# Patient Record
Sex: MALE | Race: WHITE | NOT HISPANIC OR LATINO | Employment: UNEMPLOYED | URBAN - METROPOLITAN AREA
[De-identification: names, ages, dates, MRNs, and addresses within clinical notes are randomized per-mention and may not be internally consistent; named-entity substitution may affect disease eponyms.]

---

## 2017-01-12 ENCOUNTER — OFFICE VISIT (OUTPATIENT)
Dept: SURGERY | Facility: CLINIC | Age: 57
End: 2017-01-12
Payer: COMMERCIAL

## 2017-01-12 VITALS
HEIGHT: 68 IN | DIASTOLIC BLOOD PRESSURE: 87 MMHG | HEART RATE: 74 BPM | BODY MASS INDEX: 32.58 KG/M2 | SYSTOLIC BLOOD PRESSURE: 133 MMHG | WEIGHT: 215 LBS | TEMPERATURE: 99 F

## 2017-01-12 DIAGNOSIS — Z09 POSTOP CHECK: Primary | ICD-10-CM

## 2017-01-12 PROCEDURE — 99024 POSTOP FOLLOW-UP VISIT: CPT | Mod: S$GLB,,, | Performed by: SURGERY

## 2017-01-12 PROCEDURE — 99999 PR PBB SHADOW E&M-EST. PATIENT-LVL III: CPT | Mod: PBBFAC,,, | Performed by: SURGERY

## 2017-01-12 NOTE — LETTER
Jono ashley - General Surgery  [address]  [facility] [address]  Phone: [phone] January 12, 2017      Sina Fenton MD  [address]  [facility] [address]    Patient: Paz Dunne   MR Number: [id number]   YOB: 1960   Date of Visit: 1/12/2017     Dear Dr. Fenton:    Thank you for referring Paz Dunne to me for evaluation. Below are the relevant portions of my assessment and plan of care.    Patient is status post post lap HH. He denies dysphagia, but has to eat some foods slowly. He denies heartburn, but gets full quick. When he burps he gets foam in his mouth. He denies any pain.    PLAN: Patient is happy with his procedure.  Bloating likely will improve with no specific treatment (he could go on Reglan, but could get side effects from that).  Regular duty and follow up PRN.    If you have questions, please do not hesitate to call me. I look forward to following Paz along with you.    Sincerely,      Cooper Keene MD   Section Head - General, Laparoscopic, Bariatric  Acute Care and Oncologic Surgery   - Surgical Weight Loss Program  [facility]    WSR/hao

## 2017-01-12 NOTE — PROGRESS NOTES
"Paz Dunne is a 56 y.o. male patient.   1. Postop check      Past Medical History   Diagnosis Date    Dysphagia     Elevated PSA     Hiatal hernia     Hypertension      no meds    Inflammatory bowel disease      diverticulitis    Prostate cancer     Restless leg      No past surgical history pertinent negatives on file.  Scheduled Meds:   triptorelin pamoate  22.5 mg Intramuscular 1 time in Clinic/HOD     Continuous Infusions:   PRN Meds:    Review of patient's allergies indicates:   Allergen Reactions    Tetanus vaccines and toxoid      There are no hospital problems to display for this patient.    Blood pressure 133/87, pulse 74, temperature 98.5 °F (36.9 °C), height 5' 8" (1.727 m), weight 97.5 kg (215 lb).    Subjective S/p lap hh.  He denies dysphagia but has to eat some foods slowly.  He denies heartburn but gets full quick.  When he burps he gets foam in his mouth. He denies any pain.  Objective Abdomen benign, wounds clear.   Assessment & Plan He is happy with his procedure.  Bloating likely will improve with no specific treatment (he could go on reglan but could get side effects from that).  Regular duty and follow up prn.       Cooper Keene MD  1/12/2017  "

## 2017-02-03 ENCOUNTER — HISTORICAL (OUTPATIENT)
Dept: INFUSION THERAPY | Facility: HOSPITAL | Age: 57
End: 2017-02-03

## 2017-02-17 ENCOUNTER — HISTORICAL (OUTPATIENT)
Dept: HEMATOLOGY/ONCOLOGY | Facility: CLINIC | Age: 57
End: 2017-02-17

## 2017-02-24 ENCOUNTER — HISTORICAL (OUTPATIENT)
Dept: RADIOLOGY | Facility: HOSPITAL | Age: 57
End: 2017-02-24

## 2017-03-24 ENCOUNTER — HISTORICAL (OUTPATIENT)
Dept: RADIOLOGY | Facility: HOSPITAL | Age: 57
End: 2017-03-24

## 2017-05-04 ENCOUNTER — HISTORICAL (OUTPATIENT)
Dept: INFUSION THERAPY | Facility: HOSPITAL | Age: 57
End: 2017-05-04

## 2017-05-12 ENCOUNTER — HISTORICAL (OUTPATIENT)
Dept: HEMATOLOGY/ONCOLOGY | Facility: CLINIC | Age: 57
End: 2017-05-12

## 2017-05-12 LAB
ABS NEUT (OLG): 3 X10(3)/MCL (ref 2.1–9.2)
ALBUMIN SERPL-MCNC: 3.5 GM/DL (ref 3.4–5)
ALBUMIN/GLOB SERPL: 1.1 RATIO (ref 1.1–2)
ALP SERPL-CCNC: 136 UNIT/L (ref 50–136)
ALT SERPL-CCNC: 17 UNIT/L (ref 12–78)
AST SERPL-CCNC: 15 UNIT/L (ref 15–37)
BASOPHILS # BLD AUTO: 0.1 X10(3)/MCL (ref 0–0.2)
BASOPHILS NFR BLD AUTO: 1.3 %
BILIRUB SERPL-MCNC: 0.6 MG/DL (ref 0.2–1)
BILIRUBIN DIRECT+TOT PNL SERPL-MCNC: 0.1 MG/DL (ref 0–0.5)
BILIRUBIN DIRECT+TOT PNL SERPL-MCNC: 0.5 MG/DL (ref 0–0.8)
BUN SERPL-MCNC: 17 MG/DL (ref 7–18)
CALCIUM SERPL-MCNC: 8.9 MG/DL (ref 8.5–10.1)
CHLORIDE SERPL-SCNC: 106 MMOL/L (ref 98–107)
CO2 SERPL-SCNC: 31 MMOL/L (ref 21–32)
CREAT SERPL-MCNC: 0.88 MG/DL (ref 0.7–1.3)
EOSINOPHIL # BLD AUTO: 0.2 X10(3)/MCL (ref 0–0.9)
EOSINOPHIL NFR BLD AUTO: 4.3 %
ERYTHROCYTE [DISTWIDTH] IN BLOOD BY AUTOMATED COUNT: 12.6 % (ref 11.5–17)
GLOBULIN SER-MCNC: 3.1 GM/DL (ref 2.4–3.5)
GLUCOSE SERPL-MCNC: 90 MG/DL (ref 74–106)
HCT VFR BLD AUTO: 41.7 % (ref 42–52)
HGB BLD-MCNC: 13.5 GM/DL (ref 14–18)
LYMPHOCYTES # BLD AUTO: 0.9 X10(3)/MCL (ref 0.6–4.6)
LYMPHOCYTES NFR BLD AUTO: 19.3 %
MCH RBC QN AUTO: 28.4 PG (ref 27–31)
MCHC RBC AUTO-ENTMCNC: 32.4 GM/DL (ref 33–36)
MCV RBC AUTO: 87.6 FL (ref 80–94)
MONOCYTES # BLD AUTO: 0.3 X10(3)/MCL (ref 0.1–1.3)
MONOCYTES NFR BLD AUTO: 7.6 %
NEUTROPHILS # BLD AUTO: 3 X10(3)/MCL (ref 2.1–9.2)
NEUTROPHILS NFR BLD AUTO: 67.5 %
PLATELET # BLD AUTO: 231 X10(3)/MCL (ref 130–400)
PMV BLD AUTO: 9 FL (ref 9.4–12.4)
POTASSIUM SERPL-SCNC: 4.7 MMOL/L (ref 3.5–5.1)
PROT SERPL-MCNC: 6.6 GM/DL (ref 6.4–8.2)
PSA SERPL-MCNC: 1.37 NG/ML (ref 0–4)
RBC # BLD AUTO: 4.76 X10(6)/MCL (ref 4.7–6.1)
SODIUM SERPL-SCNC: 143 MMOL/L (ref 136–145)
WBC # SPEC AUTO: 4.4 X10(3)/MCL (ref 4.5–11.5)

## 2017-07-21 ENCOUNTER — HISTORICAL (OUTPATIENT)
Dept: HEMATOLOGY/ONCOLOGY | Facility: CLINIC | Age: 57
End: 2017-07-21

## 2017-07-21 LAB
ABS NEUT (OLG): 2.13 X10(3)/MCL (ref 2.1–9.2)
ALBUMIN SERPL-MCNC: 3.5 GM/DL (ref 3.4–5)
ALBUMIN/GLOB SERPL: 1.1 {RATIO}
ALP SERPL-CCNC: 126 UNIT/L (ref 50–136)
ALT SERPL-CCNC: 24 UNIT/L (ref 12–78)
AST SERPL-CCNC: 15 UNIT/L (ref 15–37)
BASOPHILS # BLD AUTO: 0.1 X10(3)/MCL (ref 0–0.2)
BASOPHILS NFR BLD AUTO: 1.7 %
BILIRUB SERPL-MCNC: 0.7 MG/DL (ref 0.2–1)
BILIRUBIN DIRECT+TOT PNL SERPL-MCNC: 0.1 MG/DL (ref 0–0.2)
BILIRUBIN DIRECT+TOT PNL SERPL-MCNC: 0.6 MG/DL (ref 0–0.8)
BUN SERPL-MCNC: 19 MG/DL (ref 7–18)
CALCIUM SERPL-MCNC: 8.6 MG/DL (ref 8.5–10.1)
CHLORIDE SERPL-SCNC: 104 MMOL/L (ref 98–107)
CO2 SERPL-SCNC: 29 MMOL/L (ref 21–32)
CREAT SERPL-MCNC: 0.92 MG/DL (ref 0.7–1.3)
EOSINOPHIL # BLD AUTO: 0.2 X10(3)/MCL (ref 0–0.9)
EOSINOPHIL NFR BLD AUTO: 4.4 %
ERYTHROCYTE [DISTWIDTH] IN BLOOD BY AUTOMATED COUNT: 12.6 % (ref 11.5–17)
GLOBULIN SER-MCNC: 3.2 GM/DL (ref 2.4–3.5)
GLUCOSE SERPL-MCNC: 86 MG/DL (ref 74–106)
HCT VFR BLD AUTO: 41.6 % (ref 42–52)
HGB BLD-MCNC: 13.4 GM/DL (ref 14–18)
LYMPHOCYTES # BLD AUTO: 0.9 X10(3)/MCL (ref 0.6–4.6)
LYMPHOCYTES NFR BLD AUTO: 25.8 %
MCH RBC QN AUTO: 28.7 PG (ref 27–31)
MCHC RBC AUTO-ENTMCNC: 32.2 GM/DL (ref 33–36)
MCV RBC AUTO: 89.1 FL (ref 80–94)
MONOCYTES # BLD AUTO: 0.3 X10(3)/MCL (ref 0.1–1.3)
MONOCYTES NFR BLD AUTO: 8.9 %
NEUTROPHILS # BLD AUTO: 2.1 X10(3)/MCL (ref 2.1–9.2)
NEUTROPHILS NFR BLD AUTO: 59.2 %
PLATELET # BLD AUTO: 193 X10(3)/MCL (ref 130–400)
PMV BLD AUTO: 9.3 FL (ref 9.4–12.4)
POTASSIUM SERPL-SCNC: 4.6 MMOL/L (ref 3.5–5.1)
PROT SERPL-MCNC: 6.7 GM/DL (ref 6.4–8.2)
RBC # BLD AUTO: 4.67 X10(6)/MCL (ref 4.7–6.1)
SODIUM SERPL-SCNC: 140 MMOL/L (ref 136–145)
WBC # SPEC AUTO: 3.6 X10(3)/MCL (ref 4.5–11.5)

## 2017-07-26 ENCOUNTER — HISTORICAL (OUTPATIENT)
Dept: ADMINISTRATIVE | Facility: HOSPITAL | Age: 57
End: 2017-07-26

## 2017-07-26 LAB — PSA SERPL-MCNC: 0.49 NG/ML (ref 0–4)

## 2017-08-04 ENCOUNTER — HISTORICAL (OUTPATIENT)
Dept: INFUSION THERAPY | Facility: HOSPITAL | Age: 57
End: 2017-08-04

## 2017-08-11 ENCOUNTER — HISTORICAL (OUTPATIENT)
Dept: RADIOLOGY | Facility: HOSPITAL | Age: 57
End: 2017-08-11

## 2017-08-11 LAB
BUN SERPL-MCNC: 23 MG/DL (ref 7–18)
CREAT SERPL-MCNC: 1.13 MG/DL (ref 0.7–1.3)
POC CREATININE: 1 MG/DL (ref 0.6–1.3)

## 2017-09-21 ENCOUNTER — HISTORICAL (OUTPATIENT)
Dept: HEMATOLOGY/ONCOLOGY | Facility: CLINIC | Age: 57
End: 2017-09-21

## 2017-09-21 LAB
ABS NEUT (OLG): 2.53 X10(3)/MCL (ref 2.1–9.2)
ALBUMIN SERPL-MCNC: 3.6 GM/DL (ref 3.4–5)
ALBUMIN/GLOB SERPL: 1 RATIO (ref 1.1–2)
ALP SERPL-CCNC: 120 UNIT/L (ref 50–136)
ALT SERPL-CCNC: 19 UNIT/L (ref 12–78)
AST SERPL-CCNC: 31 UNIT/L (ref 15–37)
BASOPHILS # BLD AUTO: 0 X10(3)/MCL (ref 0–0.2)
BASOPHILS NFR BLD AUTO: 1.3 %
BILIRUB SERPL-MCNC: 0.5 MG/DL (ref 0.2–1)
BILIRUBIN DIRECT+TOT PNL SERPL-MCNC: 0.1
BILIRUBIN DIRECT+TOT PNL SERPL-MCNC: 0.4 MG/DL (ref 0–0.8)
BUN SERPL-MCNC: 21 MG/DL (ref 7–18)
CALCIUM SERPL-MCNC: 9 MG/DL (ref 8.5–10.1)
CHLORIDE SERPL-SCNC: 104 MMOL/L (ref 98–107)
CO2 SERPL-SCNC: 27 MMOL/L (ref 21–32)
CREAT SERPL-MCNC: 0.83 MG/DL (ref 0.7–1.3)
EOSINOPHIL # BLD AUTO: 0.2 X10(3)/MCL (ref 0–0.9)
EOSINOPHIL NFR BLD AUTO: 4.4 %
EOSINOPHIL NFR BLD MANUAL: 2 % (ref 0–8)
ERYTHROCYTE [DISTWIDTH] IN BLOOD BY AUTOMATED COUNT: 12.6 % (ref 11.5–17)
GLOBULIN SER-MCNC: 3.5 GM/DL (ref 2.4–3.5)
GLUCOSE SERPL-MCNC: 87 MG/DL (ref 74–106)
HCT VFR BLD AUTO: 43.3 % (ref 42–52)
HGB BLD-MCNC: 14 GM/DL (ref 14–18)
LYMPHOCYTES # BLD AUTO: 0.8 X10(3)/MCL (ref 0.6–4.6)
LYMPHOCYTES NFR BLD AUTO: 20.5 %
LYMPHOCYTES NFR BLD MANUAL: 22 % (ref 13–40)
MCH RBC QN AUTO: 28.9 PG (ref 27–31)
MCHC RBC AUTO-ENTMCNC: 32.3 GM/DL (ref 33–36)
MCV RBC AUTO: 89.5 FL (ref 80–94)
METAMYELOCYTES NFR BLD MANUAL: 3 %
MONOCYTES # BLD AUTO: 0.4 X10(3)/MCL (ref 0.1–1.3)
MONOCYTES NFR BLD AUTO: 9 %
MONOCYTES NFR BLD MANUAL: 4 % (ref 2–11)
MYELOCYTES NFR BLD MANUAL: 1 %
NEUTROPHILS # BLD AUTO: 2.5 X10(3)/MCL (ref 2.1–9.2)
NEUTROPHILS NFR BLD AUTO: 64.8 %
NEUTROPHILS NFR BLD MANUAL: 68 % (ref 47–80)
NRBC BLD MANUAL-RTO: 1 %
PLATELET # BLD AUTO: 126 X10(3)/MCL (ref 130–400)
PLATELET # BLD EST: ADEQUATE 10*3/UL
PMV BLD AUTO: 10.9 FL (ref 9.4–12.4)
POC CREATININE: 1 MG/DL (ref 0.6–1.3)
POTASSIUM SERPL-SCNC: 5.3 MMOL/L (ref 3.5–5.1)
PROT SERPL-MCNC: 7.1 GM/DL (ref 6.4–8.2)
PSA SERPL-MCNC: 0.6 NG/ML (ref 0–4)
RBC # BLD AUTO: 4.84 X10(6)/MCL (ref 4.7–6.1)
RBC MORPH BLD: NORMAL
SODIUM SERPL-SCNC: 141 MMOL/L (ref 136–145)
WBC # SPEC AUTO: 3.9 X10(3)/MCL (ref 4.5–11.5)

## 2017-11-03 ENCOUNTER — HISTORICAL (OUTPATIENT)
Dept: INFUSION THERAPY | Facility: HOSPITAL | Age: 57
End: 2017-11-03

## 2017-12-22 ENCOUNTER — HISTORICAL (OUTPATIENT)
Dept: HEMATOLOGY/ONCOLOGY | Facility: CLINIC | Age: 57
End: 2017-12-22

## 2017-12-22 LAB
ABS NEUT (OLG): 2.61 X10(3)/MCL (ref 2.1–9.2)
ALBUMIN SERPL-MCNC: 3.4 GM/DL (ref 3.4–5)
ALBUMIN/GLOB SERPL: 1 {RATIO}
ALP SERPL-CCNC: 132 UNIT/L (ref 50–136)
ALT SERPL-CCNC: 16 UNIT/L (ref 12–78)
AST SERPL-CCNC: 11 UNIT/L (ref 15–37)
BASOPHILS # BLD AUTO: 0 X10(3)/MCL (ref 0–0.2)
BASOPHILS NFR BLD AUTO: 1 %
BILIRUB SERPL-MCNC: 0.3 MG/DL (ref 0.2–1)
BILIRUBIN DIRECT+TOT PNL SERPL-MCNC: 0.1 MG/DL (ref 0–0.2)
BILIRUBIN DIRECT+TOT PNL SERPL-MCNC: 0.2 MG/DL (ref 0–0.8)
BUN SERPL-MCNC: 22 MG/DL (ref 7–18)
CALCIUM SERPL-MCNC: 8.5 MG/DL (ref 8.5–10.1)
CHLORIDE SERPL-SCNC: 105 MMOL/L (ref 98–107)
CO2 SERPL-SCNC: 29 MMOL/L (ref 21–32)
CREAT SERPL-MCNC: 0.99 MG/DL (ref 0.7–1.3)
EOSINOPHIL # BLD AUTO: 0.2 X10(3)/MCL (ref 0–0.9)
EOSINOPHIL NFR BLD AUTO: 4 %
ERYTHROCYTE [DISTWIDTH] IN BLOOD BY AUTOMATED COUNT: 12.2 % (ref 11.5–17)
GLOBULIN SER-MCNC: 3.4 GM/DL (ref 2.4–3.5)
GLUCOSE SERPL-MCNC: 97 MG/DL (ref 74–106)
HCT VFR BLD AUTO: 42 % (ref 42–52)
HGB BLD-MCNC: 13.5 GM/DL (ref 14–18)
LYMPHOCYTES # BLD AUTO: 0.8 X10(3)/MCL (ref 0.6–4.6)
LYMPHOCYTES NFR BLD AUTO: 19.7 %
MCH RBC QN AUTO: 29.2 PG (ref 27–31)
MCHC RBC AUTO-ENTMCNC: 32.1 GM/DL (ref 33–36)
MCV RBC AUTO: 90.9 FL (ref 80–94)
MONOCYTES # BLD AUTO: 0.4 X10(3)/MCL (ref 0.1–1.3)
MONOCYTES NFR BLD AUTO: 9.3 %
NEUTROPHILS # BLD AUTO: 2.6 X10(3)/MCL (ref 2.1–9.2)
NEUTROPHILS NFR BLD AUTO: 66 %
PLATELET # BLD AUTO: 197 X10(3)/MCL (ref 130–400)
PMV BLD AUTO: 8.7 FL (ref 9.4–12.4)
POTASSIUM SERPL-SCNC: 5.1 MMOL/L (ref 3.5–5.1)
PROT SERPL-MCNC: 6.8 GM/DL (ref 6.4–8.2)
PSA SERPL-MCNC: 0.33 NG/ML (ref 0–4)
RBC # BLD AUTO: 4.62 X10(6)/MCL (ref 4.7–6.1)
SODIUM SERPL-SCNC: 141 MMOL/L (ref 136–145)
WBC # SPEC AUTO: 4 X10(3)/MCL (ref 4.5–11.5)

## 2018-02-02 ENCOUNTER — HISTORICAL (OUTPATIENT)
Dept: INFUSION THERAPY | Facility: HOSPITAL | Age: 58
End: 2018-02-02

## 2018-03-23 ENCOUNTER — HISTORICAL (OUTPATIENT)
Dept: HEMATOLOGY/ONCOLOGY | Facility: CLINIC | Age: 58
End: 2018-03-23

## 2018-03-23 LAB
ABS NEUT (OLG): 3.85 X10(3)/MCL (ref 2.1–9.2)
ALBUMIN SERPL-MCNC: 3.6 GM/DL (ref 3.4–5)
ALBUMIN/GLOB SERPL: 1.2 RATIO (ref 1.1–2)
ALP SERPL-CCNC: 139 UNIT/L (ref 50–136)
ALT SERPL-CCNC: 18 UNIT/L (ref 12–78)
AST SERPL-CCNC: 12 UNIT/L (ref 15–37)
BASOPHILS # BLD AUTO: 0 X10(3)/MCL (ref 0–0.2)
BASOPHILS NFR BLD AUTO: 0.6 %
BILIRUB SERPL-MCNC: 0.4 MG/DL (ref 0.2–1)
BILIRUBIN DIRECT+TOT PNL SERPL-MCNC: 0.1 MG/DL (ref 0–0.5)
BILIRUBIN DIRECT+TOT PNL SERPL-MCNC: 0.3 MG/DL (ref 0–0.8)
BUN SERPL-MCNC: 18 MG/DL (ref 7–18)
CALCIUM SERPL-MCNC: 9.1 MG/DL (ref 8.5–10.1)
CHLORIDE SERPL-SCNC: 102 MMOL/L (ref 98–107)
CO2 SERPL-SCNC: 30 MMOL/L (ref 21–32)
CREAT SERPL-MCNC: 1.03 MG/DL (ref 0.7–1.3)
EOSINOPHIL # BLD AUTO: 0.2 X10(3)/MCL (ref 0–0.9)
EOSINOPHIL NFR BLD AUTO: 2.9 %
ERYTHROCYTE [DISTWIDTH] IN BLOOD BY AUTOMATED COUNT: 12.4 % (ref 11.5–17)
GLOBULIN SER-MCNC: 3.1 GM/DL (ref 2.4–3.5)
GLUCOSE SERPL-MCNC: 98 MG/DL (ref 74–106)
HCT VFR BLD AUTO: 44 % (ref 42–52)
HGB BLD-MCNC: 14.2 GM/DL (ref 14–18)
LYMPHOCYTES # BLD AUTO: 0.6 X10(3)/MCL (ref 0.6–4.6)
LYMPHOCYTES NFR BLD AUTO: 12.5 %
MCH RBC QN AUTO: 29.2 PG (ref 27–31)
MCHC RBC AUTO-ENTMCNC: 32.3 GM/DL (ref 33–36)
MCV RBC AUTO: 90.3 FL (ref 80–94)
MONOCYTES # BLD AUTO: 0.4 X10(3)/MCL (ref 0.1–1.3)
MONOCYTES NFR BLD AUTO: 8.8 %
NEUTROPHILS # BLD AUTO: 3.8 X10(3)/MCL (ref 2.1–9.2)
NEUTROPHILS NFR BLD AUTO: 75.2 %
PLATELET # BLD AUTO: 181 X10(3)/MCL (ref 130–400)
PMV BLD AUTO: 8.8 FL (ref 9.4–12.4)
POTASSIUM SERPL-SCNC: 4.3 MMOL/L (ref 3.5–5.1)
PROT SERPL-MCNC: 6.7 GM/DL (ref 6.4–8.2)
PSA SERPL-MCNC: 0.29 NG/ML (ref 0–4)
RBC # BLD AUTO: 4.87 X10(6)/MCL (ref 4.7–6.1)
SODIUM SERPL-SCNC: 139 MMOL/L (ref 136–145)
WBC # SPEC AUTO: 5.1 X10(3)/MCL (ref 4.5–11.5)

## 2018-05-03 ENCOUNTER — HISTORICAL (OUTPATIENT)
Dept: INFUSION THERAPY | Facility: HOSPITAL | Age: 58
End: 2018-05-03

## 2018-06-22 ENCOUNTER — HISTORICAL (OUTPATIENT)
Dept: HEMATOLOGY/ONCOLOGY | Facility: CLINIC | Age: 58
End: 2018-06-22

## 2018-06-22 LAB
ABS NEUT (OLG): 2.66 X10(3)/MCL (ref 2.1–9.2)
ALBUMIN SERPL-MCNC: 3.4 GM/DL (ref 3.4–5)
ALBUMIN/GLOB SERPL: 1 RATIO (ref 1.1–2)
ALP SERPL-CCNC: 134 UNIT/L (ref 50–136)
ALT SERPL-CCNC: 18 UNIT/L (ref 12–78)
AST SERPL-CCNC: 15 UNIT/L (ref 15–37)
BASOPHILS # BLD AUTO: 0 X10(3)/MCL (ref 0–0.2)
BASOPHILS NFR BLD AUTO: 1 %
BILIRUB SERPL-MCNC: 0.5 MG/DL (ref 0.2–1)
BILIRUBIN DIRECT+TOT PNL SERPL-MCNC: 0.1 MG/DL (ref 0–0.5)
BILIRUBIN DIRECT+TOT PNL SERPL-MCNC: 0.4 MG/DL (ref 0–0.8)
BUN SERPL-MCNC: 23 MG/DL (ref 7–18)
CALCIUM SERPL-MCNC: 8.8 MG/DL (ref 8.5–10.1)
CHLORIDE SERPL-SCNC: 105 MMOL/L (ref 98–107)
CO2 SERPL-SCNC: 30 MMOL/L (ref 21–32)
CREAT SERPL-MCNC: 1.06 MG/DL (ref 0.7–1.3)
EOSINOPHIL # BLD AUTO: 0.2 X10(3)/MCL (ref 0–0.9)
EOSINOPHIL NFR BLD AUTO: 4 %
ERYTHROCYTE [DISTWIDTH] IN BLOOD BY AUTOMATED COUNT: 12.3 % (ref 11.5–17)
GLOBULIN SER-MCNC: 3.3 GM/DL (ref 2.4–3.5)
GLUCOSE SERPL-MCNC: 86 MG/DL (ref 74–106)
HCT VFR BLD AUTO: 43 % (ref 42–52)
HGB BLD-MCNC: 13.9 GM/DL (ref 14–18)
LYMPHOCYTES # BLD AUTO: 1 X10(3)/MCL (ref 0.6–4.6)
LYMPHOCYTES NFR BLD AUTO: 22.9 %
MCH RBC QN AUTO: 29.3 PG (ref 27–31)
MCHC RBC AUTO-ENTMCNC: 32.3 GM/DL (ref 33–36)
MCV RBC AUTO: 90.7 FL (ref 80–94)
MONOCYTES # BLD AUTO: 0.4 X10(3)/MCL (ref 0.1–1.3)
MONOCYTES NFR BLD AUTO: 8.8 %
NEUTROPHILS # BLD AUTO: 2.7 X10(3)/MCL (ref 2.1–9.2)
NEUTROPHILS NFR BLD AUTO: 63.3 %
PLATELET # BLD AUTO: 202 X10(3)/MCL (ref 130–400)
PMV BLD AUTO: 9.1 FL (ref 9.4–12.4)
POTASSIUM SERPL-SCNC: 4.5 MMOL/L (ref 3.5–5.1)
PROT SERPL-MCNC: 6.7 GM/DL (ref 6.4–8.2)
PSA SERPL-MCNC: 0.22 NG/ML (ref 0–4)
RBC # BLD AUTO: 4.74 X10(6)/MCL (ref 4.7–6.1)
SODIUM SERPL-SCNC: 141 MMOL/L (ref 136–145)
WBC # SPEC AUTO: 4.2 X10(3)/MCL (ref 4.5–11.5)

## 2018-08-03 ENCOUNTER — HISTORICAL (OUTPATIENT)
Dept: INFUSION THERAPY | Facility: HOSPITAL | Age: 58
End: 2018-08-03

## 2018-09-21 ENCOUNTER — HISTORICAL (OUTPATIENT)
Dept: HEMATOLOGY/ONCOLOGY | Facility: CLINIC | Age: 58
End: 2018-09-21

## 2018-09-21 LAB
ABS NEUT (OLG): 2.78 X10(3)/MCL (ref 2.1–9.2)
ALBUMIN SERPL-MCNC: 3.4 GM/DL (ref 3.4–5)
ALBUMIN/GLOB SERPL: 1.1 {RATIO}
ALP SERPL-CCNC: 118 UNIT/L (ref 50–136)
ALT SERPL-CCNC: 15 UNIT/L (ref 12–78)
AST SERPL-CCNC: 13 UNIT/L (ref 15–37)
BASOPHILS # BLD AUTO: 0 X10(3)/MCL (ref 0–0.2)
BASOPHILS NFR BLD AUTO: 1 %
BILIRUB SERPL-MCNC: 0.2 MG/DL (ref 0.2–1)
BILIRUBIN DIRECT+TOT PNL SERPL-MCNC: 0.1 MG/DL (ref 0–0.2)
BILIRUBIN DIRECT+TOT PNL SERPL-MCNC: 0.1 MG/DL (ref 0–0.8)
BUN SERPL-MCNC: 24 MG/DL (ref 7–18)
CALCIUM SERPL-MCNC: 8.6 MG/DL (ref 8.5–10.1)
CHLORIDE SERPL-SCNC: 107 MMOL/L (ref 98–107)
CO2 SERPL-SCNC: 31 MMOL/L (ref 21–32)
CREAT SERPL-MCNC: 1 MG/DL (ref 0.7–1.3)
EOSINOPHIL # BLD AUTO: 0.2 X10(3)/MCL (ref 0–0.9)
EOSINOPHIL NFR BLD AUTO: 3.6 %
ERYTHROCYTE [DISTWIDTH] IN BLOOD BY AUTOMATED COUNT: 12.2 % (ref 11.5–17)
GLOBULIN SER-MCNC: 3.1 GM/DL (ref 2.4–3.5)
GLUCOSE SERPL-MCNC: 96 MG/DL (ref 74–106)
HCT VFR BLD AUTO: 41.6 % (ref 42–52)
HGB BLD-MCNC: 13.3 GM/DL (ref 14–18)
LYMPHOCYTES # BLD AUTO: 0.8 X10(3)/MCL (ref 0.6–4.6)
LYMPHOCYTES NFR BLD AUTO: 20.1 %
MCH RBC QN AUTO: 29.2 PG (ref 27–31)
MCHC RBC AUTO-ENTMCNC: 32 GM/DL (ref 33–36)
MCV RBC AUTO: 91.4 FL (ref 80–94)
MONOCYTES # BLD AUTO: 0.4 X10(3)/MCL (ref 0.1–1.3)
MONOCYTES NFR BLD AUTO: 8.6 %
NEUTROPHILS # BLD AUTO: 2.8 X10(3)/MCL (ref 2.1–9.2)
NEUTROPHILS NFR BLD AUTO: 66.7 %
PLATELET # BLD AUTO: 189 X10(3)/MCL (ref 130–400)
PMV BLD AUTO: 9.3 FL (ref 9.4–12.4)
POTASSIUM SERPL-SCNC: 5.1 MMOL/L (ref 3.5–5.1)
PROT SERPL-MCNC: 6.5 GM/DL (ref 6.4–8.2)
PSA SERPL-MCNC: 0.14 NG/ML (ref 0–4)
RBC # BLD AUTO: 4.55 X10(6)/MCL (ref 4.7–6.1)
SODIUM SERPL-SCNC: 142 MMOL/L (ref 136–145)
WBC # SPEC AUTO: 4.2 X10(3)/MCL (ref 4.5–11.5)

## 2018-11-02 ENCOUNTER — HISTORICAL (OUTPATIENT)
Dept: INFUSION THERAPY | Facility: HOSPITAL | Age: 58
End: 2018-11-02

## 2018-12-21 ENCOUNTER — HISTORICAL (OUTPATIENT)
Dept: HEMATOLOGY/ONCOLOGY | Facility: CLINIC | Age: 58
End: 2018-12-21

## 2018-12-21 LAB
ABS NEUT (OLG): 4.46 X10(3)/MCL (ref 2.1–9.2)
ALBUMIN SERPL-MCNC: 3.4 GM/DL (ref 3.4–5)
ALBUMIN/GLOB SERPL: 1 RATIO (ref 1.1–2)
ALP SERPL-CCNC: 129 UNIT/L (ref 50–136)
ALT SERPL-CCNC: 18 UNIT/L (ref 12–78)
AST SERPL-CCNC: 9 UNIT/L (ref 15–37)
BASOPHILS # BLD AUTO: 0 X10(3)/MCL (ref 0–0.2)
BASOPHILS NFR BLD AUTO: 0.7 %
BILIRUB SERPL-MCNC: 0.3 MG/DL (ref 0.2–1)
BILIRUBIN DIRECT+TOT PNL SERPL-MCNC: 0.1 MG/DL (ref 0–0.5)
BILIRUBIN DIRECT+TOT PNL SERPL-MCNC: 0.2 MG/DL (ref 0–0.8)
BUN SERPL-MCNC: 20 MG/DL (ref 7–18)
CALCIUM SERPL-MCNC: 8.7 MG/DL (ref 8.5–10.1)
CHLORIDE SERPL-SCNC: 102 MMOL/L (ref 98–107)
CO2 SERPL-SCNC: 29 MMOL/L (ref 21–32)
CREAT SERPL-MCNC: 1.1 MG/DL (ref 0.7–1.3)
EOSINOPHIL # BLD AUTO: 0.2 X10(3)/MCL (ref 0–0.9)
EOSINOPHIL NFR BLD AUTO: 2.9 %
ERYTHROCYTE [DISTWIDTH] IN BLOOD BY AUTOMATED COUNT: 11.7 % (ref 11.5–17)
GLOBULIN SER-MCNC: 3.4 GM/DL (ref 2.4–3.5)
GLUCOSE SERPL-MCNC: 84 MG/DL (ref 74–106)
HCT VFR BLD AUTO: 45.7 % (ref 42–52)
HGB BLD-MCNC: 14.6 GM/DL (ref 14–18)
LYMPHOCYTES # BLD AUTO: 1.5 X10(3)/MCL (ref 0.6–4.6)
LYMPHOCYTES NFR BLD AUTO: 21.4 %
MCH RBC QN AUTO: 28.3 PG (ref 27–31)
MCHC RBC AUTO-ENTMCNC: 31.9 GM/DL (ref 33–36)
MCV RBC AUTO: 88.7 FL (ref 80–94)
MONOCYTES # BLD AUTO: 0.7 X10(3)/MCL (ref 0.1–1.3)
MONOCYTES NFR BLD AUTO: 9.7 %
NEUTROPHILS # BLD AUTO: 4.5 X10(3)/MCL (ref 2.1–9.2)
NEUTROPHILS NFR BLD AUTO: 64.9 %
PLATELET # BLD AUTO: 260 X10(3)/MCL (ref 130–400)
PMV BLD AUTO: 8.5 FL (ref 9.4–12.4)
POTASSIUM SERPL-SCNC: 4.4 MMOL/L (ref 3.5–5.1)
PROT SERPL-MCNC: 6.8 GM/DL (ref 6.4–8.2)
PSA SERPL-MCNC: 0.07 NG/ML (ref 0–4)
RBC # BLD AUTO: 5.15 X10(6)/MCL (ref 4.7–6.1)
SODIUM SERPL-SCNC: 139 MMOL/L (ref 136–145)
WBC # SPEC AUTO: 6.9 X10(3)/MCL (ref 4.5–11.5)

## 2019-02-08 ENCOUNTER — HISTORICAL (OUTPATIENT)
Dept: INFUSION THERAPY | Facility: HOSPITAL | Age: 59
End: 2019-02-08

## 2019-03-22 ENCOUNTER — HISTORICAL (OUTPATIENT)
Dept: HEMATOLOGY/ONCOLOGY | Facility: CLINIC | Age: 59
End: 2019-03-22

## 2019-03-22 LAB
ABS NEUT (OLG): 2.37 X10(3)/MCL (ref 2.1–9.2)
ALBUMIN SERPL-MCNC: 3.3 GM/DL (ref 3.4–5)
ALBUMIN/GLOB SERPL: 1.1 RATIO (ref 1.1–2)
ALP SERPL-CCNC: 112 UNIT/L (ref 50–136)
ALT SERPL-CCNC: 19 UNIT/L (ref 12–78)
AST SERPL-CCNC: 17 UNIT/L (ref 15–37)
BASOPHILS # BLD AUTO: 0 X10(3)/MCL (ref 0–0.2)
BASOPHILS NFR BLD AUTO: 1 %
BILIRUB SERPL-MCNC: 0.4 MG/DL (ref 0.2–1)
BILIRUBIN DIRECT+TOT PNL SERPL-MCNC: 0.1 MG/DL (ref 0–0.5)
BILIRUBIN DIRECT+TOT PNL SERPL-MCNC: 0.3 MG/DL (ref 0–0.8)
BUN SERPL-MCNC: 19 MG/DL (ref 7–18)
CALCIUM SERPL-MCNC: 8.6 MG/DL (ref 8.5–10.1)
CHLORIDE SERPL-SCNC: 105 MMOL/L (ref 98–107)
CO2 SERPL-SCNC: 31 MMOL/L (ref 21–32)
CREAT SERPL-MCNC: 1.05 MG/DL (ref 0.7–1.3)
EOSINOPHIL # BLD AUTO: 0.2 X10(3)/MCL (ref 0–0.9)
EOSINOPHIL NFR BLD AUTO: 3.9 %
ERYTHROCYTE [DISTWIDTH] IN BLOOD BY AUTOMATED COUNT: 11.6 % (ref 11.5–17)
GLOBULIN SER-MCNC: 3 GM/DL (ref 2.4–3.5)
GLUCOSE SERPL-MCNC: 92 MG/DL (ref 74–106)
HCT VFR BLD AUTO: 41.5 % (ref 42–52)
HGB BLD-MCNC: 13.3 GM/DL (ref 14–18)
LYMPHOCYTES # BLD AUTO: 0.9 X10(3)/MCL (ref 0.6–4.6)
LYMPHOCYTES NFR BLD AUTO: 23.9 %
MCH RBC QN AUTO: 28.4 PG (ref 27–31)
MCHC RBC AUTO-ENTMCNC: 32 GM/DL (ref 33–36)
MCV RBC AUTO: 88.7 FL (ref 80–94)
MONOCYTES # BLD AUTO: 0.4 X10(3)/MCL (ref 0.1–1.3)
MONOCYTES NFR BLD AUTO: 10.3 %
NEUTROPHILS # BLD AUTO: 2.4 X10(3)/MCL (ref 2.1–9.2)
NEUTROPHILS NFR BLD AUTO: 60.9 %
PLATELET # BLD AUTO: 196 X10(3)/MCL (ref 130–400)
PMV BLD AUTO: 8.9 FL (ref 9.4–12.4)
POTASSIUM SERPL-SCNC: 4.4 MMOL/L (ref 3.5–5.1)
PROT SERPL-MCNC: 6.3 GM/DL (ref 6.4–8.2)
PSA SERPL-MCNC: 0.03 NG/ML (ref 0–4)
RBC # BLD AUTO: 4.68 X10(6)/MCL (ref 4.7–6.1)
SODIUM SERPL-SCNC: 140 MMOL/L (ref 136–145)
WBC # SPEC AUTO: 3.9 X10(3)/MCL (ref 4.5–11.5)

## 2019-04-02 ENCOUNTER — HISTORICAL (OUTPATIENT)
Dept: INFUSION THERAPY | Facility: HOSPITAL | Age: 59
End: 2019-04-02

## 2019-05-03 ENCOUNTER — HISTORICAL (OUTPATIENT)
Dept: INFUSION THERAPY | Facility: HOSPITAL | Age: 59
End: 2019-05-03

## 2019-06-21 ENCOUNTER — HISTORICAL (OUTPATIENT)
Dept: HEMATOLOGY/ONCOLOGY | Facility: CLINIC | Age: 59
End: 2019-06-21

## 2019-06-21 LAB
ABS NEUT (OLG): 2.87 X10(3)/MCL (ref 2.1–9.2)
ALBUMIN SERPL-MCNC: 3.7 GM/DL (ref 3.4–5)
ALBUMIN/GLOB SERPL: 1.2 {RATIO}
ALP SERPL-CCNC: 98 UNIT/L (ref 50–136)
ALT SERPL-CCNC: 18 UNIT/L (ref 12–78)
AST SERPL-CCNC: 13 UNIT/L (ref 15–37)
BASOPHILS # BLD AUTO: 0 X10(3)/MCL (ref 0–0.2)
BASOPHILS NFR BLD AUTO: 0.7 %
BILIRUB SERPL-MCNC: 0.5 MG/DL (ref 0.2–1)
BILIRUBIN DIRECT+TOT PNL SERPL-MCNC: 0.1 MG/DL (ref 0–0.2)
BILIRUBIN DIRECT+TOT PNL SERPL-MCNC: 0.4 MG/DL (ref 0–0.8)
BUN SERPL-MCNC: 21 MG/DL (ref 7–18)
CALCIUM SERPL-MCNC: 8.4 MG/DL (ref 8.5–10.1)
CHLORIDE SERPL-SCNC: 108 MMOL/L (ref 98–107)
CO2 SERPL-SCNC: 30 MMOL/L (ref 21–32)
CREAT SERPL-MCNC: 1.04 MG/DL (ref 0.7–1.3)
DEPRECATED CALCIDIOL+CALCIFEROL SERPL-MC: 21.14 NG/ML (ref 30–80)
EOSINOPHIL # BLD AUTO: 0.1 X10(3)/MCL (ref 0–0.9)
EOSINOPHIL NFR BLD AUTO: 3 %
ERYTHROCYTE [DISTWIDTH] IN BLOOD BY AUTOMATED COUNT: 12 % (ref 11.5–17)
GLOBULIN SER-MCNC: 3 GM/DL (ref 2.4–3.5)
GLUCOSE SERPL-MCNC: 99 MG/DL (ref 74–106)
HCT VFR BLD AUTO: 43.9 % (ref 42–52)
HGB BLD-MCNC: 13.6 GM/DL (ref 14–18)
LYMPHOCYTES # BLD AUTO: 0.9 X10(3)/MCL (ref 0.6–4.6)
LYMPHOCYTES NFR BLD AUTO: 21.5 %
MCH RBC QN AUTO: 28.5 PG (ref 27–31)
MCHC RBC AUTO-ENTMCNC: 31 GM/DL (ref 33–36)
MCV RBC AUTO: 92 FL (ref 80–94)
MONOCYTES # BLD AUTO: 0.3 X10(3)/MCL (ref 0.1–1.3)
MONOCYTES NFR BLD AUTO: 7.3 %
NEUTROPHILS # BLD AUTO: 2.9 X10(3)/MCL (ref 2.1–9.2)
NEUTROPHILS NFR BLD AUTO: 67.3 %
PLATELET # BLD AUTO: 179 X10(3)/MCL (ref 130–400)
PMV BLD AUTO: 9.1 FL (ref 9.4–12.4)
POTASSIUM SERPL-SCNC: 5.2 MMOL/L (ref 3.5–5.1)
PROT SERPL-MCNC: 6.7 GM/DL (ref 6.4–8.2)
PSA SERPL-MCNC: 0.02 NG/ML (ref 0–4)
RBC # BLD AUTO: 4.77 X10(6)/MCL (ref 4.7–6.1)
SODIUM SERPL-SCNC: 143 MMOL/L (ref 136–145)
WBC # SPEC AUTO: 4.3 X10(3)/MCL (ref 4.5–11.5)

## 2019-08-02 ENCOUNTER — HISTORICAL (OUTPATIENT)
Dept: INFUSION THERAPY | Facility: HOSPITAL | Age: 59
End: 2019-08-02

## 2019-09-23 ENCOUNTER — HISTORICAL (OUTPATIENT)
Dept: HEMATOLOGY/ONCOLOGY | Facility: CLINIC | Age: 59
End: 2019-09-23

## 2019-09-23 LAB
ABS NEUT (OLG): 3.1 X10(3)/MCL (ref 2.1–9.2)
ALBUMIN SERPL-MCNC: 3.4 GM/DL (ref 3.4–5)
ALBUMIN/GLOB SERPL: 1.3 {RATIO}
ALP SERPL-CCNC: 82 UNIT/L (ref 50–136)
ALT SERPL-CCNC: 14 UNIT/L (ref 12–78)
AST SERPL-CCNC: 11 UNIT/L (ref 15–37)
BASOPHILS # BLD AUTO: 0 X10(3)/MCL (ref 0–0.2)
BASOPHILS NFR BLD AUTO: 0.8 %
BILIRUB SERPL-MCNC: 0.3 MG/DL (ref 0.2–1)
BILIRUBIN DIRECT+TOT PNL SERPL-MCNC: 0.1 MG/DL (ref 0–0.2)
BILIRUBIN DIRECT+TOT PNL SERPL-MCNC: 0.2 MG/DL (ref 0–0.8)
BUN SERPL-MCNC: 20 MG/DL (ref 7–18)
CALCIUM SERPL-MCNC: 8.9 MG/DL (ref 8.5–10.1)
CHLORIDE SERPL-SCNC: 105 MMOL/L (ref 98–107)
CO2 SERPL-SCNC: 31 MMOL/L (ref 21–32)
CREAT SERPL-MCNC: 1.08 MG/DL (ref 0.7–1.3)
DEPRECATED CALCIDIOL+CALCIFEROL SERPL-MC: 22.45 NG/ML (ref 30–80)
EOSINOPHIL # BLD AUTO: 0.2 X10(3)/MCL (ref 0–0.9)
EOSINOPHIL NFR BLD AUTO: 3.3 %
ERYTHROCYTE [DISTWIDTH] IN BLOOD BY AUTOMATED COUNT: 11.8 % (ref 11.5–17)
GLOBULIN SER-MCNC: 2.7 GM/DL (ref 2.4–3.5)
GLUCOSE SERPL-MCNC: 98 MG/DL (ref 74–106)
HCT VFR BLD AUTO: 43 % (ref 42–52)
HGB BLD-MCNC: 13.5 GM/DL (ref 14–18)
LYMPHOCYTES # BLD AUTO: 1.1 X10(3)/MCL (ref 0.6–4.6)
LYMPHOCYTES NFR BLD AUTO: 23.5 %
MCH RBC QN AUTO: 28.7 PG (ref 27–31)
MCHC RBC AUTO-ENTMCNC: 31.4 GM/DL (ref 33–36)
MCV RBC AUTO: 91.3 FL (ref 80–94)
MONOCYTES # BLD AUTO: 0.4 X10(3)/MCL (ref 0.1–1.3)
MONOCYTES NFR BLD AUTO: 8.2 %
NEUTROPHILS # BLD AUTO: 3.1 X10(3)/MCL (ref 2.1–9.2)
NEUTROPHILS NFR BLD AUTO: 64 %
PLATELET # BLD AUTO: 177 X10(3)/MCL (ref 130–400)
PMV BLD AUTO: 8.8 FL (ref 9.4–12.4)
POTASSIUM SERPL-SCNC: 4.4 MMOL/L (ref 3.5–5.1)
PROT SERPL-MCNC: 6.1 GM/DL (ref 6.4–8.2)
PSA SERPL-MCNC: 0.02 NG/ML (ref 0–4)
RBC # BLD AUTO: 4.71 X10(6)/MCL (ref 4.7–6.1)
SODIUM SERPL-SCNC: 140 MMOL/L (ref 136–145)
WBC # SPEC AUTO: 4.8 X10(3)/MCL (ref 4.5–11.5)

## 2019-11-01 ENCOUNTER — HISTORICAL (OUTPATIENT)
Dept: INFUSION THERAPY | Facility: HOSPITAL | Age: 59
End: 2019-11-01

## 2019-12-23 ENCOUNTER — HISTORICAL (OUTPATIENT)
Dept: HEMATOLOGY/ONCOLOGY | Facility: CLINIC | Age: 59
End: 2019-12-23

## 2019-12-23 LAB
ABS NEUT (OLG): 3.16 X10(3)/MCL (ref 2.1–9.2)
ALBUMIN SERPL-MCNC: 3.6 GM/DL (ref 3.4–5)
ALBUMIN/GLOB SERPL: 1.2 {RATIO}
ALP SERPL-CCNC: 84 UNIT/L (ref 50–136)
ALT SERPL-CCNC: 19 UNIT/L (ref 12–78)
AST SERPL-CCNC: 10 UNIT/L (ref 15–37)
BASOPHILS # BLD AUTO: 0 X10(3)/MCL (ref 0–0.2)
BASOPHILS NFR BLD AUTO: 0.8 %
BILIRUB SERPL-MCNC: 0.6 MG/DL (ref 0.2–1)
BILIRUBIN DIRECT+TOT PNL SERPL-MCNC: 0.1 MG/DL (ref 0–0.2)
BILIRUBIN DIRECT+TOT PNL SERPL-MCNC: 0.5 MG/DL (ref 0–0.8)
BUN SERPL-MCNC: 19 MG/DL (ref 7–18)
CALCIUM SERPL-MCNC: 8.8 MG/DL (ref 8.5–10.1)
CHLORIDE SERPL-SCNC: 104 MMOL/L (ref 98–107)
CO2 SERPL-SCNC: 32 MMOL/L (ref 21–32)
CREAT SERPL-MCNC: 0.98 MG/DL (ref 0.7–1.3)
EOSINOPHIL # BLD AUTO: 0.2 X10(3)/MCL (ref 0–0.9)
EOSINOPHIL NFR BLD AUTO: 4 %
ERYTHROCYTE [DISTWIDTH] IN BLOOD BY AUTOMATED COUNT: 11.7 % (ref 11.5–17)
GLOBULIN SER-MCNC: 3.1 GM/DL (ref 2.4–3.5)
GLUCOSE SERPL-MCNC: 95 MG/DL (ref 74–106)
HCT VFR BLD AUTO: 44.8 % (ref 42–52)
HGB BLD-MCNC: 14.2 GM/DL (ref 14–18)
LYMPHOCYTES # BLD AUTO: 1.1 X10(3)/MCL (ref 0.6–4.6)
LYMPHOCYTES NFR BLD AUTO: 22.4 %
MCH RBC QN AUTO: 28.7 PG (ref 27–31)
MCHC RBC AUTO-ENTMCNC: 31.7 GM/DL (ref 33–36)
MCV RBC AUTO: 90.7 FL (ref 80–94)
MONOCYTES # BLD AUTO: 0.4 X10(3)/MCL (ref 0.1–1.3)
MONOCYTES NFR BLD AUTO: 8.7 %
NEUTROPHILS # BLD AUTO: 3.2 X10(3)/MCL (ref 2.1–9.2)
NEUTROPHILS NFR BLD AUTO: 63.7 %
PLATELET # BLD AUTO: 229 X10(3)/MCL (ref 130–400)
PMV BLD AUTO: 9.1 FL (ref 9.4–12.4)
POTASSIUM SERPL-SCNC: 4.7 MMOL/L (ref 3.5–5.1)
PROT SERPL-MCNC: 6.7 GM/DL (ref 6.4–8.2)
PSA SERPL-MCNC: 0.01 NG/ML (ref 0–4)
RBC # BLD AUTO: 4.94 X10(6)/MCL (ref 4.7–6.1)
SODIUM SERPL-SCNC: 139 MMOL/L (ref 136–145)
WBC # SPEC AUTO: 5 X10(3)/MCL (ref 4.5–11.5)

## 2020-02-05 ENCOUNTER — HISTORICAL (OUTPATIENT)
Dept: INFUSION THERAPY | Facility: HOSPITAL | Age: 60
End: 2020-02-05

## 2020-03-20 ENCOUNTER — HISTORICAL (OUTPATIENT)
Dept: HEMATOLOGY/ONCOLOGY | Facility: CLINIC | Age: 60
End: 2020-03-20

## 2020-03-20 LAB
ABS NEUT (OLG): 3.31 X10(3)/MCL (ref 2.1–9.2)
ALBUMIN SERPL-MCNC: 3.5 GM/DL (ref 3.4–5)
ALBUMIN/GLOB SERPL: 1.1 RATIO (ref 1.1–2)
ALP SERPL-CCNC: 84 UNIT/L (ref 50–136)
ALT SERPL-CCNC: 20 UNIT/L (ref 12–78)
AST SERPL-CCNC: 16 UNIT/L (ref 15–37)
BASOPHILS # BLD AUTO: 0 X10(3)/MCL (ref 0–0.2)
BASOPHILS NFR BLD AUTO: 0.8 %
BILIRUB SERPL-MCNC: 0.3 MG/DL (ref 0.2–1)
BILIRUBIN DIRECT+TOT PNL SERPL-MCNC: 0.1 MG/DL (ref 0–0.5)
BILIRUBIN DIRECT+TOT PNL SERPL-MCNC: 0.2 MG/DL (ref 0–0.8)
BUN SERPL-MCNC: 17 MG/DL (ref 7–18)
CALCIUM SERPL-MCNC: 8.4 MG/DL (ref 8.5–10.1)
CHLORIDE SERPL-SCNC: 108 MMOL/L (ref 98–107)
CO2 SERPL-SCNC: 29 MMOL/L (ref 21–32)
CREAT SERPL-MCNC: 0.95 MG/DL (ref 0.7–1.3)
DEPRECATED CALCIDIOL+CALCIFEROL SERPL-MC: 20.07 NG/ML (ref 30–80)
EOSINOPHIL # BLD AUTO: 0.2 X10(3)/MCL (ref 0–0.9)
EOSINOPHIL NFR BLD AUTO: 3.2 %
ERYTHROCYTE [DISTWIDTH] IN BLOOD BY AUTOMATED COUNT: 11.9 % (ref 11.5–17)
GLOBULIN SER-MCNC: 3.2 GM/DL (ref 2.4–3.5)
GLUCOSE SERPL-MCNC: 98 MG/DL (ref 74–106)
HCT VFR BLD AUTO: 44.8 % (ref 42–52)
HGB BLD-MCNC: 14.2 GM/DL (ref 14–18)
LYMPHOCYTES # BLD AUTO: 1.1 X10(3)/MCL (ref 0.6–4.6)
LYMPHOCYTES NFR BLD AUTO: 22.5 %
MCH RBC QN AUTO: 28.8 PG (ref 27–31)
MCHC RBC AUTO-ENTMCNC: 31.7 GM/DL (ref 33–36)
MCV RBC AUTO: 90.9 FL (ref 80–94)
MONOCYTES # BLD AUTO: 0.4 X10(3)/MCL (ref 0.1–1.3)
MONOCYTES NFR BLD AUTO: 7.6 %
NEUTROPHILS # BLD AUTO: 3.3 X10(3)/MCL (ref 2.1–9.2)
NEUTROPHILS NFR BLD AUTO: 65.7 %
PLATELET # BLD AUTO: 218 X10(3)/MCL (ref 130–400)
PMV BLD AUTO: 8.8 FL (ref 9.4–12.4)
POTASSIUM SERPL-SCNC: 4.6 MMOL/L (ref 3.5–5.1)
PROT SERPL-MCNC: 6.7 GM/DL (ref 6.4–8.2)
PSA SERPL-MCNC: 0.01 NG/ML (ref 0–4)
RBC # BLD AUTO: 4.93 X10(6)/MCL (ref 4.7–6.1)
SODIUM SERPL-SCNC: 141 MMOL/L (ref 136–145)
WBC # SPEC AUTO: 5 X10(3)/MCL (ref 4.5–11.5)

## 2020-05-07 ENCOUNTER — HISTORICAL (OUTPATIENT)
Dept: INFUSION THERAPY | Facility: HOSPITAL | Age: 60
End: 2020-05-07

## 2020-06-19 ENCOUNTER — HISTORICAL (OUTPATIENT)
Dept: HEMATOLOGY/ONCOLOGY | Facility: CLINIC | Age: 60
End: 2020-06-19

## 2020-06-19 LAB
ABS NEUT (OLG): 3.73 X10(3)/MCL (ref 2.1–9.2)
ALBUMIN SERPL-MCNC: 3.6 GM/DL (ref 3.5–5)
ALBUMIN/GLOB SERPL: 1.2 RATIO (ref 1.1–2)
ALP SERPL-CCNC: 85 UNIT/L (ref 40–150)
ALT SERPL-CCNC: 16 UNIT/L (ref 0–55)
AST SERPL-CCNC: 17 UNIT/L (ref 5–34)
BASOPHILS # BLD AUTO: 0 X10(3)/MCL (ref 0–0.2)
BASOPHILS NFR BLD AUTO: 0.5 %
BILIRUB SERPL-MCNC: 0.3 MG/DL
BILIRUBIN DIRECT+TOT PNL SERPL-MCNC: 0.1 MG/DL (ref 0–0.5)
BILIRUBIN DIRECT+TOT PNL SERPL-MCNC: 0.2 MG/DL (ref 0–0.8)
BUN SERPL-MCNC: 24.3 MG/DL (ref 8.4–25.7)
CALCIUM SERPL-MCNC: 8.4 MG/DL (ref 8.4–10.2)
CHLORIDE SERPL-SCNC: 103 MMOL/L (ref 98–107)
CO2 SERPL-SCNC: 27 MMOL/L (ref 22–29)
CREAT SERPL-MCNC: 1 MG/DL (ref 0.73–1.18)
EOSINOPHIL # BLD AUTO: 0.2 X10(3)/MCL (ref 0–0.9)
EOSINOPHIL NFR BLD AUTO: 3.1 %
ERYTHROCYTE [DISTWIDTH] IN BLOOD BY AUTOMATED COUNT: 11.8 % (ref 11.5–17)
GLOBULIN SER-MCNC: 2.9 GM/DL (ref 2.4–3.5)
GLUCOSE SERPL-MCNC: 98 MG/DL (ref 74–100)
HCT VFR BLD AUTO: 44.2 % (ref 42–52)
HGB BLD-MCNC: 14.1 GM/DL (ref 14–18)
LYMPHOCYTES # BLD AUTO: 1.4 X10(3)/MCL (ref 0.6–4.6)
LYMPHOCYTES NFR BLD AUTO: 24.2 %
MCH RBC QN AUTO: 28.6 PG (ref 27–31)
MCHC RBC AUTO-ENTMCNC: 31.9 GM/DL (ref 33–36)
MCV RBC AUTO: 89.7 FL (ref 80–94)
MONOCYTES # BLD AUTO: 0.4 X10(3)/MCL (ref 0.1–1.3)
MONOCYTES NFR BLD AUTO: 7.4 %
NEUTROPHILS # BLD AUTO: 3.7 X10(3)/MCL (ref 2.1–9.2)
NEUTROPHILS NFR BLD AUTO: 64.5 %
PLATELET # BLD AUTO: 213 X10(3)/MCL (ref 130–400)
PMV BLD AUTO: 8.8 FL (ref 9.4–12.4)
POTASSIUM SERPL-SCNC: 4.6 MMOL/L (ref 3.5–5.1)
PROT SERPL-MCNC: 6.5 GM/DL (ref 6.4–8.3)
PSA SERPL-MCNC: <0.02 NG/ML
RBC # BLD AUTO: 4.93 X10(6)/MCL (ref 4.7–6.1)
SODIUM SERPL-SCNC: 138 MMOL/L (ref 136–145)
WBC # SPEC AUTO: 5.8 X10(3)/MCL (ref 4.5–11.5)

## 2020-08-07 ENCOUNTER — HISTORICAL (OUTPATIENT)
Dept: INFUSION THERAPY | Facility: HOSPITAL | Age: 60
End: 2020-08-07

## 2020-09-18 ENCOUNTER — HISTORICAL (OUTPATIENT)
Dept: HEMATOLOGY/ONCOLOGY | Facility: CLINIC | Age: 60
End: 2020-09-18

## 2020-09-18 LAB
ABS NEUT (OLG): 2.98 X10(3)/MCL (ref 2.1–9.2)
ALBUMIN SERPL-MCNC: 3.6 GM/DL (ref 3.5–5)
ALBUMIN/GLOB SERPL: 1.4 RATIO (ref 1.1–2)
ALP SERPL-CCNC: 74 UNIT/L (ref 40–150)
ALT SERPL-CCNC: 15 UNIT/L (ref 0–55)
AST SERPL-CCNC: 19 UNIT/L (ref 5–34)
BASOPHILS # BLD AUTO: 0 X10(3)/MCL (ref 0–0.2)
BASOPHILS NFR BLD AUTO: 0.8 %
BILIRUB SERPL-MCNC: 0.4 MG/DL
BILIRUBIN DIRECT+TOT PNL SERPL-MCNC: 0.2 MG/DL (ref 0–0.5)
BILIRUBIN DIRECT+TOT PNL SERPL-MCNC: 0.2 MG/DL (ref 0–0.8)
BUN SERPL-MCNC: 19.2 MG/DL (ref 8.4–25.7)
CALCIUM SERPL-MCNC: 8.2 MG/DL (ref 8.4–10.2)
CHLORIDE SERPL-SCNC: 104 MMOL/L (ref 98–107)
CO2 SERPL-SCNC: 29 MMOL/L (ref 22–29)
CREAT SERPL-MCNC: 0.88 MG/DL (ref 0.73–1.18)
EOSINOPHIL # BLD AUTO: 0.2 X10(3)/MCL (ref 0–0.9)
EOSINOPHIL NFR BLD AUTO: 3.1 %
ERYTHROCYTE [DISTWIDTH] IN BLOOD BY AUTOMATED COUNT: 12 % (ref 11.5–17)
GLOBULIN SER-MCNC: 2.5 GM/DL (ref 2.4–3.5)
GLUCOSE SERPL-MCNC: 101 MG/DL (ref 74–100)
HCT VFR BLD AUTO: 42 % (ref 42–52)
HGB BLD-MCNC: 13.6 GM/DL (ref 14–18)
LYMPHOCYTES # BLD AUTO: 1.2 X10(3)/MCL (ref 0.6–4.6)
LYMPHOCYTES NFR BLD AUTO: 25 %
MCH RBC QN AUTO: 28.8 PG (ref 27–31)
MCHC RBC AUTO-ENTMCNC: 32.4 GM/DL (ref 33–36)
MCV RBC AUTO: 89 FL (ref 80–94)
MONOCYTES # BLD AUTO: 0.4 X10(3)/MCL (ref 0.1–1.3)
MONOCYTES NFR BLD AUTO: 9.3 %
NEUTROPHILS # BLD AUTO: 3 X10(3)/MCL (ref 2.1–9.2)
NEUTROPHILS NFR BLD AUTO: 61.6 %
PLATELET # BLD AUTO: 209 X10(3)/MCL (ref 130–400)
PMV BLD AUTO: 9.1 FL (ref 9.4–12.4)
POTASSIUM SERPL-SCNC: 4.7 MMOL/L (ref 3.5–5.1)
PROT SERPL-MCNC: 6.1 GM/DL (ref 6.4–8.3)
PSA SERPL-MCNC: 0.02 NG/ML
RBC # BLD AUTO: 4.72 X10(6)/MCL (ref 4.7–6.1)
SODIUM SERPL-SCNC: 138 MMOL/L (ref 136–145)
WBC # SPEC AUTO: 4.8 X10(3)/MCL (ref 4.5–11.5)

## 2020-11-09 ENCOUNTER — HISTORICAL (OUTPATIENT)
Dept: INFUSION THERAPY | Facility: HOSPITAL | Age: 60
End: 2020-11-09

## 2020-12-10 ENCOUNTER — HISTORICAL (OUTPATIENT)
Dept: HEMATOLOGY/ONCOLOGY | Facility: CLINIC | Age: 60
End: 2020-12-10

## 2020-12-10 LAB
ABS NEUT (OLG): 2.79 X10(3)/MCL (ref 2.1–9.2)
ALBUMIN SERPL-MCNC: 3.7 GM/DL (ref 3.5–5)
ALBUMIN/GLOB SERPL: 1.4 RATIO (ref 1.1–2)
ALP SERPL-CCNC: 79 UNIT/L (ref 40–150)
ALT SERPL-CCNC: 14 UNIT/L (ref 0–55)
AST SERPL-CCNC: 16 UNIT/L (ref 5–34)
BASOPHILS # BLD AUTO: 0 X10(3)/MCL (ref 0–0.2)
BASOPHILS NFR BLD AUTO: 1.1 %
BILIRUB SERPL-MCNC: 0.5 MG/DL
BILIRUBIN DIRECT+TOT PNL SERPL-MCNC: 0.2 MG/DL (ref 0–0.5)
BILIRUBIN DIRECT+TOT PNL SERPL-MCNC: 0.3 MG/DL (ref 0–0.8)
BUN SERPL-MCNC: 19.1 MG/DL (ref 8.4–25.7)
CALCIUM SERPL-MCNC: 8.6 MG/DL (ref 8.4–10.2)
CHLORIDE SERPL-SCNC: 102 MMOL/L (ref 98–107)
CO2 SERPL-SCNC: 30 MMOL/L (ref 22–29)
CREAT SERPL-MCNC: 1.02 MG/DL (ref 0.73–1.18)
EOSINOPHIL # BLD AUTO: 0.2 X10(3)/MCL (ref 0–0.9)
EOSINOPHIL NFR BLD AUTO: 3.4 %
ERYTHROCYTE [DISTWIDTH] IN BLOOD BY AUTOMATED COUNT: 11.9 % (ref 11.5–17)
GLOBULIN SER-MCNC: 2.7 GM/DL (ref 2.4–3.5)
GLUCOSE SERPL-MCNC: 91 MG/DL (ref 74–100)
HCT VFR BLD AUTO: 42.8 % (ref 42–52)
HGB BLD-MCNC: 13.6 GM/DL (ref 14–18)
LYMPHOCYTES # BLD AUTO: 1.1 X10(3)/MCL (ref 0.6–4.6)
LYMPHOCYTES NFR BLD AUTO: 23.9 %
MCH RBC QN AUTO: 28.9 PG (ref 27–31)
MCHC RBC AUTO-ENTMCNC: 31.8 GM/DL (ref 33–36)
MCV RBC AUTO: 90.9 FL (ref 80–94)
MONOCYTES # BLD AUTO: 0.4 X10(3)/MCL (ref 0.1–1.3)
MONOCYTES NFR BLD AUTO: 8.6 %
NEUTROPHILS # BLD AUTO: 2.8 X10(3)/MCL (ref 2.1–9.2)
NEUTROPHILS NFR BLD AUTO: 62.8 %
PLATELET # BLD AUTO: 205 X10(3)/MCL (ref 130–400)
PMV BLD AUTO: 9 FL (ref 9.4–12.4)
POTASSIUM SERPL-SCNC: 4.6 MMOL/L (ref 3.5–5.1)
PROT SERPL-MCNC: 6.4 GM/DL (ref 6.4–8.3)
PSA SERPL-MCNC: 0.03 NG/ML
RBC # BLD AUTO: 4.71 X10(6)/MCL (ref 4.7–6.1)
SODIUM SERPL-SCNC: 138 MMOL/L (ref 136–145)
WBC # SPEC AUTO: 4.4 X10(3)/MCL (ref 4.5–11.5)

## 2020-12-17 ENCOUNTER — HISTORICAL (OUTPATIENT)
Dept: HEPATOLOGY | Facility: HOSPITAL | Age: 60
End: 2020-12-17

## 2021-02-05 ENCOUNTER — HISTORICAL (OUTPATIENT)
Dept: INFUSION THERAPY | Facility: HOSPITAL | Age: 61
End: 2021-02-05

## 2021-03-05 ENCOUNTER — HISTORICAL (OUTPATIENT)
Dept: HEMATOLOGY/ONCOLOGY | Facility: CLINIC | Age: 61
End: 2021-03-05

## 2021-03-05 LAB
ABS NEUT (OLG): 3.06 X10(3)/MCL (ref 2.1–9.2)
ALBUMIN SERPL-MCNC: 3.8 GM/DL (ref 3.5–5)
ALBUMIN/GLOB SERPL: 1.5 RATIO (ref 1.1–2)
ALP SERPL-CCNC: 68 UNIT/L (ref 40–150)
ALT SERPL-CCNC: 15 UNIT/L (ref 0–55)
AST SERPL-CCNC: 17 UNIT/L (ref 5–34)
BASOPHILS # BLD AUTO: 0 X10(3)/MCL (ref 0–0.2)
BASOPHILS NFR BLD AUTO: 0.9 %
BILIRUB SERPL-MCNC: 0.3 MG/DL
BILIRUBIN DIRECT+TOT PNL SERPL-MCNC: 0.1 MG/DL (ref 0–0.5)
BILIRUBIN DIRECT+TOT PNL SERPL-MCNC: 0.2 MG/DL (ref 0–0.8)
BUN SERPL-MCNC: 20.2 MG/DL (ref 8.4–25.7)
CALCIUM SERPL-MCNC: 8.6 MG/DL (ref 8.4–10.2)
CHLORIDE SERPL-SCNC: 107 MMOL/L (ref 98–107)
CO2 SERPL-SCNC: 31 MMOL/L (ref 22–29)
CREAT SERPL-MCNC: 1.1 MG/DL (ref 0.73–1.18)
DEPRECATED CALCIDIOL+CALCIFEROL SERPL-MC: 22.7 NG/ML (ref 30–80)
EOSINOPHIL # BLD AUTO: 0.1 X10(3)/MCL (ref 0–0.9)
EOSINOPHIL NFR BLD AUTO: 3 %
ERYTHROCYTE [DISTWIDTH] IN BLOOD BY AUTOMATED COUNT: 11.8 % (ref 11.5–17)
GLOBULIN SER-MCNC: 2.6 GM/DL (ref 2.4–3.5)
GLUCOSE SERPL-MCNC: 71 MG/DL (ref 74–100)
HCT VFR BLD AUTO: 41.7 % (ref 42–52)
HGB BLD-MCNC: 13.4 GM/DL (ref 14–18)
LYMPHOCYTES # BLD AUTO: 1 X10(3)/MCL (ref 0.6–4.6)
LYMPHOCYTES NFR BLD AUTO: 22.4 %
MCH RBC QN AUTO: 29.1 PG (ref 27–31)
MCHC RBC AUTO-ENTMCNC: 32.1 GM/DL (ref 33–36)
MCV RBC AUTO: 90.7 FL (ref 80–94)
MONOCYTES # BLD AUTO: 0.4 X10(3)/MCL (ref 0.1–1.3)
MONOCYTES NFR BLD AUTO: 8.3 %
NEUTROPHILS # BLD AUTO: 3.1 X10(3)/MCL (ref 2.1–9.2)
NEUTROPHILS NFR BLD AUTO: 65.4 %
PLATELET # BLD AUTO: 210 X10(3)/MCL (ref 130–400)
PMV BLD AUTO: 9 FL (ref 9.4–12.4)
POTASSIUM SERPL-SCNC: 4.5 MMOL/L (ref 3.5–5.1)
PROT SERPL-MCNC: 6.4 GM/DL (ref 6.4–8.3)
PSA SERPL-MCNC: 0.03 NG/ML
RBC # BLD AUTO: 4.6 X10(6)/MCL (ref 4.7–6.1)
SODIUM SERPL-SCNC: 143 MMOL/L (ref 136–145)
WBC # SPEC AUTO: 4.7 X10(3)/MCL (ref 4.5–11.5)

## 2021-03-23 ENCOUNTER — HISTORICAL (OUTPATIENT)
Dept: RADIOLOGY | Facility: HOSPITAL | Age: 61
End: 2021-03-23

## 2021-04-05 ENCOUNTER — HISTORICAL (OUTPATIENT)
Dept: HEMATOLOGY/ONCOLOGY | Facility: CLINIC | Age: 61
End: 2021-04-05

## 2021-04-05 ENCOUNTER — HISTORICAL (OUTPATIENT)
Dept: RADIOLOGY | Facility: HOSPITAL | Age: 61
End: 2021-04-05

## 2021-04-05 LAB
ABS NEUT (OLG): 2.85 X10(3)/MCL (ref 2.1–9.2)
ALBUMIN SERPL-MCNC: 3.6 GM/DL (ref 3.5–5)
ALBUMIN/GLOB SERPL: 1.4 RATIO (ref 1.1–2)
ALP SERPL-CCNC: 79 UNIT/L (ref 40–150)
ALT SERPL-CCNC: 13 UNIT/L (ref 0–55)
AST SERPL-CCNC: 16 UNIT/L (ref 5–34)
BASOPHILS # BLD AUTO: 0 X10(3)/MCL (ref 0–0.2)
BASOPHILS NFR BLD AUTO: 0.9 %
BILIRUB SERPL-MCNC: 0.3 MG/DL
BILIRUBIN DIRECT+TOT PNL SERPL-MCNC: 0.1 MG/DL (ref 0–0.5)
BILIRUBIN DIRECT+TOT PNL SERPL-MCNC: 0.2 MG/DL (ref 0–0.8)
BUN SERPL-MCNC: 14.8 MG/DL (ref 8.4–25.7)
CALCIUM SERPL-MCNC: 8.6 MG/DL (ref 8.4–10.2)
CHLORIDE SERPL-SCNC: 106 MMOL/L (ref 98–107)
CO2 SERPL-SCNC: 27 MMOL/L (ref 22–29)
CREAT SERPL-MCNC: 1 MG/DL (ref 0.73–1.18)
EOSINOPHIL # BLD AUTO: 0.1 X10(3)/MCL (ref 0–0.9)
EOSINOPHIL NFR BLD AUTO: 2.9 %
ERYTHROCYTE [DISTWIDTH] IN BLOOD BY AUTOMATED COUNT: 11.8 % (ref 11.5–17)
GLOBULIN SER-MCNC: 2.5 GM/DL (ref 2.4–3.5)
GLUCOSE SERPL-MCNC: 121 MG/DL (ref 74–100)
HCT VFR BLD AUTO: 40.7 % (ref 42–52)
HGB BLD-MCNC: 13.3 GM/DL (ref 14–18)
LYMPHOCYTES # BLD AUTO: 1.1 X10(3)/MCL (ref 0.6–4.6)
LYMPHOCYTES NFR BLD AUTO: 23.9 %
MCH RBC QN AUTO: 29.2 PG (ref 27–31)
MCHC RBC AUTO-ENTMCNC: 32.7 GM/DL (ref 33–36)
MCV RBC AUTO: 89.3 FL (ref 80–94)
MONOCYTES # BLD AUTO: 0.4 X10(3)/MCL (ref 0.1–1.3)
MONOCYTES NFR BLD AUTO: 8.3 %
NEUTROPHILS # BLD AUTO: 2.8 X10(3)/MCL (ref 2.1–9.2)
NEUTROPHILS NFR BLD AUTO: 63.8 %
PLATELET # BLD AUTO: 211 X10(3)/MCL (ref 130–400)
PMV BLD AUTO: 9.3 FL (ref 9.4–12.4)
POTASSIUM SERPL-SCNC: 4.3 MMOL/L (ref 3.5–5.1)
PROT SERPL-MCNC: 6.1 GM/DL (ref 6.4–8.3)
PSA SERPL-MCNC: 0.04 NG/ML
RBC # BLD AUTO: 4.56 X10(6)/MCL (ref 4.7–6.1)
SODIUM SERPL-SCNC: 140 MMOL/L (ref 136–145)
WBC # SPEC AUTO: 4.5 X10(3)/MCL (ref 4.5–11.5)

## 2021-04-06 ENCOUNTER — HISTORICAL (OUTPATIENT)
Dept: RADIATION THERAPY | Facility: HOSPITAL | Age: 61
End: 2021-04-06

## 2021-05-13 ENCOUNTER — HISTORICAL (OUTPATIENT)
Dept: INFUSION THERAPY | Facility: HOSPITAL | Age: 61
End: 2021-05-13

## 2021-05-17 ENCOUNTER — HISTORICAL (OUTPATIENT)
Dept: INFUSION THERAPY | Facility: HOSPITAL | Age: 61
End: 2021-05-17

## 2021-07-06 ENCOUNTER — HISTORICAL (OUTPATIENT)
Dept: HEMATOLOGY/ONCOLOGY | Facility: CLINIC | Age: 61
End: 2021-07-06

## 2021-07-06 LAB
ABS NEUT (OLG): 3.13 X10(3)/MCL (ref 2.1–9.2)
ALBUMIN SERPL-MCNC: 3.2 GM/DL (ref 3.5–5)
ALBUMIN/GLOB SERPL: 1 RATIO (ref 1.1–2)
ALP SERPL-CCNC: 88 UNIT/L (ref 40–150)
ALT SERPL-CCNC: 20 UNIT/L (ref 0–55)
AST SERPL-CCNC: 18 UNIT/L (ref 5–34)
BASOPHILS # BLD AUTO: 0 X10(3)/MCL (ref 0–0.2)
BASOPHILS NFR BLD AUTO: 0.8 %
BILIRUB SERPL-MCNC: 0.3 MG/DL
BILIRUBIN DIRECT+TOT PNL SERPL-MCNC: 0.1 MG/DL (ref 0–0.5)
BILIRUBIN DIRECT+TOT PNL SERPL-MCNC: 0.2 MG/DL (ref 0–0.8)
BUN SERPL-MCNC: 17.9 MG/DL (ref 8.4–25.7)
CALCIUM SERPL-MCNC: 8.4 MG/DL (ref 8.4–10.2)
CHLORIDE SERPL-SCNC: 105 MMOL/L (ref 98–107)
CO2 SERPL-SCNC: 26 MMOL/L (ref 22–29)
CREAT SERPL-MCNC: 0.86 MG/DL (ref 0.73–1.18)
EOSINOPHIL # BLD AUTO: 0.2 X10(3)/MCL (ref 0–0.9)
EOSINOPHIL NFR BLD AUTO: 4.1 %
ERYTHROCYTE [DISTWIDTH] IN BLOOD BY AUTOMATED COUNT: 12 % (ref 11.5–17)
GLOBULIN SER-MCNC: 3.1 GM/DL (ref 2.4–3.5)
GLUCOSE SERPL-MCNC: 96 MG/DL (ref 74–100)
HCT VFR BLD AUTO: 40.9 % (ref 42–52)
HGB BLD-MCNC: 13 GM/DL (ref 14–18)
LYMPHOCYTES # BLD AUTO: 1.1 X10(3)/MCL (ref 0.6–4.6)
LYMPHOCYTES NFR BLD AUTO: 22 %
MCH RBC QN AUTO: 28.3 PG (ref 27–31)
MCHC RBC AUTO-ENTMCNC: 31.8 GM/DL (ref 33–36)
MCV RBC AUTO: 89.1 FL (ref 80–94)
MONOCYTES # BLD AUTO: 0.4 X10(3)/MCL (ref 0.1–1.3)
MONOCYTES NFR BLD AUTO: 8.6 %
NEUTROPHILS # BLD AUTO: 3.1 X10(3)/MCL (ref 2.1–9.2)
NEUTROPHILS NFR BLD AUTO: 64.3 %
PLATELET # BLD AUTO: 169 X10(3)/MCL (ref 130–400)
PMV BLD AUTO: 8.7 FL (ref 9.4–12.4)
POTASSIUM SERPL-SCNC: 4.3 MMOL/L (ref 3.5–5.1)
PROT SERPL-MCNC: 6.3 GM/DL (ref 6.4–8.3)
PSA SERPL-MCNC: <0.1 NG/ML
RBC # BLD AUTO: 4.59 X10(6)/MCL (ref 4.7–6.1)
SODIUM SERPL-SCNC: 141 MMOL/L (ref 136–145)
WBC # SPEC AUTO: 4.9 X10(3)/MCL (ref 4.5–11.5)

## 2021-08-12 ENCOUNTER — HISTORICAL (OUTPATIENT)
Dept: INFUSION THERAPY | Facility: HOSPITAL | Age: 61
End: 2021-08-12

## 2021-10-04 ENCOUNTER — HISTORICAL (OUTPATIENT)
Dept: HEMATOLOGY/ONCOLOGY | Facility: CLINIC | Age: 61
End: 2021-10-04

## 2021-10-04 LAB
ABS NEUT (OLG): 2.64 X10(3)/MCL (ref 2.1–9.2)
ALBUMIN SERPL-MCNC: 3.5 GM/DL (ref 3.5–5)
ALBUMIN/GLOB SERPL: 1.2 RATIO (ref 1.1–2)
ALP SERPL-CCNC: 83 UNIT/L (ref 40–150)
ALT SERPL-CCNC: 16 UNIT/L (ref 0–55)
AST SERPL-CCNC: 15 UNIT/L (ref 5–34)
BASOPHILS # BLD AUTO: 0 X10(3)/MCL (ref 0–0.2)
BASOPHILS NFR BLD AUTO: 0.9 %
BILIRUB SERPL-MCNC: <0.5 MG/DL
BILIRUBIN DIRECT+TOT PNL SERPL-MCNC: 0.1 MG/DL (ref 0–0.5)
BILIRUBIN DIRECT+TOT PNL SERPL-MCNC: <0.4 MG/DL (ref 0–0.8)
BUN SERPL-MCNC: 15.4 MG/DL (ref 8.4–25.7)
CALCIUM SERPL-MCNC: 9.3 MG/DL (ref 8.4–10.2)
CHLORIDE SERPL-SCNC: 104 MMOL/L (ref 98–107)
CO2 SERPL-SCNC: 29 MMOL/L (ref 22–29)
CREAT SERPL-MCNC: 0.91 MG/DL (ref 0.73–1.18)
EOSINOPHIL # BLD AUTO: 0.2 X10(3)/MCL (ref 0–0.9)
EOSINOPHIL NFR BLD AUTO: 4.3 %
ERYTHROCYTE [DISTWIDTH] IN BLOOD BY AUTOMATED COUNT: 12.1 % (ref 11.5–17)
GLOBULIN SER-MCNC: 2.9 GM/DL (ref 2.4–3.5)
GLUCOSE SERPL-MCNC: 92 MG/DL (ref 74–100)
HCT VFR BLD AUTO: 44.2 % (ref 42–52)
HGB BLD-MCNC: 13.8 GM/DL (ref 14–18)
LYMPHOCYTES # BLD AUTO: 1.1 X10(3)/MCL (ref 0.6–4.6)
LYMPHOCYTES NFR BLD AUTO: 25.7 %
MCH RBC QN AUTO: 29 PG (ref 27–31)
MCHC RBC AUTO-ENTMCNC: 31.2 GM/DL (ref 33–36)
MCV RBC AUTO: 92.9 FL (ref 80–94)
MONOCYTES # BLD AUTO: 0.4 X10(3)/MCL (ref 0.1–1.3)
MONOCYTES NFR BLD AUTO: 8.9 %
NEUTROPHILS # BLD AUTO: 2.6 X10(3)/MCL (ref 2.1–9.2)
NEUTROPHILS NFR BLD AUTO: 60 %
PLATELET # BLD AUTO: 185 X10(3)/MCL (ref 130–400)
PMV BLD AUTO: 8.5 FL (ref 9.4–12.4)
POTASSIUM SERPL-SCNC: 5.2 MMOL/L (ref 3.5–5.1)
PROT SERPL-MCNC: 6.4 GM/DL (ref 6.4–8.3)
PSA SERPL-MCNC: <0.1 NG/ML
RBC # BLD AUTO: 4.76 X10(6)/MCL (ref 4.7–6.1)
SODIUM SERPL-SCNC: 143 MMOL/L (ref 136–145)
WBC # SPEC AUTO: 4.4 X10(3)/MCL (ref 4.5–11.5)

## 2021-11-12 ENCOUNTER — HISTORICAL (OUTPATIENT)
Dept: INFUSION THERAPY | Facility: HOSPITAL | Age: 61
End: 2021-11-12

## 2022-01-05 ENCOUNTER — HISTORICAL (OUTPATIENT)
Dept: HEMATOLOGY/ONCOLOGY | Facility: CLINIC | Age: 62
End: 2022-01-05

## 2022-01-05 LAB
ABS NEUT (OLG): 3.13 X10(3)/MCL (ref 2.1–9.2)
ALBUMIN SERPL-MCNC: 3.6 GM/DL (ref 3.5–5)
ALBUMIN/GLOB SERPL: 1.2 RATIO (ref 1.1–2)
ALP SERPL-CCNC: 87 UNIT/L (ref 40–150)
ALT SERPL-CCNC: 13 UNIT/L (ref 0–55)
AST SERPL-CCNC: 14 UNIT/L (ref 5–34)
BASOPHILS # BLD AUTO: 0 X10(3)/MCL (ref 0–0.2)
BASOPHILS NFR BLD AUTO: 0.8 %
BILIRUB SERPL-MCNC: 0.4 MG/DL
BILIRUBIN DIRECT+TOT PNL SERPL-MCNC: 0.1 MG/DL (ref 0–0.5)
BILIRUBIN DIRECT+TOT PNL SERPL-MCNC: 0.3 MG/DL (ref 0–0.8)
BUN SERPL-MCNC: 19.4 MG/DL (ref 8.4–25.7)
CALCIUM SERPL-MCNC: 9.4 MG/DL (ref 8.7–10.5)
CHLORIDE SERPL-SCNC: 105 MMOL/L (ref 98–107)
CO2 SERPL-SCNC: 25 MMOL/L (ref 22–29)
CREAT SERPL-MCNC: 0.85 MG/DL (ref 0.73–1.18)
DEPRECATED CALCIDIOL+CALCIFEROL SERPL-MC: 19.7 NG/ML (ref 30–80)
EOSINOPHIL # BLD AUTO: 0.1 X10(3)/MCL (ref 0–0.9)
EOSINOPHIL NFR BLD AUTO: 2.9 %
ERYTHROCYTE [DISTWIDTH] IN BLOOD BY AUTOMATED COUNT: 11.8 % (ref 11.5–17)
GLOBULIN SER-MCNC: 2.9 GM/DL (ref 2.4–3.5)
GLUCOSE SERPL-MCNC: 94 MG/DL (ref 74–100)
HCT VFR BLD AUTO: 43 % (ref 42–52)
HGB BLD-MCNC: 14.1 GM/DL (ref 14–18)
LYMPHOCYTES # BLD AUTO: 1.2 X10(3)/MCL (ref 0.6–4.6)
LYMPHOCYTES NFR BLD AUTO: 24.1 %
MCH RBC QN AUTO: 29.1 PG (ref 27–31)
MCHC RBC AUTO-ENTMCNC: 32.8 GM/DL (ref 33–36)
MCV RBC AUTO: 88.7 FL (ref 80–94)
MONOCYTES # BLD AUTO: 0.4 X10(3)/MCL (ref 0.1–1.3)
MONOCYTES NFR BLD AUTO: 8.2 %
NEUTROPHILS # BLD AUTO: 3.1 X10(3)/MCL (ref 2.1–9.2)
NEUTROPHILS NFR BLD AUTO: 63.8 %
PLATELET # BLD AUTO: 204 X10(3)/MCL (ref 130–400)
PMV BLD AUTO: 9.1 FL (ref 9.4–12.4)
POTASSIUM SERPL-SCNC: 4.6 MMOL/L (ref 3.5–5.1)
PROT SERPL-MCNC: 6.5 GM/DL (ref 6.4–8.3)
PSA SERPL-MCNC: 0.19 NG/ML
RBC # BLD AUTO: 4.85 X10(6)/MCL (ref 4.7–6.1)
SODIUM SERPL-SCNC: 139 MMOL/L (ref 136–145)
WBC # SPEC AUTO: 4.9 X10(3)/MCL (ref 4.5–11.5)

## 2022-01-14 ENCOUNTER — HISTORICAL (OUTPATIENT)
Dept: HEMATOLOGY/ONCOLOGY | Facility: CLINIC | Age: 62
End: 2022-01-14

## 2022-02-09 ENCOUNTER — HISTORICAL (OUTPATIENT)
Dept: INFUSION THERAPY | Facility: HOSPITAL | Age: 62
End: 2022-02-09

## 2022-02-09 LAB
ABS NEUT (OLG): 3.8 (ref 2.1–9.2)
ALBUMIN SERPL-MCNC: 3.5 G/DL (ref 3.5–5)
ALBUMIN/GLOB SERPL: 1.1 {RATIO} (ref 1.1–2)
ALP SERPL-CCNC: 73 U/L (ref 40–150)
ALT SERPL-CCNC: 23 U/L (ref 0–55)
AST SERPL-CCNC: 21 U/L (ref 5–34)
BASOPHILS # BLD AUTO: 0 10*3/UL (ref 0–0.2)
BASOPHILS NFR BLD AUTO: 0.9 %
BILIRUB SERPL-MCNC: 0.3 MG/DL
BILIRUBIN DIRECT+TOT PNL SERPL-MCNC: 0.1 (ref 0–0.5)
BILIRUBIN DIRECT+TOT PNL SERPL-MCNC: 0.2 (ref 0–0.8)
BUN SERPL-MCNC: 18.2 MG/DL (ref 8.4–25.7)
CALCIUM SERPL-MCNC: 9.2 MG/DL (ref 8.7–10.5)
CHLORIDE SERPL-SCNC: 102 MMOL/L (ref 98–107)
CO2 SERPL-SCNC: 27 MMOL/L (ref 22–29)
CREAT SERPL-MCNC: 0.89 MG/DL (ref 0.73–1.18)
EOSINOPHIL # BLD AUTO: 0.3 10*3/UL (ref 0–0.9)
EOSINOPHIL NFR BLD AUTO: 5 %
ERYTHROCYTE [DISTWIDTH] IN BLOOD BY AUTOMATED COUNT: 11.5 % (ref 11.5–17)
GLOBULIN SER-MCNC: 3.2 G/DL (ref 2.4–3.5)
GLUCOSE SERPL-MCNC: 97 MG/DL (ref 74–100)
HCT VFR BLD AUTO: 45.5 % (ref 42–52)
HEMOLYSIS INTERF INDEX SERPL-ACNC: 7
HGB BLD-MCNC: 14.7 G/DL (ref 14–18)
ICTERIC INTERF INDEX SERPL-ACNC: 0
LIPEMIC INTERF INDEX SERPL-ACNC: 8
LYMPHOCYTES # BLD AUTO: 1.1 10*3/UL (ref 0.6–4.6)
LYMPHOCYTES NFR BLD AUTO: 19.1 %
MANUAL DIFF? (OHS): NO
MCH RBC QN AUTO: 28.7 PG (ref 27–31)
MCHC RBC AUTO-ENTMCNC: 32.3 G/DL (ref 33–36)
MCV RBC AUTO: 88.9 FL (ref 80–94)
MONOCYTES # BLD AUTO: 0.4 10*3/UL (ref 0.1–1.3)
MONOCYTES NFR BLD AUTO: 6.8 %
NEUTROPHILS # BLD AUTO: 3.8 10*3/UL (ref 2.1–9.2)
NEUTROPHILS NFR BLD AUTO: 67.7 %
PLATELET # BLD AUTO: 244 10*3/UL (ref 130–400)
PMV BLD AUTO: 8.7 FL (ref 9.4–12.4)
POTASSIUM SERPL-SCNC: 4.7 MMOL/L (ref 3.5–5.1)
PROT SERPL-MCNC: 6.7 G/DL (ref 6.4–8.3)
PSA SERPL-MCNC: 0.27 NG/ML
RBC # BLD AUTO: 5.12 10*6/UL (ref 4.7–6.1)
SODIUM SERPL-SCNC: 138 MMOL/L (ref 136–145)
WBC # SPEC AUTO: 5.6 10*3/UL (ref 4.5–11.5)

## 2022-03-11 ENCOUNTER — HISTORICAL (OUTPATIENT)
Dept: ADMINISTRATIVE | Facility: HOSPITAL | Age: 62
End: 2022-03-11

## 2022-03-11 ENCOUNTER — HISTORICAL (OUTPATIENT)
Dept: RADIOLOGY | Facility: HOSPITAL | Age: 62
End: 2022-03-11

## 2022-03-11 LAB
ALBUMIN SERPL-MCNC: 3.6 G/DL (ref 3.5–5)
ALBUMIN/GLOB SERPL: 1.1 {RATIO} (ref 1.1–2)
ALP SERPL-CCNC: 66 U/L (ref 40–150)
ALT SERPL-CCNC: 12 U/L (ref 0–55)
AST SERPL-CCNC: 15 U/L (ref 5–34)
BILIRUB SERPL-MCNC: 0.5 MG/DL
BILIRUBIN DIRECT+TOT PNL SERPL-MCNC: 0.2 (ref 0–0.5)
BILIRUBIN DIRECT+TOT PNL SERPL-MCNC: 0.3 (ref 0–0.8)
BUN SERPL-MCNC: 14.7 MG/DL (ref 8.4–25.7)
CALCIUM SERPL-MCNC: 8.7 MG/DL (ref 8.7–10.5)
CHLORIDE SERPL-SCNC: 104 MMOL/L (ref 98–107)
CO2 SERPL-SCNC: 27 MMOL/L (ref 22–29)
CREAT SERPL-MCNC: 0.92 MG/DL (ref 0.73–1.18)
GLOBULIN SER-MCNC: 3.3 G/DL (ref 2.4–3.5)
GLUCOSE SERPL-MCNC: 91 MG/DL (ref 74–100)
HEMOLYSIS INTERF INDEX SERPL-ACNC: 6
ICTERIC INTERF INDEX SERPL-ACNC: 1
LIPEMIC INTERF INDEX SERPL-ACNC: 10
POTASSIUM SERPL-SCNC: 4.4 MMOL/L (ref 3.5–5.1)
PROT SERPL-MCNC: 6.9 G/DL (ref 6.4–8.3)
PSA SERPL-MCNC: 0.3 NG/ML
SODIUM SERPL-SCNC: 138 MMOL/L (ref 136–145)

## 2022-04-05 DIAGNOSIS — C61 PROSTATE CANCER: ICD-10-CM

## 2022-05-09 DIAGNOSIS — C61 PROSTATE CANCER: Primary | ICD-10-CM

## 2022-05-13 ENCOUNTER — LAB VISIT (OUTPATIENT)
Dept: LAB | Facility: HOSPITAL | Age: 62
End: 2022-05-13
Payer: COMMERCIAL

## 2022-05-13 ENCOUNTER — INFUSION (OUTPATIENT)
Dept: INFUSION THERAPY | Facility: HOSPITAL | Age: 62
End: 2022-05-13
Attending: INTERNAL MEDICINE
Payer: COMMERCIAL

## 2022-05-13 VITALS
HEIGHT: 68 IN | RESPIRATION RATE: 20 BRPM | HEART RATE: 65 BPM | TEMPERATURE: 98 F | SYSTOLIC BLOOD PRESSURE: 153 MMHG | DIASTOLIC BLOOD PRESSURE: 94 MMHG | WEIGHT: 245 LBS | BODY MASS INDEX: 37.13 KG/M2

## 2022-05-13 DIAGNOSIS — C61 PROSTATE CANCER: ICD-10-CM

## 2022-05-13 DIAGNOSIS — C61 PROSTATE CANCER: Primary | ICD-10-CM

## 2022-05-13 LAB
ALBUMIN SERPL-MCNC: 3.4 GM/DL (ref 3.5–5)
ALBUMIN/GLOB SERPL: 1.3 RATIO (ref 1.1–2)
ALP SERPL-CCNC: 73 UNIT/L (ref 40–150)
ALT SERPL-CCNC: 11 UNIT/L (ref 0–55)
AST SERPL-CCNC: 16 UNIT/L (ref 5–34)
BASOPHILS # BLD AUTO: 0.03 X10(3)/MCL (ref 0–0.2)
BASOPHILS NFR BLD AUTO: 0.6 %
BILIRUBIN DIRECT+TOT PNL SERPL-MCNC: 0.3 MG/DL
BUN SERPL-MCNC: 20.6 MG/DL (ref 8.4–25.7)
CALCIUM SERPL-MCNC: 9 MG/DL (ref 8.4–10.2)
CHLORIDE SERPL-SCNC: 105 MMOL/L (ref 98–107)
CO2 SERPL-SCNC: 31 MMOL/L (ref 22–29)
CREAT SERPL-MCNC: 0.87 MG/DL (ref 0.73–1.18)
EOSINOPHIL # BLD AUTO: 0.16 X10(3)/MCL (ref 0–0.9)
EOSINOPHIL NFR BLD AUTO: 3.4 %
ERYTHROCYTE [DISTWIDTH] IN BLOOD BY AUTOMATED COUNT: 12.1 % (ref 11.5–17)
FREE/TOTAL PSA (OHS): <16.7 %
GLOBULIN SER-MCNC: 2.6 GM/DL (ref 2.4–3.5)
GLUCOSE SERPL-MCNC: 85 MG/DL (ref 74–100)
HCT VFR BLD AUTO: 41.9 % (ref 42–52)
HGB BLD-MCNC: 13.3 GM/DL (ref 14–18)
IMM GRANULOCYTES # BLD AUTO: 0.01 X10(3)/MCL (ref 0–0.02)
IMM GRANULOCYTES NFR BLD AUTO: 0.2 % (ref 0–0.43)
LYMPHOCYTES # BLD AUTO: 1.17 X10(3)/MCL (ref 0.6–4.6)
LYMPHOCYTES NFR BLD AUTO: 24.7 %
MCH RBC QN AUTO: 29.2 PG (ref 27–31)
MCHC RBC AUTO-ENTMCNC: 31.7 MG/DL (ref 33–36)
MCV RBC AUTO: 92.1 FL (ref 80–94)
MONOCYTES # BLD AUTO: 0.47 X10(3)/MCL (ref 0.1–1.3)
MONOCYTES NFR BLD AUTO: 9.9 %
NEUTROPHILS # BLD AUTO: 2.9 X10(3)/MCL (ref 2.1–9.2)
NEUTROPHILS NFR BLD AUTO: 61.2 %
PLATELET # BLD AUTO: 214 X10(3)/MCL (ref 130–400)
PMV BLD AUTO: 9 FL (ref 9.4–12.4)
POTASSIUM SERPL-SCNC: 4.5 MMOL/L (ref 3.5–5.1)
PROT SERPL-MCNC: 6 GM/DL (ref 6.4–8.3)
PSA FREE MFR SERPL: <17 %
PSA FREE SERPL-MCNC: <0.1 NG/ML
PSA SERPL-MCNC: 0.6 NG/ML
RBC # BLD AUTO: 4.55 X10(6)/MCL (ref 4.7–6.1)
SODIUM SERPL-SCNC: 142 MMOL/L (ref 136–145)
WBC # SPEC AUTO: 4.7 X10(3)/MCL (ref 4.5–11.5)

## 2022-05-13 PROCEDURE — 84153 ASSAY OF PSA TOTAL: CPT

## 2022-05-13 PROCEDURE — 85025 COMPLETE CBC W/AUTO DIFF WBC: CPT

## 2022-05-13 PROCEDURE — 96372 THER/PROPH/DIAG INJ SC/IM: CPT

## 2022-05-13 PROCEDURE — 11980 IMPLANT HORMONE PELLET(S): CPT

## 2022-05-13 PROCEDURE — 80053 COMPREHEN METABOLIC PANEL: CPT

## 2022-05-13 PROCEDURE — 63600175 PHARM REV CODE 636 W HCPCS: Mod: JG | Performed by: INTERNAL MEDICINE

## 2022-05-13 PROCEDURE — 96402 CHEMO HORMON ANTINEOPL SQ/IM: CPT

## 2022-05-13 PROCEDURE — 84154 ASSAY OF PSA FREE: CPT

## 2022-05-13 PROCEDURE — 36415 COLL VENOUS BLD VENIPUNCTURE: CPT

## 2022-05-13 RX ADMIN — DENOSUMAB 60 MG: 60 INJECTION SUBCUTANEOUS at 01:05

## 2022-05-13 RX ADMIN — LEUPROLIDE ACETATE 22.5 MG: KIT at 01:05

## 2022-05-18 PROBLEM — C79.51 BONE METASTASES: Status: ACTIVE | Noted: 2022-05-18

## 2022-05-19 DIAGNOSIS — C61 PROSTATE CANCER: Primary | ICD-10-CM

## 2022-06-10 DIAGNOSIS — C61 PROSTATE CANCER: Primary | ICD-10-CM

## 2022-07-08 NOTE — PROGRESS NOTES
Subjective:       Patient ID: Paz Dunne is a 61 y.o. male.    Chief Complaint: Follow-up (PET results. Patient stated no new problems )    Referring Physician:  Dr Marino  PCP:  Dr. Sina Fenton     Diagnosis:  1. Hormone refractory prostate cancer-- c/w biochemical recurrence in Sept 2014, with subsequent suspicious bone mets in right shoulder in March 2017.    MRI findings were equivocal but patient had increased pain and was seen by ortho, with high degree of suspicion for mets.  Underwent palliative radiation.   2.  h/o cT3b [place] Adenocarcinoma of the prostate dx in 8/13 status post biopsy; Baseline PSA of 56.3-- put on [facility] Clinical trial with androgen deprivation preoperatively followed by radical prostatectomy done March 10, 2014, which showed extracapsular extension, seminal vesicle invasion, no lymph node invasion, no lymphovascular invasion, but with positive surgical margins.     He then completed external beam radiation therapy in August 2014.  3.  Gynecomastia with tender bilateral breast tissue in the subareolar/periareolar region noted by the patient in early June 2015--> intermittent.   4.  'Benign' osteosclerotic lesions read by radiology of CT from 11/11/16 at the left acetabulum, right proximal femur, and right ilium near the SI joint.    Stable on f/u CT/bone scans 2/24/17  5. DEXA on 3/22/19 noted osteopenia of both left/right femoral neck      Current Treatment:   1.  Lupron 22.5 mg IM every 3 months--restarted July 2015.    2.  Xtandi 160 mg p.o. daily started early April 2017  3.  Prolia Q6M started 3/2019     Treatment History:  1.  Preoperative androgen deprivation therapy with LHRH agonist given at [facility] at the time of diagnosis in August 2013.  2.  Radical prostatectomy done March 10, 2014 showing extracapsular extension, seminal vesicle invasion, no lymph node or lymphovascular invasion, and positive surgical margins.  3.  Biochemical recurrence of disease noted  September 2014 with negative radiographic findings--> was restarted on Lupron along with Casodex at that time.  4.  Lupron last given September 2014.  Was discontinued in March 2015 with Casodex continued on.    5.  Casodex started September 2014 discontinued July 2015 secondary to breast tenderness  6. Casodex 50 mg po daily 11/22/16--discontinued after 3 doses secondary to severe shortness of breath  7.  Palliative radiation to the right shoulder ×10 treatments completed from August 21 - September 1, 2017.    HPI   Patient with a history of locally advanced prostate cancer who underwent definitive surgical resection followed by adjuvant radiation.  He then had a short course of Lupron for biochemical recurrence in September of 2014. In March of 2015, he was having increasing side effects from Lupron, switched to Casodex alone.  Scans in November of 2016 showed no definitive evidence of disease.  Due to rising PSA, he underwent scans in February 2017, the showed stable subcentimeter liver lesions and stable punctate sclerotic lesions in the bony pelvis.  Due to shoulder pain, an MRI of the right shoulder was done on 03/20 4th 2017 that showed altered bone marrow signal involving the glenoid and inferior scapular spine with mild bone marrow edema, review by Dr. Carpenter was thought that this was a metastatic bone lesion.  He was started on Xtandi in April of 2017.  He then received 10 fractions of radiation from 08/21/2017 through 09/01/2017 to the right shoulder.  He has been undergoing routine surveillance including PSAs and scans.  He had an increase in his PSA and a CT scan along with bone scan on 03/11/2022 that showed no evidence of disease.  PSMA scan was done on 7 6th 22, this revealed sclerotic changes in the right scapula with moderate uptake representing known metastatic disease and no other suspicious uptake.      Interval History:   Patient in clinic today for scheduled follow up, wife present.  Doing well  overall.  He states he feels well.  He continues to tolerate Xtandi without issue.      Past Medical History:   Diagnosis Date    Bone metastases     Dysphagia     Elevated PSA     Hiatal hernia     Hypertension     no meds    Inflammatory bowel disease     diverticulitis    Prostate cancer     Restless leg       Past Surgical History:   Procedure Laterality Date    APPENDECTOMY      CARPAL TUNNEL RELEASE  11/2021    COLONOSCOPY      CYSTOSCOPY      laparoscopic hiatal repair with mesh and Toupet fundoplication      open prostatectomy      PENILE PROSTHESIS IMPLANT      PROSTATE SURGERY      positive bx    SHOULDER SURGERY Right 04/27/2021    SKIN CANCER EXCISION      WRIST SURGERY      plate     Social History     Socioeconomic History    Marital status:    Tobacco Use    Smoking status: Former Smoker     Years: 4.00     Types: Cigarettes    Smokeless tobacco: Never Used   Substance and Sexual Activity    Alcohol use: No     Comment: social    Drug use: No    Sexual activity: Yes     Partners: Female      Family History   Problem Relation Age of Onset    Cancer Paternal Grandfather     Cancer Cousin     Cancer Other       Review of patient's allergies indicates:   Allergen Reactions    Ropinirole Other (See Comments)     Loss of consciousness  Other reaction(s): Faint, Hypotension    Tetanus and diphther. tox (pf) Other (See Comments)     hypotension    Tetanus toxoid     Tetanus vaccines and toxoid       Review of Systems   Constitutional: Negative for appetite change and unexpected weight change.   HENT: Negative for mouth sores.    Eyes: Negative for visual disturbance.   Respiratory: Negative for cough and shortness of breath.    Cardiovascular: Negative for chest pain.   Gastrointestinal: Negative for abdominal pain and diarrhea.   Genitourinary: Negative for frequency.   Musculoskeletal: Negative for back pain.   Integumentary:  Negative for rash.   Neurological:  Negative for headaches.   Hematological: Negative for adenopathy.   Psychiatric/Behavioral: The patient is not nervous/anxious.          Objective:      Physical Exam  Vitals reviewed.   Constitutional:       General: He is awake.      Appearance: Normal appearance.   HENT:      Head: Normocephalic and atraumatic.      Right Ear: Hearing normal.      Left Ear: Hearing normal.      Nose: Nose normal.   Eyes:      General: Lids are normal. Vision grossly intact.      Extraocular Movements: Extraocular movements intact.      Conjunctiva/sclera: Conjunctivae normal.   Cardiovascular:      Rate and Rhythm: Normal rate and regular rhythm.      Pulses: Normal pulses.      Heart sounds: Normal heart sounds.   Pulmonary:      Effort: Pulmonary effort is normal.      Breath sounds: Normal breath sounds. No wheezing, rhonchi or rales.   Chest:   Breasts:      Right: No axillary adenopathy or supraclavicular adenopathy.      Left: No axillary adenopathy or supraclavicular adenopathy.       Abdominal:      General: Bowel sounds are normal.      Palpations: Abdomen is soft.      Tenderness: There is no abdominal tenderness.   Musculoskeletal:      Cervical back: Full passive range of motion without pain.      Right lower leg: No edema.      Left lower leg: No edema.   Lymphadenopathy:      Cervical: No cervical adenopathy.      Upper Body:      Right upper body: No supraclavicular or axillary adenopathy.      Left upper body: No supraclavicular or axillary adenopathy.   Skin:     General: Skin is warm.   Neurological:      General: No focal deficit present.      Mental Status: He is alert and oriented to person, place, and time.   Psychiatric:         Attention and Perception: Attention normal.         Mood and Affect: Mood and affect normal.         Behavior: Behavior is cooperative.         LABS AND IMAGING REVIEWED IN EPIC          Assessment:     1. Hormone refractory prostate cancer-- c/w biochemical recurrence in Sept 2014,  with subsequent suspicious bone mets in right shoulder in March 2017.    MRI findings were equivocal but patient had increased pain and was seen by ortho, with high degree of suspicion for mets.  Underwent palliative radiation.   2.  h/o cT3b [place] Adenocarcinoma of the prostate dx in 8/13 status post biopsy; Baseline PSA of 56.3-- put on [facility] Clinical trial with androgen deprivation preoperatively followed by radical prostatectomy done March 10, 2014, which showed extracapsular extension, seminal vesicle invasion, no lymph node invasion, no lymphovascular invasion, but with positive surgical margins.  He then completed external beam radiation therapy in August 2014.  3.  Gynecomastia with tender bilateral breast tissue in the subareolar/periareolar region noted by the patient in early June 2015--> intermittent.   4.  'Benign' osteosclerotic lesions read by radiology of CT from 11/11/16 at the left acetabulum, right proximal femur, and right ilium near the SI joint.    Stable on f/u CT/bone scans 2/24/17  5. DEXA on 3/22/19 noted osteopenia of both left/right femoral neck       Plan:         Doing well overall from oncological standpoint    PSMA scan showed no evidence of new or worsening disease.    Plan to continue Xtandi 160 mg p.o. daily for now    Continue Lupron every 3 months    Continue Prolia every 6 months; DEXA scan due in 3/2023     CBC, CMP, PSA today    RTC in 8 weeks with labs prior: CBC, CMP, PSA      All questions were answered to the best of my ability and the patient understands the plan moving forward.        Nhan Payne II, MD I, Latanya Hernadez LPN, acted solely as a scribe for and in the presence of, Dr. Nhan Payne who performed the service.

## 2022-07-11 ENCOUNTER — OFFICE VISIT (OUTPATIENT)
Dept: HEMATOLOGY/ONCOLOGY | Facility: CLINIC | Age: 62
End: 2022-07-11
Payer: COMMERCIAL

## 2022-07-11 VITALS
WEIGHT: 245.81 LBS | RESPIRATION RATE: 18 BRPM | TEMPERATURE: 99 F | OXYGEN SATURATION: 98 % | BODY MASS INDEX: 35.19 KG/M2 | HEART RATE: 66 BPM | HEIGHT: 70 IN | DIASTOLIC BLOOD PRESSURE: 88 MMHG | SYSTOLIC BLOOD PRESSURE: 157 MMHG

## 2022-07-11 DIAGNOSIS — C61 PROSTATE CANCER: Primary | ICD-10-CM

## 2022-07-11 LAB
ALBUMIN SERPL-MCNC: 3.5 GM/DL (ref 3.5–5)
ALBUMIN/GLOB SERPL: 1.3 RATIO (ref 1.1–2)
ALP SERPL-CCNC: 86 UNIT/L (ref 40–150)
ALT SERPL-CCNC: 13 UNIT/L (ref 0–55)
AST SERPL-CCNC: 15 UNIT/L (ref 5–34)
BASOPHILS # BLD AUTO: 0.03 X10(3)/MCL (ref 0–0.2)
BASOPHILS NFR BLD AUTO: 0.5 %
BILIRUBIN DIRECT+TOT PNL SERPL-MCNC: 0.4 MG/DL
BUN SERPL-MCNC: 18.1 MG/DL (ref 8.4–25.7)
CALCIUM SERPL-MCNC: 9.1 MG/DL (ref 8.4–10.2)
CHLORIDE SERPL-SCNC: 102 MMOL/L (ref 98–107)
CO2 SERPL-SCNC: 29 MMOL/L (ref 22–29)
CREAT SERPL-MCNC: 0.94 MG/DL (ref 0.73–1.18)
EOSINOPHIL # BLD AUTO: 0.18 X10(3)/MCL (ref 0–0.9)
EOSINOPHIL NFR BLD AUTO: 3 %
ERYTHROCYTE [DISTWIDTH] IN BLOOD BY AUTOMATED COUNT: 11.8 % (ref 11.5–17)
FREE/TOTAL PSA (OHS): <27 %
GLOBULIN SER-MCNC: 2.8 GM/DL (ref 2.4–3.5)
GLUCOSE SERPL-MCNC: 99 MG/DL (ref 74–100)
HCT VFR BLD AUTO: 43.5 % (ref 42–52)
HGB BLD-MCNC: 13.9 GM/DL (ref 14–18)
IMM GRANULOCYTES # BLD AUTO: 0.01 X10(3)/MCL (ref 0–0.04)
IMM GRANULOCYTES NFR BLD AUTO: 0.2 %
LYMPHOCYTES # BLD AUTO: 1.09 X10(3)/MCL (ref 0.6–4.6)
LYMPHOCYTES NFR BLD AUTO: 17.9 %
MCH RBC QN AUTO: 29.3 PG (ref 27–31)
MCHC RBC AUTO-ENTMCNC: 32 MG/DL (ref 33–36)
MCV RBC AUTO: 91.6 FL (ref 80–94)
MONOCYTES # BLD AUTO: 0.47 X10(3)/MCL (ref 0.1–1.3)
MONOCYTES NFR BLD AUTO: 7.7 %
NEUTROPHILS # BLD AUTO: 4.3 X10(3)/MCL (ref 2.1–9.2)
NEUTROPHILS NFR BLD AUTO: 70.7 %
PLATELET # BLD AUTO: 201 X10(3)/MCL (ref 130–400)
PMV BLD AUTO: 9 FL (ref 7.4–10.4)
POTASSIUM SERPL-SCNC: 4.8 MMOL/L (ref 3.5–5.1)
PROT SERPL-MCNC: 6.3 GM/DL (ref 6.4–8.3)
PSA FREE MFR SERPL: <27 %
PSA FREE SERPL-MCNC: <0.1 NG/ML
PSA SERPL-MCNC: 0.37 NG/ML
RBC # BLD AUTO: 4.75 X10(6)/MCL (ref 4.7–6.1)
SODIUM SERPL-SCNC: 139 MMOL/L (ref 136–145)
WBC # SPEC AUTO: 6.1 X10(3)/MCL (ref 4.5–11.5)

## 2022-07-11 PROCEDURE — 99999 PR PBB SHADOW E&M-EST. PATIENT-LVL IV: CPT | Mod: PBBFAC,,, | Performed by: INTERNAL MEDICINE

## 2022-07-11 PROCEDURE — 1159F MED LIST DOCD IN RCRD: CPT | Mod: CPTII,S$GLB,, | Performed by: INTERNAL MEDICINE

## 2022-07-11 PROCEDURE — 3008F PR BODY MASS INDEX (BMI) DOCUMENTED: ICD-10-PCS | Mod: CPTII,S$GLB,, | Performed by: INTERNAL MEDICINE

## 2022-07-11 PROCEDURE — 99214 OFFICE O/P EST MOD 30 MIN: CPT | Mod: S$GLB,,, | Performed by: INTERNAL MEDICINE

## 2022-07-11 PROCEDURE — 1160F RVW MEDS BY RX/DR IN RCRD: CPT | Mod: CPTII,S$GLB,, | Performed by: INTERNAL MEDICINE

## 2022-07-11 PROCEDURE — 84154 ASSAY OF PSA FREE: CPT | Performed by: INTERNAL MEDICINE

## 2022-07-11 PROCEDURE — 3077F SYST BP >= 140 MM HG: CPT | Mod: CPTII,S$GLB,, | Performed by: INTERNAL MEDICINE

## 2022-07-11 PROCEDURE — 1160F PR REVIEW ALL MEDS BY PRESCRIBER/CLIN PHARMACIST DOCUMENTED: ICD-10-PCS | Mod: CPTII,S$GLB,, | Performed by: INTERNAL MEDICINE

## 2022-07-11 PROCEDURE — 3079F PR MOST RECENT DIASTOLIC BLOOD PRESSURE 80-89 MM HG: ICD-10-PCS | Mod: CPTII,S$GLB,, | Performed by: INTERNAL MEDICINE

## 2022-07-11 PROCEDURE — 1159F PR MEDICATION LIST DOCUMENTED IN MEDICAL RECORD: ICD-10-PCS | Mod: CPTII,S$GLB,, | Performed by: INTERNAL MEDICINE

## 2022-07-11 PROCEDURE — 85025 COMPLETE CBC W/AUTO DIFF WBC: CPT | Performed by: INTERNAL MEDICINE

## 2022-07-11 PROCEDURE — 3077F PR MOST RECENT SYSTOLIC BLOOD PRESSURE >= 140 MM HG: ICD-10-PCS | Mod: CPTII,S$GLB,, | Performed by: INTERNAL MEDICINE

## 2022-07-11 PROCEDURE — 3079F DIAST BP 80-89 MM HG: CPT | Mod: CPTII,S$GLB,, | Performed by: INTERNAL MEDICINE

## 2022-07-11 PROCEDURE — 80053 COMPREHEN METABOLIC PANEL: CPT | Performed by: INTERNAL MEDICINE

## 2022-07-11 PROCEDURE — 99214 PR OFFICE/OUTPT VISIT, EST, LEVL IV, 30-39 MIN: ICD-10-PCS | Mod: S$GLB,,, | Performed by: INTERNAL MEDICINE

## 2022-07-11 PROCEDURE — 99999 PR PBB SHADOW E&M-EST. PATIENT-LVL IV: ICD-10-PCS | Mod: PBBFAC,,, | Performed by: INTERNAL MEDICINE

## 2022-07-11 PROCEDURE — 84153 ASSAY OF PSA TOTAL: CPT | Performed by: INTERNAL MEDICINE

## 2022-07-11 PROCEDURE — 3008F BODY MASS INDEX DOCD: CPT | Mod: CPTII,S$GLB,, | Performed by: INTERNAL MEDICINE

## 2022-07-11 PROCEDURE — 36415 COLL VENOUS BLD VENIPUNCTURE: CPT | Performed by: INTERNAL MEDICINE

## 2022-07-28 ENCOUNTER — INFUSION (OUTPATIENT)
Dept: INFUSION THERAPY | Facility: HOSPITAL | Age: 62
End: 2022-07-28
Attending: INTERNAL MEDICINE
Payer: COMMERCIAL

## 2022-07-28 VITALS
HEIGHT: 70 IN | SYSTOLIC BLOOD PRESSURE: 132 MMHG | TEMPERATURE: 98 F | RESPIRATION RATE: 20 BRPM | DIASTOLIC BLOOD PRESSURE: 79 MMHG | BODY MASS INDEX: 35.19 KG/M2 | HEART RATE: 74 BPM | WEIGHT: 245.81 LBS

## 2022-07-28 DIAGNOSIS — C79.51 BONE METASTASES: ICD-10-CM

## 2022-07-28 DIAGNOSIS — C61 PROSTATE CANCER: Primary | ICD-10-CM

## 2022-07-28 PROCEDURE — 63600175 PHARM REV CODE 636 W HCPCS: Mod: JG | Performed by: NURSE PRACTITIONER

## 2022-07-28 PROCEDURE — 96372 THER/PROPH/DIAG INJ SC/IM: CPT

## 2022-07-28 RX ADMIN — LEUPROLIDE ACETATE 22.5 MG: KIT at 09:07

## 2022-08-04 ENCOUNTER — PATIENT MESSAGE (OUTPATIENT)
Dept: INFUSION THERAPY | Facility: HOSPITAL | Age: 62
End: 2022-08-04
Payer: COMMERCIAL

## 2022-08-09 ENCOUNTER — TELEPHONE (OUTPATIENT)
Dept: HEMATOLOGY/ONCOLOGY | Facility: CLINIC | Age: 62
End: 2022-08-09
Payer: COMMERCIAL

## 2022-08-19 DIAGNOSIS — C79.51 BONE METASTASES: ICD-10-CM

## 2022-08-19 DIAGNOSIS — C61 PROSTATE CANCER: Primary | ICD-10-CM

## 2022-09-09 ENCOUNTER — LAB VISIT (OUTPATIENT)
Dept: LAB | Facility: HOSPITAL | Age: 62
End: 2022-09-09
Attending: INTERNAL MEDICINE
Payer: COMMERCIAL

## 2022-09-09 DIAGNOSIS — C61 PROSTATE CANCER: ICD-10-CM

## 2022-09-09 LAB
ALBUMIN SERPL-MCNC: 3.5 GM/DL (ref 3.5–5)
ALBUMIN/GLOB SERPL: 1.2 RATIO (ref 1.1–2)
ALP SERPL-CCNC: 87 UNIT/L (ref 40–150)
ALT SERPL-CCNC: 13 UNIT/L (ref 0–55)
AST SERPL-CCNC: 14 UNIT/L (ref 5–34)
BASOPHILS # BLD AUTO: 0.05 X10(3)/MCL (ref 0–0.2)
BASOPHILS NFR BLD AUTO: 1 %
BILIRUBIN DIRECT+TOT PNL SERPL-MCNC: 0.4 MG/DL
BUN SERPL-MCNC: 14.5 MG/DL (ref 8.4–25.7)
CALCIUM SERPL-MCNC: 8.8 MG/DL (ref 8.4–10.2)
CHLORIDE SERPL-SCNC: 103 MMOL/L (ref 98–107)
CO2 SERPL-SCNC: 27 MMOL/L (ref 22–29)
CREAT SERPL-MCNC: 0.78 MG/DL (ref 0.73–1.18)
EOSINOPHIL # BLD AUTO: 0.14 X10(3)/MCL (ref 0–0.9)
EOSINOPHIL NFR BLD AUTO: 2.7 %
ERYTHROCYTE [DISTWIDTH] IN BLOOD BY AUTOMATED COUNT: 13.6 % (ref 11.5–17)
FREE/TOTAL PSA (OHS): <43.5 %
GFR SERPLBLD CREATININE-BSD FMLA CKD-EPI: >60 MLS/MIN/1.73/M2
GLOBULIN SER-MCNC: 3 GM/DL (ref 2.4–3.5)
GLUCOSE SERPL-MCNC: 95 MG/DL (ref 74–100)
HCT VFR BLD AUTO: 37.2 % (ref 42–52)
HGB BLD-MCNC: 11 GM/DL (ref 14–18)
IMM GRANULOCYTES # BLD AUTO: 0.01 X10(3)/MCL (ref 0–0.04)
IMM GRANULOCYTES NFR BLD AUTO: 0.2 %
LYMPHOCYTES # BLD AUTO: 1.01 X10(3)/MCL (ref 0.6–4.6)
LYMPHOCYTES NFR BLD AUTO: 19.4 %
MCH RBC QN AUTO: 25.6 PG (ref 27–31)
MCHC RBC AUTO-ENTMCNC: 29.6 MG/DL (ref 33–36)
MCV RBC AUTO: 86.5 FL (ref 80–94)
MONOCYTES # BLD AUTO: 0.38 X10(3)/MCL (ref 0.1–1.3)
MONOCYTES NFR BLD AUTO: 7.3 %
NEUTROPHILS # BLD AUTO: 3.6 X10(3)/MCL (ref 2.1–9.2)
NEUTROPHILS NFR BLD AUTO: 69.4 %
PLATELET # BLD AUTO: 289 X10(3)/MCL (ref 130–400)
PMV BLD AUTO: 9 FL (ref 7.4–10.4)
POTASSIUM SERPL-SCNC: 4.4 MMOL/L (ref 3.5–5.1)
PROT SERPL-MCNC: 6.5 GM/DL (ref 6.4–8.3)
PSA FREE MFR SERPL: <43 %
PSA FREE SERPL-MCNC: <0.1 NG/ML
PSA SERPL-MCNC: 0.23 NG/ML
RBC # BLD AUTO: 4.3 X10(6)/MCL (ref 4.7–6.1)
SODIUM SERPL-SCNC: 137 MMOL/L (ref 136–145)
WBC # SPEC AUTO: 5.2 X10(3)/MCL (ref 4.5–11.5)

## 2022-09-09 PROCEDURE — 80053 COMPREHEN METABOLIC PANEL: CPT

## 2022-09-09 PROCEDURE — 36415 COLL VENOUS BLD VENIPUNCTURE: CPT

## 2022-09-09 PROCEDURE — 85025 COMPLETE CBC W/AUTO DIFF WBC: CPT

## 2022-09-09 PROCEDURE — 84153 ASSAY OF PSA TOTAL: CPT

## 2022-09-16 ENCOUNTER — OFFICE VISIT (OUTPATIENT)
Dept: HEMATOLOGY/ONCOLOGY | Facility: CLINIC | Age: 62
End: 2022-09-16
Payer: COMMERCIAL

## 2022-09-16 ENCOUNTER — PATIENT MESSAGE (OUTPATIENT)
Dept: PHARMACY | Facility: CLINIC | Age: 62
End: 2022-09-16
Payer: COMMERCIAL

## 2022-09-16 VITALS
DIASTOLIC BLOOD PRESSURE: 74 MMHG | RESPIRATION RATE: 18 BRPM | BODY MASS INDEX: 36.7 KG/M2 | WEIGHT: 247.81 LBS | OXYGEN SATURATION: 98 % | TEMPERATURE: 98 F | SYSTOLIC BLOOD PRESSURE: 145 MMHG | HEIGHT: 69 IN

## 2022-09-16 DIAGNOSIS — C61 PROSTATE CANCER METASTATIC TO BONE: ICD-10-CM

## 2022-09-16 DIAGNOSIS — C79.51 PROSTATE CANCER METASTATIC TO BONE: ICD-10-CM

## 2022-09-16 DIAGNOSIS — C61 PROSTATE CANCER: Primary | ICD-10-CM

## 2022-09-16 DIAGNOSIS — Z79.899 ON ANTINEOPLASTIC CHEMOTHERAPY: ICD-10-CM

## 2022-09-16 PROBLEM — S68.119A AMPUTATION OF FINGER: Status: ACTIVE | Noted: 2022-08-07

## 2022-09-16 PROBLEM — M89.9 DISORDER OF BONE: Status: ACTIVE | Noted: 2022-09-16

## 2022-09-16 PROCEDURE — 3077F PR MOST RECENT SYSTOLIC BLOOD PRESSURE >= 140 MM HG: ICD-10-PCS | Mod: CPTII,S$GLB,, | Performed by: NURSE PRACTITIONER

## 2022-09-16 PROCEDURE — 1159F MED LIST DOCD IN RCRD: CPT | Mod: CPTII,S$GLB,, | Performed by: NURSE PRACTITIONER

## 2022-09-16 PROCEDURE — 3078F PR MOST RECENT DIASTOLIC BLOOD PRESSURE < 80 MM HG: ICD-10-PCS | Mod: CPTII,S$GLB,, | Performed by: NURSE PRACTITIONER

## 2022-09-16 PROCEDURE — 1160F PR REVIEW ALL MEDS BY PRESCRIBER/CLIN PHARMACIST DOCUMENTED: ICD-10-PCS | Mod: CPTII,S$GLB,, | Performed by: NURSE PRACTITIONER

## 2022-09-16 PROCEDURE — 3078F DIAST BP <80 MM HG: CPT | Mod: CPTII,S$GLB,, | Performed by: NURSE PRACTITIONER

## 2022-09-16 PROCEDURE — 3008F PR BODY MASS INDEX (BMI) DOCUMENTED: ICD-10-PCS | Mod: CPTII,S$GLB,, | Performed by: NURSE PRACTITIONER

## 2022-09-16 PROCEDURE — 99214 PR OFFICE/OUTPT VISIT, EST, LEVL IV, 30-39 MIN: ICD-10-PCS | Mod: S$GLB,,, | Performed by: NURSE PRACTITIONER

## 2022-09-16 PROCEDURE — 99999 PR PBB SHADOW E&M-EST. PATIENT-LVL IV: ICD-10-PCS | Mod: PBBFAC,,, | Performed by: NURSE PRACTITIONER

## 2022-09-16 PROCEDURE — 3008F BODY MASS INDEX DOCD: CPT | Mod: CPTII,S$GLB,, | Performed by: NURSE PRACTITIONER

## 2022-09-16 PROCEDURE — 1160F RVW MEDS BY RX/DR IN RCRD: CPT | Mod: CPTII,S$GLB,, | Performed by: NURSE PRACTITIONER

## 2022-09-16 PROCEDURE — 3077F SYST BP >= 140 MM HG: CPT | Mod: CPTII,S$GLB,, | Performed by: NURSE PRACTITIONER

## 2022-09-16 PROCEDURE — 1159F PR MEDICATION LIST DOCUMENTED IN MEDICAL RECORD: ICD-10-PCS | Mod: CPTII,S$GLB,, | Performed by: NURSE PRACTITIONER

## 2022-09-16 PROCEDURE — 99214 OFFICE O/P EST MOD 30 MIN: CPT | Mod: S$GLB,,, | Performed by: NURSE PRACTITIONER

## 2022-09-16 PROCEDURE — 99999 PR PBB SHADOW E&M-EST. PATIENT-LVL IV: CPT | Mod: PBBFAC,,, | Performed by: NURSE PRACTITIONER

## 2022-09-16 RX ORDER — POLYETHYLENE GLYCOL 3350 17 G/17G
POWDER, FOR SOLUTION ORAL
COMMUNITY
Start: 2022-09-01

## 2022-09-16 RX ORDER — DENOSUMAB 60 MG/ML
60 INJECTION SUBCUTANEOUS
COMMUNITY

## 2022-09-16 RX ORDER — DESVENLAFAXINE SUCCINATE 50 MG/1
50 TABLET, EXTENDED RELEASE ORAL
COMMUNITY
Start: 2022-08-23
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
## 2022-09-30 ENCOUNTER — TELEPHONE (OUTPATIENT)
Dept: HEMATOLOGY/ONCOLOGY | Facility: CLINIC | Age: 62
End: 2022-09-30
Payer: COMMERCIAL

## 2022-10-06 ENCOUNTER — TELEPHONE (OUTPATIENT)
Dept: HEMATOLOGY/ONCOLOGY | Facility: CLINIC | Age: 62
End: 2022-10-06
Payer: COMMERCIAL

## 2022-10-06 NOTE — TELEPHONE ENCOUNTER
Patient asking to s/w  you regarding copay assistance. States that someone contacted him and told him that they would be going through [facility] instead of [facility]. He can be reached @ [phone].

## 2022-10-10 ENCOUNTER — TELEPHONE (OUTPATIENT)
Dept: HEMATOLOGY/ONCOLOGY | Facility: CLINIC | Age: 62
End: 2022-10-10
Payer: COMMERCIAL

## 2022-10-11 ENCOUNTER — SPECIALTY PHARMACY (OUTPATIENT)
Dept: PHARMACY | Facility: CLINIC | Age: 62
End: 2022-10-11
Payer: COMMERCIAL

## 2022-10-11 DIAGNOSIS — C61 PROSTATE CANCER: Primary | ICD-10-CM

## 2022-10-12 DIAGNOSIS — C61 PROSTATE CANCER: ICD-10-CM

## 2022-10-12 NOTE — TELEPHONE ENCOUNTER
Erleada was sent in to replace Xtandi since [facility] was exhausted. Insurance was able to get Xtandi covered for $0 at [facility] for the remainder of this year. Discontinuing Erleada order per SUBHA Waite

## 2022-11-04 ENCOUNTER — LAB VISIT (OUTPATIENT)
Dept: LAB | Facility: HOSPITAL | Age: 62
End: 2022-11-04
Attending: INTERNAL MEDICINE
Payer: COMMERCIAL

## 2022-11-04 DIAGNOSIS — C61 PROSTATE CANCER: ICD-10-CM

## 2022-11-04 LAB
ALBUMIN SERPL-MCNC: 3.6 GM/DL (ref 3.4–4.8)
ALBUMIN/GLOB SERPL: 1.2 RATIO (ref 1.1–2)
ALP SERPL-CCNC: 106 UNIT/L (ref 40–150)
ALT SERPL-CCNC: 14 UNIT/L (ref 0–55)
AST SERPL-CCNC: 16 UNIT/L (ref 5–34)
BASOPHILS # BLD AUTO: 0.03 X10(3)/MCL (ref 0–0.2)
BASOPHILS NFR BLD AUTO: 0.7 %
BILIRUBIN DIRECT+TOT PNL SERPL-MCNC: 0.3 MG/DL
BUN SERPL-MCNC: 18.8 MG/DL (ref 8.4–25.7)
CALCIUM SERPL-MCNC: 9.7 MG/DL (ref 8.8–10)
CHLORIDE SERPL-SCNC: 102 MMOL/L (ref 98–107)
CO2 SERPL-SCNC: 30 MMOL/L (ref 23–31)
CREAT SERPL-MCNC: 1.07 MG/DL (ref 0.73–1.18)
EOSINOPHIL # BLD AUTO: 0.14 X10(3)/MCL (ref 0–0.9)
EOSINOPHIL NFR BLD AUTO: 3.2 %
ERYTHROCYTE [DISTWIDTH] IN BLOOD BY AUTOMATED COUNT: 14.7 % (ref 11.5–17)
FREE/TOTAL PSA (OHS): <20 %
GFR SERPLBLD CREATININE-BSD FMLA CKD-EPI: >60 MLS/MIN/1.73/M2
GLOBULIN SER-MCNC: 3.1 GM/DL (ref 2.4–3.5)
GLUCOSE SERPL-MCNC: 107 MG/DL (ref 82–115)
HCT VFR BLD AUTO: 40.4 % (ref 42–52)
HGB BLD-MCNC: 12.2 GM/DL (ref 14–18)
IMM GRANULOCYTES # BLD AUTO: 0.02 X10(3)/MCL (ref 0–0.04)
IMM GRANULOCYTES NFR BLD AUTO: 0.5 %
LYMPHOCYTES # BLD AUTO: 0.9 X10(3)/MCL (ref 0.6–4.6)
LYMPHOCYTES NFR BLD AUTO: 20.8 %
MCH RBC QN AUTO: 24.6 PG (ref 27–31)
MCHC RBC AUTO-ENTMCNC: 30.2 MG/DL (ref 33–36)
MCV RBC AUTO: 81.5 FL (ref 80–94)
MONOCYTES # BLD AUTO: 0.4 X10(3)/MCL (ref 0.1–1.3)
MONOCYTES NFR BLD AUTO: 9.3 %
NEUTROPHILS # BLD AUTO: 2.8 X10(3)/MCL (ref 2.1–9.2)
NEUTROPHILS NFR BLD AUTO: 65.5 %
PLATELET # BLD AUTO: 236 X10(3)/MCL (ref 130–400)
PMV BLD AUTO: 8.7 FL (ref 7.4–10.4)
POTASSIUM SERPL-SCNC: 4.5 MMOL/L (ref 3.5–5.1)
PROT SERPL-MCNC: 6.7 GM/DL (ref 5.8–7.6)
PSA FREE MFR SERPL: <20 %
PSA FREE SERPL-MCNC: <0.1 NG/ML
PSA SERPL-MCNC: 0.5 NG/ML
RBC # BLD AUTO: 4.96 X10(6)/MCL (ref 4.7–6.1)
SODIUM SERPL-SCNC: 139 MMOL/L (ref 136–145)
WBC # SPEC AUTO: 4.3 X10(3)/MCL (ref 4.5–11.5)

## 2022-11-04 PROCEDURE — 84154 ASSAY OF PSA FREE: CPT

## 2022-11-04 PROCEDURE — 85025 COMPLETE CBC W/AUTO DIFF WBC: CPT

## 2022-11-04 PROCEDURE — 36415 COLL VENOUS BLD VENIPUNCTURE: CPT

## 2022-11-04 PROCEDURE — 80053 COMPREHEN METABOLIC PANEL: CPT

## 2022-11-04 PROCEDURE — 84153 ASSAY OF PSA TOTAL: CPT

## 2022-11-10 ENCOUNTER — INFUSION (OUTPATIENT)
Dept: INFUSION THERAPY | Facility: HOSPITAL | Age: 62
End: 2022-11-10
Attending: FAMILY MEDICINE
Payer: COMMERCIAL

## 2022-11-10 ENCOUNTER — OFFICE VISIT (OUTPATIENT)
Dept: HEMATOLOGY/ONCOLOGY | Facility: CLINIC | Age: 62
End: 2022-11-10
Payer: COMMERCIAL

## 2022-11-10 VITALS
OXYGEN SATURATION: 97 % | BODY MASS INDEX: 36.51 KG/M2 | SYSTOLIC BLOOD PRESSURE: 145 MMHG | WEIGHT: 246.5 LBS | HEART RATE: 81 BPM | TEMPERATURE: 98 F | RESPIRATION RATE: 18 BRPM | DIASTOLIC BLOOD PRESSURE: 94 MMHG | HEIGHT: 69 IN

## 2022-11-10 DIAGNOSIS — C61 PROSTATE CANCER: Primary | ICD-10-CM

## 2022-11-10 DIAGNOSIS — C79.51 BONE METASTASES: ICD-10-CM

## 2022-11-10 PROCEDURE — 99214 OFFICE O/P EST MOD 30 MIN: CPT | Mod: S$GLB,,, | Performed by: INTERNAL MEDICINE

## 2022-11-10 PROCEDURE — 99999 PR PBB SHADOW E&M-EST. PATIENT-LVL IV: ICD-10-PCS | Mod: PBBFAC,,, | Performed by: INTERNAL MEDICINE

## 2022-11-10 PROCEDURE — 1159F MED LIST DOCD IN RCRD: CPT | Mod: CPTII,S$GLB,, | Performed by: INTERNAL MEDICINE

## 2022-11-10 PROCEDURE — 96372 THER/PROPH/DIAG INJ SC/IM: CPT | Mod: 59

## 2022-11-10 PROCEDURE — 3077F PR MOST RECENT SYSTOLIC BLOOD PRESSURE >= 140 MM HG: ICD-10-PCS | Mod: CPTII,S$GLB,, | Performed by: INTERNAL MEDICINE

## 2022-11-10 PROCEDURE — 3080F DIAST BP >= 90 MM HG: CPT | Mod: CPTII,S$GLB,, | Performed by: INTERNAL MEDICINE

## 2022-11-10 PROCEDURE — 1159F PR MEDICATION LIST DOCUMENTED IN MEDICAL RECORD: ICD-10-PCS | Mod: CPTII,S$GLB,, | Performed by: INTERNAL MEDICINE

## 2022-11-10 PROCEDURE — 63600175 PHARM REV CODE 636 W HCPCS: Mod: JG | Performed by: NURSE PRACTITIONER

## 2022-11-10 PROCEDURE — 63600175 PHARM REV CODE 636 W HCPCS: Mod: JG | Performed by: INTERNAL MEDICINE

## 2022-11-10 PROCEDURE — 99999 PR PBB SHADOW E&M-EST. PATIENT-LVL IV: CPT | Mod: PBBFAC,,, | Performed by: INTERNAL MEDICINE

## 2022-11-10 PROCEDURE — 1160F PR REVIEW ALL MEDS BY PRESCRIBER/CLIN PHARMACIST DOCUMENTED: ICD-10-PCS | Mod: CPTII,S$GLB,, | Performed by: INTERNAL MEDICINE

## 2022-11-10 PROCEDURE — 1160F RVW MEDS BY RX/DR IN RCRD: CPT | Mod: CPTII,S$GLB,, | Performed by: INTERNAL MEDICINE

## 2022-11-10 PROCEDURE — 3080F PR MOST RECENT DIASTOLIC BLOOD PRESSURE >= 90 MM HG: ICD-10-PCS | Mod: CPTII,S$GLB,, | Performed by: INTERNAL MEDICINE

## 2022-11-10 PROCEDURE — 3008F PR BODY MASS INDEX (BMI) DOCUMENTED: ICD-10-PCS | Mod: CPTII,S$GLB,, | Performed by: INTERNAL MEDICINE

## 2022-11-10 PROCEDURE — 3077F SYST BP >= 140 MM HG: CPT | Mod: CPTII,S$GLB,, | Performed by: INTERNAL MEDICINE

## 2022-11-10 PROCEDURE — 96402 CHEMO HORMON ANTINEOPL SQ/IM: CPT

## 2022-11-10 PROCEDURE — 3008F BODY MASS INDEX DOCD: CPT | Mod: CPTII,S$GLB,, | Performed by: INTERNAL MEDICINE

## 2022-11-10 PROCEDURE — 99214 PR OFFICE/OUTPT VISIT, EST, LEVL IV, 30-39 MIN: ICD-10-PCS | Mod: S$GLB,,, | Performed by: INTERNAL MEDICINE

## 2022-11-10 RX ADMIN — DENOSUMAB 60 MG: 60 INJECTION SUBCUTANEOUS at 10:11

## 2022-11-10 RX ADMIN — LEUPROLIDE ACETATE 22.5 MG: KIT at 10:11

## 2022-11-10 NOTE — PROGRESS NOTES
Subjective:       Patient ID: Paz Dunne is a 62 y.o. male.    Chief Complaint: Follow-up (Patient stated no new problems )    Referring Physician:  Dr Marino  PCP:  Dr. Sina Fenton     Diagnosis:  1. Hormone refractory prostate cancer-- c/w biochemical recurrence in Sept 2014, with subsequent suspicious bone mets in right shoulder in March 2017.    MRI findings were equivocal but patient had increased pain and was seen by ortho, with high degree of suspicion for mets.  Underwent palliative radiation.   2.  h/o cT3b [facility] 8 Adenocarcinoma of the prostate dx in 8/13 status post biopsy; Baseline PSA of 56.3-- put on [facility] Clinical trial with androgen deprivation preoperatively followed by radical prostatectomy done March 10, 2014, which showed extracapsular extension, seminal vesicle invasion, no lymph node invasion, no lymphovascular invasion, but with positive surgical margins.     He then completed external beam radiation therapy in August 2014.  3.  Gynecomastia with tender bilateral breast tissue in the subareolar/periareolar region noted by the patient in early June 2015--> intermittent.   4.  'Benign' osteosclerotic lesions read by radiology of CT from 11/11/16 at the left acetabulum, right proximal femur, and right ilium near the SI joint.    Stable on f/u CT/bone scans 2/24/17  5. DEXA on 3/22/19 noted osteopenia of both left/right femoral neck      Current Treatment:   1.  Lupron 22.5 mg IM every 3 months--restarted July 2015.    2.  Xtandi 160 mg p.o. daily started early April 2017  3.  Prolia Q6M started 3/2019     Treatment History:  1.  Preoperative androgen deprivation therapy with LHRH agonist given at [facility] at the time of diagnosis in August 2013.  2.  Radical prostatectomy done March 10, 2014 showing extracapsular extension, seminal vesicle invasion, no lymph node or lymphovascular invasion, and positive surgical margins.  3.  Biochemical recurrence of disease noted September 2014 with  negative radiographic findings--> was restarted on Lupron along with Casodex at that time.  4.  Lupron last given September 2014.  Was discontinued in March 2015 with Casodex continued on.    5.  Casodex started September 2014 discontinued July 2015 secondary to breast tenderness  6. Casodex 50 mg po daily 11/22/16--discontinued after 3 doses secondary to severe shortness of breath  7.  Palliative radiation to the right shoulder ×10 treatments completed from August 21 - September 1, 2017.    HPI:   Patient with a history of locally advanced prostate cancer who underwent definitive surgical resection followed by adjuvant radiation.  He then had a short course of Lupron for biochemical recurrence in September of 2014. In March of 2015, he was having increasing side effects from Lupron, switched to Casodex alone.  Scans in November of 2016 showed no definitive evidence of disease.  Due to rising PSA, he underwent scans in February 2017, the showed stable subcentimeter liver lesions and stable punctate sclerotic lesions in the bony pelvis.  Due to shoulder pain, an MRI of the right shoulder was done on 03/20 4th 2017 that showed altered bone marrow signal involving the glenoid and inferior scapular spine with mild bone marrow edema, review by Dr. Carpenter was thought that this was a metastatic bone lesion.  He was started on Xtandi in April of 2017.  He then received 10 fractions of radiation from 08/21/2017 through 09/01/2017 to the right shoulder.  He has been undergoing routine surveillance including PSAs and scans.  He had an increase in his PSA and a CT scan along with bone scan on 03/11/2022 that showed no evidence of disease.  PSMA scan was done on 7 6th 22, this revealed sclerotic changes in the right scapula with moderate uptake representing known metastatic disease and no other suspicious uptake.      Interval History:   Patient in clinic today for scheduled follow up.  Doing well overall. He continues to  tolerate Xtandi without issue.  He is recovering from his traumatic injury to his left hand.      Past Medical History:   Diagnosis Date    Bone metastases     Dysphagia     Elevated PSA     Hiatal hernia     Hypertension     no meds    Inflammatory bowel disease     diverticulitis    Prostate cancer     Restless leg       Past Surgical History:   Procedure Laterality Date    APPENDECTOMY      CARPAL TUNNEL RELEASE  11/2021    COLONOSCOPY      CYSTOSCOPY      HAND SURGERY Left     laparoscopic hiatal repair with mesh and Toupet fundoplication      open prostatectomy      PENILE PROSTHESIS IMPLANT      PROSTATE SURGERY      positive bx    SHOULDER SURGERY Right 04/27/2021    SKIN CANCER EXCISION      WRIST SURGERY      plate     Social History     Socioeconomic History    Marital status:    Tobacco Use    Smoking status: Former     Years: 4.00     Types: Cigarettes    Smokeless tobacco: Never   Substance and Sexual Activity    Alcohol use: No     Comment: social    Drug use: No    Sexual activity: Yes     Partners: Female      Family History   Problem Relation Age of Onset    Cancer Paternal Grandfather     Cancer Cousin     Cancer Other       Review of patient's allergies indicates:   Allergen Reactions    Clindamycin Anaphylaxis     Other reaction(s): Other (See Comments)  Pt states not an allergy, just cannot take this when taking his chemo medication      Ropinirole Other (See Comments)     Loss of consciousness  Other reaction(s): Faint, Hypotension  Other reaction(s): Other (See Comments)  Hypotension    Tetanus and diphther. tox (pf) Other (See Comments) and Anaphylaxis     hypotension    Tetanus vaccines and toxoid      Other reaction(s): Other (See Comments)  Hypotension    Tetanus toxoid       Review of Systems   Constitutional:  Negative for appetite change and unexpected weight change.   HENT:  Negative for mouth sores.    Eyes:  Negative for visual disturbance.   Respiratory:  Negative for cough  and shortness of breath.    Cardiovascular:  Negative for chest pain.   Gastrointestinal:  Negative for abdominal pain and diarrhea.   Genitourinary:  Negative for frequency.   Musculoskeletal:  Negative for back pain.   Integumentary:  Negative for rash.   Neurological:  Negative for headaches.   Hematological:  Negative for adenopathy.   Psychiatric/Behavioral:  The patient is not nervous/anxious.        Objective:      Physical Exam  Vitals reviewed.   Constitutional:       General: He is awake.      Appearance: Normal appearance.   HENT:      Head: Normocephalic and atraumatic.      Right Ear: Hearing normal.      Left Ear: Hearing normal.      Nose: Nose normal.   Eyes:      General: Lids are normal. Vision grossly intact.      Extraocular Movements: Extraocular movements intact.      Conjunctiva/sclera: Conjunctivae normal.   Cardiovascular:      Rate and Rhythm: Normal rate and regular rhythm.      Pulses: Normal pulses.      Heart sounds: Normal heart sounds.   Pulmonary:      Effort: Pulmonary effort is normal.      Breath sounds: Normal breath sounds. No wheezing, rhonchi or rales.   Abdominal:      General: Bowel sounds are normal.      Palpations: Abdomen is soft.      Tenderness: There is no abdominal tenderness.   Musculoskeletal:      Cervical back: Full passive range of motion without pain.      Right lower leg: No edema.      Left lower leg: No edema.      Comments: Left hand with amputations of his 1st 4 fingers up to the knuckles due to trauma, only some remaining.   Lymphadenopathy:      Cervical: No cervical adenopathy.      Upper Body:      Left upper body: No supraclavicular adenopathy.   Skin:     General: Skin is warm.   Neurological:      General: No focal deficit present.      Mental Status: He is alert and oriented to person, place, and time.   Psychiatric:         Attention and Perception: Attention normal.         Mood and Affect: Mood and affect normal.         Behavior: Behavior is  cooperative.       LABS REVIEWED IN [facility]          Assessment:     1. Hormone refractory prostate cancer-- c/w biochemical recurrence in Sept 2014, with subsequent suspicious bone mets in right shoulder in March 2017.    MRI findings were equivocal but patient had increased pain and was seen by ortho, with high degree of suspicion for mets.  Underwent palliative radiation.   2.  h/o cT3b [facility] 8 Adenocarcinoma of the prostate dx in 8/13 status post biopsy; Baseline PSA of 56.3-- put on [facility] Clinical trial with androgen deprivation preoperatively followed by radical prostatectomy done March 10, 2014, which showed extracapsular extension, seminal vesicle invasion, no lymph node invasion, no lymphovascular invasion, but with positive surgical margins.  He then completed external beam radiation therapy in August 2014.  3.  Gynecomastia with tender bilateral breast tissue in the subareolar/periareolar region noted by the patient in early June 2015--> intermittent.   4.  'Benign' osteosclerotic lesions read by radiology of CT from 11/11/16 at the left acetabulum, right proximal femur, and right ilium near the SI joint.    Stable on f/u CT/bone scans 2/24/17  5. DEXA on 3/22/19 noted osteopenia of both left/right femoral neck       Plan:         Doing well overall from oncological standpoint    PSMA scan showed no evidence of new or worsening disease.    Plan to continue Xtandi 160 mg p.o. daily for now    Continue Lupron every 3 months    Continue Prolia every 6 months; DEXA scan due in 3/2023     RTC in 3 months with labs prior: CBC, CMP, PSA      All questions were answered to the best of my ability and the patient understands the plan moving forward.        Nhan Payne II, MD GUZMAN, Latanya Hernadez LPN, acted solely as a scribe for and in the presence of, Dr. Nhan Payne who performed the service.

## 2022-11-11 DIAGNOSIS — C61 PROSTATE CANCER: ICD-10-CM

## 2022-11-11 DIAGNOSIS — C79.51 BONE METASTASES: ICD-10-CM

## 2022-12-22 DIAGNOSIS — C61 PROSTATE CANCER: ICD-10-CM

## 2022-12-22 DIAGNOSIS — C79.51 BONE METASTASES: ICD-10-CM

## 2022-12-22 RX ORDER — ENZALUTAMIDE 80 MG/1
TABLET ORAL
Qty: 60 TABLET | Refills: 1 | OUTPATIENT
Start: 2022-12-22

## 2023-02-03 ENCOUNTER — LAB VISIT (OUTPATIENT)
Dept: LAB | Facility: HOSPITAL | Age: 63
End: 2023-02-03
Attending: INTERNAL MEDICINE
Payer: COMMERCIAL

## 2023-02-03 DIAGNOSIS — C61 PROSTATE CANCER: ICD-10-CM

## 2023-02-03 DIAGNOSIS — C79.51 BONE METASTASES: ICD-10-CM

## 2023-02-03 LAB
ALBUMIN SERPL-MCNC: 3.4 G/DL (ref 3.4–4.8)
ALBUMIN/GLOB SERPL: 1.1 RATIO (ref 1.1–2)
ALP SERPL-CCNC: 91 UNIT/L (ref 40–150)
ALT SERPL-CCNC: 13 UNIT/L (ref 0–55)
AST SERPL-CCNC: 14 UNIT/L (ref 5–34)
BASOPHILS # BLD AUTO: 0.03 X10(3)/MCL (ref 0–0.2)
BASOPHILS NFR BLD AUTO: 0.7 %
BILIRUBIN DIRECT+TOT PNL SERPL-MCNC: 0.3 MG/DL
BUN SERPL-MCNC: 18.8 MG/DL (ref 8.4–25.7)
CALCIUM SERPL-MCNC: 8.7 MG/DL (ref 8.8–10)
CHLORIDE SERPL-SCNC: 103 MMOL/L (ref 98–107)
CO2 SERPL-SCNC: 28 MMOL/L (ref 23–31)
CREAT SERPL-MCNC: 0.84 MG/DL (ref 0.73–1.18)
EOSINOPHIL # BLD AUTO: 0.26 X10(3)/MCL (ref 0–0.9)
EOSINOPHIL NFR BLD AUTO: 5.7 %
ERYTHROCYTE [DISTWIDTH] IN BLOOD BY AUTOMATED COUNT: 14.2 % (ref 11.5–17)
FREE/TOTAL PSA (OHS): <20.4 %
GFR SERPLBLD CREATININE-BSD FMLA CKD-EPI: >60 MLS/MIN/1.73/M2
GLOBULIN SER-MCNC: 3.1 GM/DL (ref 2.4–3.5)
GLUCOSE SERPL-MCNC: 100 MG/DL (ref 82–115)
HCT VFR BLD AUTO: 42.1 % (ref 42–52)
HGB BLD-MCNC: 12.9 GM/DL (ref 14–18)
IMM GRANULOCYTES # BLD AUTO: 0.02 X10(3)/MCL (ref 0–0.04)
IMM GRANULOCYTES NFR BLD AUTO: 0.4 %
LYMPHOCYTES # BLD AUTO: 0.99 X10(3)/MCL (ref 0.6–4.6)
LYMPHOCYTES NFR BLD AUTO: 21.7 %
MCH RBC QN AUTO: 26.3 PG
MCHC RBC AUTO-ENTMCNC: 30.6 MG/DL (ref 33–36)
MCV RBC AUTO: 85.9 FL (ref 80–94)
MONOCYTES # BLD AUTO: 0.36 X10(3)/MCL (ref 0.1–1.3)
MONOCYTES NFR BLD AUTO: 7.9 %
NEUTROPHILS # BLD AUTO: 2.91 X10(3)/MCL (ref 2.1–9.2)
NEUTROPHILS NFR BLD AUTO: 63.6 %
PLATELET # BLD AUTO: 231 X10(3)/MCL (ref 130–400)
PMV BLD AUTO: 8.4 FL (ref 7.4–10.4)
POTASSIUM SERPL-SCNC: 4.6 MMOL/L (ref 3.5–5.1)
PROT SERPL-MCNC: 6.5 GM/DL (ref 5.8–7.6)
PSA FREE MFR SERPL: <20 %
PSA FREE SERPL-MCNC: <0.1 NG/ML
PSA SERPL-MCNC: 0.49 NG/ML
RBC # BLD AUTO: 4.9 X10(6)/MCL (ref 4.7–6.1)
SODIUM SERPL-SCNC: 139 MMOL/L (ref 136–145)
WBC # SPEC AUTO: 4.6 X10(3)/MCL (ref 4.5–11.5)

## 2023-02-03 PROCEDURE — 84153 ASSAY OF PSA TOTAL: CPT

## 2023-02-03 PROCEDURE — 36415 COLL VENOUS BLD VENIPUNCTURE: CPT

## 2023-02-03 PROCEDURE — 85025 COMPLETE CBC W/AUTO DIFF WBC: CPT

## 2023-02-03 PROCEDURE — 80053 COMPREHEN METABOLIC PANEL: CPT

## 2023-02-09 ENCOUNTER — OFFICE VISIT (OUTPATIENT)
Dept: HEMATOLOGY/ONCOLOGY | Facility: CLINIC | Age: 63
End: 2023-02-09
Payer: COMMERCIAL

## 2023-02-09 ENCOUNTER — INFUSION (OUTPATIENT)
Dept: INFUSION THERAPY | Facility: HOSPITAL | Age: 63
End: 2023-02-09
Attending: FAMILY MEDICINE
Payer: COMMERCIAL

## 2023-02-09 VITALS
SYSTOLIC BLOOD PRESSURE: 149 MMHG | BODY MASS INDEX: 36.83 KG/M2 | TEMPERATURE: 99 F | SYSTOLIC BLOOD PRESSURE: 123 MMHG | DIASTOLIC BLOOD PRESSURE: 89 MMHG | RESPIRATION RATE: 18 BRPM | HEART RATE: 77 BPM | HEIGHT: 69 IN | DIASTOLIC BLOOD PRESSURE: 114 MMHG | WEIGHT: 248.69 LBS | OXYGEN SATURATION: 97 %

## 2023-02-09 DIAGNOSIS — C61 PROSTATE CANCER: Primary | ICD-10-CM

## 2023-02-09 DIAGNOSIS — C79.51 MALIGNANT NEOPLASM METASTATIC TO BONE: ICD-10-CM

## 2023-02-09 DIAGNOSIS — Z79.818 ENCOUNTER FOR MONITORING ANDROGEN DEPRIVATION THERAPY: ICD-10-CM

## 2023-02-09 PROCEDURE — 1159F MED LIST DOCD IN RCRD: CPT | Mod: CPTII,S$GLB,, | Performed by: NURSE PRACTITIONER

## 2023-02-09 PROCEDURE — 1160F PR REVIEW ALL MEDS BY PRESCRIBER/CLIN PHARMACIST DOCUMENTED: ICD-10-PCS | Mod: CPTII,S$GLB,, | Performed by: NURSE PRACTITIONER

## 2023-02-09 PROCEDURE — 99214 OFFICE O/P EST MOD 30 MIN: CPT | Mod: S$GLB,,, | Performed by: NURSE PRACTITIONER

## 2023-02-09 PROCEDURE — 3008F BODY MASS INDEX DOCD: CPT | Mod: CPTII,S$GLB,, | Performed by: NURSE PRACTITIONER

## 2023-02-09 PROCEDURE — 3008F PR BODY MASS INDEX (BMI) DOCUMENTED: ICD-10-PCS | Mod: CPTII,S$GLB,, | Performed by: NURSE PRACTITIONER

## 2023-02-09 PROCEDURE — 99999 PR PBB SHADOW E&M-EST. PATIENT-LVL V: ICD-10-PCS | Mod: PBBFAC,,, | Performed by: NURSE PRACTITIONER

## 2023-02-09 PROCEDURE — 99214 PR OFFICE/OUTPT VISIT, EST, LEVL IV, 30-39 MIN: ICD-10-PCS | Mod: S$GLB,,, | Performed by: NURSE PRACTITIONER

## 2023-02-09 PROCEDURE — 96402 CHEMO HORMON ANTINEOPL SQ/IM: CPT

## 2023-02-09 PROCEDURE — 1159F PR MEDICATION LIST DOCUMENTED IN MEDICAL RECORD: ICD-10-PCS | Mod: CPTII,S$GLB,, | Performed by: NURSE PRACTITIONER

## 2023-02-09 PROCEDURE — 99999 PR PBB SHADOW E&M-EST. PATIENT-LVL V: CPT | Mod: PBBFAC,,, | Performed by: NURSE PRACTITIONER

## 2023-02-09 PROCEDURE — 3077F SYST BP >= 140 MM HG: CPT | Mod: CPTII,S$GLB,, | Performed by: NURSE PRACTITIONER

## 2023-02-09 PROCEDURE — 3080F PR MOST RECENT DIASTOLIC BLOOD PRESSURE >= 90 MM HG: ICD-10-PCS | Mod: CPTII,S$GLB,, | Performed by: NURSE PRACTITIONER

## 2023-02-09 PROCEDURE — 63600175 PHARM REV CODE 636 W HCPCS: Mod: JG | Performed by: NURSE PRACTITIONER

## 2023-02-09 PROCEDURE — 3080F DIAST BP >= 90 MM HG: CPT | Mod: CPTII,S$GLB,, | Performed by: NURSE PRACTITIONER

## 2023-02-09 PROCEDURE — 3077F PR MOST RECENT SYSTOLIC BLOOD PRESSURE >= 140 MM HG: ICD-10-PCS | Mod: CPTII,S$GLB,, | Performed by: NURSE PRACTITIONER

## 2023-02-09 PROCEDURE — 1160F RVW MEDS BY RX/DR IN RCRD: CPT | Mod: CPTII,S$GLB,, | Performed by: NURSE PRACTITIONER

## 2023-02-09 RX ADMIN — LEUPROLIDE ACETATE 22.5 MG: KIT at 09:02

## 2023-02-09 NOTE — PROGRESS NOTES
Subjective:       Patient ID: Paz Dunne is a 62 y.o. male.    Chief Complaint: Follow-up (Patient stated no new problems )      Referring Physician:  Dr Marino  PCP:  Dr. Sina Fenton     Diagnosis:  1. Hormone refractory prostate cancer-- c/w biochemical recurrence in Sept 2014, with subsequent suspicious bone mets in right shoulder in March 2017.    MRI findings were equivocal but patient had increased pain and was seen by ortho, with high degree of suspicion for mets.  Underwent palliative radiation.   2.  h/o cT3b [facility] 8 Adenocarcinoma of the prostate dx in 8/13 status post biopsy; Baseline PSA of 56.3-- put on [facility] Clinical trial with androgen deprivation preoperatively followed by radical prostatectomy done March 10, 2014, which showed extracapsular extension, seminal vesicle invasion, no lymph node invasion, no lymphovascular invasion, but with positive surgical margins.     He then completed external beam radiation therapy in August 2014.  3.  Gynecomastia with tender bilateral breast tissue in the subareolar/periareolar region noted by the patient in early June 2015--> intermittent.   4.  'Benign' osteosclerotic lesions read by radiology of CT from 11/11/16 at the left acetabulum, right proximal femur, and right ilium near the SI joint.    Stable on f/u CT/bone scans 2/24/17  5. DEXA on 3/22/19 noted osteopenia of both left/right femoral neck      Current Treatment:   1.  Lupron 22.5 mg IM every 3 months--restarted July 2015.    2.  Xtandi 160 mg p.o. daily started early April 2017  3.  Prolia Q6M started 3/2019     Treatment History:  1.  Preoperative androgen deprivation therapy with LHRH agonist given at [facility] at the time of diagnosis in August 2013.  2.  Radical prostatectomy done March 10, 2014 showing extracapsular extension, seminal vesicle invasion, no lymph node or lymphovascular invasion, and positive surgical margins.  3.  Biochemical recurrence of disease noted September 2014  with negative radiographic findings--> was restarted on Lupron along with Casodex at that time.  4.  Lupron last given September 2014.  Was discontinued in March 2015 with Casodex continued on.    5.  Casodex started September 2014 discontinued July 2015 secondary to breast tenderness  6. Casodex 50 mg po daily 11/22/16--discontinued after 3 doses secondary to severe shortness of breath  7.  Palliative radiation to the right shoulder ×10 treatments completed from August 21 - September 1, 2017.    HPI:   Patient with a history of locally advanced prostate cancer who underwent definitive surgical resection followed by adjuvant radiation.  He then had a short course of Lupron for biochemical recurrence in September of 2014. In March of 2015, he was having increasing side effects from Lupron, switched to Casodex alone.  Scans in November of 2016 showed no definitive evidence of disease.  Due to rising PSA, he underwent scans in February 2017, the showed stable subcentimeter liver lesions and stable punctate sclerotic lesions in the bony pelvis.  Due to shoulder pain, an MRI of the right shoulder was done on 03/20 4th 2017 that showed altered bone marrow signal involving the glenoid and inferior scapular spine with mild bone marrow edema, review by Dr. Carpenter was thought that this was a metastatic bone lesion.  He was started on Xtandi in April of 2017.  He then received 10 fractions of radiation from 08/21/2017 through 09/01/2017 to the right shoulder.  He has been undergoing routine surveillance including PSAs and scans.  He had an increase in his PSA and a CT scan along with bone scan on 03/11/2022 that showed no evidence of disease.  PSMA scan was done on 7 6th 22, this revealed sclerotic changes in the right scapula with moderate uptake representing known metastatic disease and no other suspicious uptake.      Interval History:   Patient in clinic today for scheduled follow up.  Doing well overall. He continues to  tolerate Xtandi without issue.  Is any pain, bowel changes, nausea, shortness of breath.      Past Medical History:   Diagnosis Date    Bone metastases     Dysphagia     Elevated PSA     Hiatal hernia     Hypertension     no meds    Inflammatory bowel disease     diverticulitis    Prostate cancer     Restless leg       Past Surgical History:   Procedure Laterality Date    APPENDECTOMY      CARPAL TUNNEL RELEASE  11/2021    COLONOSCOPY      CYSTOSCOPY      HAND SURGERY Left     laparoscopic hiatal repair with mesh and Toupet fundoplication      open prostatectomy      PENILE PROSTHESIS IMPLANT      PROSTATE SURGERY      positive bx    SHOULDER SURGERY Right 04/27/2021    SKIN CANCER EXCISION      WRIST SURGERY      plate     Social History     Socioeconomic History    Marital status:    Tobacco Use    Smoking status: Former     Years: 4.00     Types: Cigarettes    Smokeless tobacco: Never   Substance and Sexual Activity    Alcohol use: No     Comment: social    Drug use: No    Sexual activity: Yes     Partners: Female      Family History   Problem Relation Age of Onset    Cancer Paternal Grandfather     Cancer Cousin     Cancer Other       Review of patient's allergies indicates:   Allergen Reactions    Clindamycin Anaphylaxis     Other reaction(s): Other (See Comments)  Pt states not an allergy, just cannot take this when taking his chemo medication      Ropinirole Other (See Comments)     Loss of consciousness  Other reaction(s): Faint, Hypotension  Other reaction(s): Other (See Comments)  Hypotension    Tetanus and diphther. tox (pf) Other (See Comments) and Anaphylaxis     hypotension    Tetanus vaccines and toxoid      Other reaction(s): Other (See Comments)  Hypotension    Tetanus toxoid       Review of Systems   Constitutional:  Negative for appetite change and unexpected weight change.   HENT:  Negative for mouth sores.    Eyes:  Negative for visual disturbance.   Respiratory:  Negative for cough and  shortness of breath.    Cardiovascular:  Negative for chest pain.   Gastrointestinal:  Negative for abdominal pain and diarrhea.   Genitourinary:  Negative for frequency.   Musculoskeletal:  Negative for back pain.   Integumentary:  Negative for rash.   Neurological:  Negative for headaches.   Hematological:  Negative for adenopathy.   Psychiatric/Behavioral:  The patient is not nervous/anxious.        Objective:      Physical Exam  Vitals reviewed.   Constitutional:       General: He is awake.      Appearance: Normal appearance.   HENT:      Head: Normocephalic and atraumatic.      Right Ear: Hearing normal.      Left Ear: Hearing normal.      Nose: Nose normal.   Eyes:      General: Lids are normal. Vision grossly intact.      Extraocular Movements: Extraocular movements intact.      Conjunctiva/sclera: Conjunctivae normal.   Cardiovascular:      Rate and Rhythm: Normal rate and regular rhythm.      Pulses: Normal pulses.      Heart sounds: Normal heart sounds.   Pulmonary:      Effort: Pulmonary effort is normal.      Breath sounds: Normal breath sounds. No wheezing, rhonchi or rales.   Abdominal:      General: Bowel sounds are normal.      Palpations: Abdomen is soft.      Tenderness: There is no abdominal tenderness.   Musculoskeletal:      Cervical back: Full passive range of motion without pain.      Right lower leg: No edema.      Left lower leg: No edema.      Comments: Left hand with amputations of his 1st 4 fingers up to the knuckles due to trauma, only some remaining.   Lymphadenopathy:      Cervical: No cervical adenopathy.      Upper Body:      Left upper body: No supraclavicular adenopathy.   Skin:     General: Skin is warm.   Neurological:      General: No focal deficit present.      Mental Status: He is alert and oriented to person, place, and time.   Psychiatric:         Attention and Perception: Attention normal.         Mood and Affect: Mood and affect normal.         Behavior: Behavior is  cooperative.       LABS REVIEWED IN [facility]          Assessment:     1. Hormone refractory prostate cancer-- c/w biochemical recurrence in Sept 2014, with subsequent suspicious bone mets in right shoulder in March 2017.    MRI findings were equivocal but patient had increased pain and was seen by ortho, with high degree of suspicion for mets.  Underwent palliative radiation.   2.  h/o cT3b [facility] 8 Adenocarcinoma of the prostate dx in 8/13 status post biopsy; Baseline PSA of 56.3-- put on [facility] Clinical trial with androgen deprivation preoperatively followed by radical prostatectomy done March 10, 2014, which showed extracapsular extension, seminal vesicle invasion, no lymph node invasion, no lymphovascular invasion, but with positive surgical margins.  He then completed external beam radiation therapy in August 2014.  3.  Gynecomastia with tender bilateral breast tissue in the subareolar/periareolar region noted by the patient in early June 2015--> intermittent.   4.  'Benign' osteosclerotic lesions read by radiology of CT from 11/11/16 at the left acetabulum, right proximal femur, and right ilium near the SI joint.    Stable on f/u CT/bone scans 2/24/17  5. DEXA on 3/22/19 noted osteopenia of both left/right femoral neck       Plan:         Doing well overall from oncological standpoint    PSMA scan showed no evidence of new or worsening disease.    Plan to continue Xtandi 160 mg p.o. daily for now    Continue Lupron every 3 months    Continue Prolia every 6 months; DEXA scan due in 3/2023     RTC in 3 months with labs prior: CBC, CMP, PSA      All questions were answered to the best of my ability and the patient understands the plan moving forward.        Vaishali Waite, TIESHAP-C

## 2023-02-13 ENCOUNTER — TELEPHONE (OUTPATIENT)
Dept: HEMATOLOGY/ONCOLOGY | Facility: CLINIC | Age: 63
End: 2023-02-13
Payer: COMMERCIAL

## 2023-02-13 DIAGNOSIS — C61 PROSTATE CANCER: ICD-10-CM

## 2023-02-13 DIAGNOSIS — C79.51 BONE METASTASES: ICD-10-CM

## 2023-02-13 NOTE — TELEPHONE ENCOUNTER
Returned Rosalia's call from [facility]. She is needing a signed prescription faxed to them at [phone] to approve Xtandi for a 90 day supply. Routed rx to be printed and signed

## 2023-05-04 ENCOUNTER — LAB VISIT (OUTPATIENT)
Dept: LAB | Facility: HOSPITAL | Age: 63
End: 2023-05-04
Attending: FAMILY MEDICINE
Payer: COMMERCIAL

## 2023-05-04 DIAGNOSIS — Z79.818 ENCOUNTER FOR MONITORING ANDROGEN DEPRIVATION THERAPY: ICD-10-CM

## 2023-05-04 DIAGNOSIS — C61 PROSTATE CANCER: ICD-10-CM

## 2023-05-04 DIAGNOSIS — C79.51 MALIGNANT NEOPLASM METASTATIC TO BONE: ICD-10-CM

## 2023-05-04 LAB
ALBUMIN SERPL-MCNC: 3.8 G/DL (ref 3.4–4.8)
ALBUMIN/GLOB SERPL: 1.3 RATIO (ref 1.1–2)
ALP SERPL-CCNC: 101 UNIT/L (ref 40–150)
ALT SERPL-CCNC: 14 UNIT/L (ref 0–55)
AST SERPL-CCNC: 17 UNIT/L (ref 5–34)
BASOPHILS # BLD AUTO: 0.02 X10(3)/MCL
BASOPHILS NFR BLD AUTO: 0.4 %
BILIRUBIN DIRECT+TOT PNL SERPL-MCNC: 0.4 MG/DL
BUN SERPL-MCNC: 18.6 MG/DL (ref 8.4–25.7)
CALCIUM SERPL-MCNC: 9.7 MG/DL (ref 8.8–10)
CHLORIDE SERPL-SCNC: 104 MMOL/L (ref 98–107)
CO2 SERPL-SCNC: 31 MMOL/L (ref 23–31)
CREAT SERPL-MCNC: 1 MG/DL (ref 0.73–1.18)
EOSINOPHIL # BLD AUTO: 0.15 X10(3)/MCL (ref 0–0.9)
EOSINOPHIL NFR BLD AUTO: 2.9 %
ERYTHROCYTE [DISTWIDTH] IN BLOOD BY AUTOMATED COUNT: 11.9 % (ref 11.5–17)
GFR SERPLBLD CREATININE-BSD FMLA CKD-EPI: >60 MLS/MIN/1.73/M2
GLOBULIN SER-MCNC: 3 GM/DL (ref 2.4–3.5)
GLUCOSE SERPL-MCNC: 87 MG/DL (ref 82–115)
HCT VFR BLD AUTO: 44.5 % (ref 42–52)
HGB BLD-MCNC: 13.8 G/DL (ref 14–18)
IMM GRANULOCYTES # BLD AUTO: 0.01 X10(3)/MCL (ref 0–0.04)
IMM GRANULOCYTES NFR BLD AUTO: 0.2 %
LYMPHOCYTES # BLD AUTO: 1.25 X10(3)/MCL (ref 0.6–4.6)
LYMPHOCYTES NFR BLD AUTO: 23.9 %
MCH RBC QN AUTO: 28.2 PG (ref 27–31)
MCHC RBC AUTO-ENTMCNC: 31 G/DL (ref 33–36)
MCV RBC AUTO: 90.8 FL (ref 80–94)
MONOCYTES # BLD AUTO: 0.39 X10(3)/MCL (ref 0.1–1.3)
MONOCYTES NFR BLD AUTO: 7.5 %
NEUTROPHILS # BLD AUTO: 3.4 X10(3)/MCL (ref 2.1–9.2)
NEUTROPHILS NFR BLD AUTO: 65.1 %
PLATELET # BLD AUTO: 238 X10(3)/MCL (ref 130–400)
PMV BLD AUTO: 8.9 FL (ref 7.4–10.4)
POTASSIUM SERPL-SCNC: 4.5 MMOL/L (ref 3.5–5.1)
PROT SERPL-MCNC: 6.8 GM/DL (ref 5.8–7.6)
PSA SERPL-MCNC: 1.5 NG/ML
RBC # BLD AUTO: 4.9 X10(6)/MCL (ref 4.7–6.1)
SODIUM SERPL-SCNC: 140 MMOL/L (ref 136–145)
WBC # SPEC AUTO: 5.22 X10(3)/MCL (ref 4.5–11.5)

## 2023-05-04 PROCEDURE — 85025 COMPLETE CBC W/AUTO DIFF WBC: CPT

## 2023-05-04 PROCEDURE — 80053 COMPREHEN METABOLIC PANEL: CPT

## 2023-05-04 PROCEDURE — 36415 COLL VENOUS BLD VENIPUNCTURE: CPT

## 2023-05-04 PROCEDURE — 84153 ASSAY OF PSA TOTAL: CPT

## 2023-05-11 ENCOUNTER — INFUSION (OUTPATIENT)
Dept: INFUSION THERAPY | Facility: HOSPITAL | Age: 63
End: 2023-05-11
Attending: FAMILY MEDICINE
Payer: COMMERCIAL

## 2023-05-11 ENCOUNTER — OFFICE VISIT (OUTPATIENT)
Dept: HEMATOLOGY/ONCOLOGY | Facility: CLINIC | Age: 63
End: 2023-05-11
Payer: COMMERCIAL

## 2023-05-11 VITALS
BODY MASS INDEX: 36.51 KG/M2 | TEMPERATURE: 98 F | HEART RATE: 76 BPM | WEIGHT: 247.19 LBS | SYSTOLIC BLOOD PRESSURE: 128 MMHG | OXYGEN SATURATION: 95 % | RESPIRATION RATE: 18 BRPM | DIASTOLIC BLOOD PRESSURE: 77 MMHG

## 2023-05-11 DIAGNOSIS — R11.0 NAUSEA: ICD-10-CM

## 2023-05-11 DIAGNOSIS — C61 PROSTATE CANCER METASTATIC TO BONE: Primary | ICD-10-CM

## 2023-05-11 DIAGNOSIS — Z79.818 ENCOUNTER FOR MONITORING ANDROGEN DEPRIVATION THERAPY: ICD-10-CM

## 2023-05-11 DIAGNOSIS — C61 PROSTATE CANCER METASTATIC TO BONE: ICD-10-CM

## 2023-05-11 DIAGNOSIS — C79.51 MALIGNANT NEOPLASM METASTATIC TO BONE: ICD-10-CM

## 2023-05-11 DIAGNOSIS — C61 MALIGNANT NEOPLASM OF PROSTATE: ICD-10-CM

## 2023-05-11 DIAGNOSIS — C61 PROSTATE CANCER: Primary | ICD-10-CM

## 2023-05-11 DIAGNOSIS — C79.51 PROSTATE CANCER METASTATIC TO BONE: ICD-10-CM

## 2023-05-11 DIAGNOSIS — R97.21 RISING PSA FOLLOWING TREATMENT FOR MALIGNANT NEOPLASM OF PROSTATE: ICD-10-CM

## 2023-05-11 DIAGNOSIS — C79.51 PROSTATE CANCER METASTATIC TO BONE: Primary | ICD-10-CM

## 2023-05-11 DIAGNOSIS — M89.9 DISORDER OF BONE: ICD-10-CM

## 2023-05-11 PROCEDURE — 1160F RVW MEDS BY RX/DR IN RCRD: CPT | Mod: CPTII,S$GLB,, | Performed by: NURSE PRACTITIONER

## 2023-05-11 PROCEDURE — 3078F PR MOST RECENT DIASTOLIC BLOOD PRESSURE < 80 MM HG: ICD-10-PCS | Mod: CPTII,S$GLB,, | Performed by: NURSE PRACTITIONER

## 2023-05-11 PROCEDURE — 3078F DIAST BP <80 MM HG: CPT | Mod: CPTII,S$GLB,, | Performed by: NURSE PRACTITIONER

## 2023-05-11 PROCEDURE — 99215 PR OFFICE/OUTPT VISIT, EST, LEVL V, 40-54 MIN: ICD-10-PCS | Mod: S$GLB,,, | Performed by: NURSE PRACTITIONER

## 2023-05-11 PROCEDURE — 63600175 PHARM REV CODE 636 W HCPCS: Mod: JZ,JG | Performed by: NURSE PRACTITIONER

## 2023-05-11 PROCEDURE — 96402 CHEMO HORMON ANTINEOPL SQ/IM: CPT

## 2023-05-11 PROCEDURE — 1159F PR MEDICATION LIST DOCUMENTED IN MEDICAL RECORD: ICD-10-PCS | Mod: CPTII,S$GLB,, | Performed by: NURSE PRACTITIONER

## 2023-05-11 PROCEDURE — 1160F PR REVIEW ALL MEDS BY PRESCRIBER/CLIN PHARMACIST DOCUMENTED: ICD-10-PCS | Mod: CPTII,S$GLB,, | Performed by: NURSE PRACTITIONER

## 2023-05-11 PROCEDURE — 3074F SYST BP LT 130 MM HG: CPT | Mod: CPTII,S$GLB,, | Performed by: NURSE PRACTITIONER

## 2023-05-11 PROCEDURE — 3008F PR BODY MASS INDEX (BMI) DOCUMENTED: ICD-10-PCS | Mod: CPTII,S$GLB,, | Performed by: NURSE PRACTITIONER

## 2023-05-11 PROCEDURE — 99999 PR PBB SHADOW E&M-EST. PATIENT-LVL V: ICD-10-PCS | Mod: PBBFAC,,, | Performed by: NURSE PRACTITIONER

## 2023-05-11 PROCEDURE — 99215 OFFICE O/P EST HI 40 MIN: CPT | Mod: S$GLB,,, | Performed by: NURSE PRACTITIONER

## 2023-05-11 PROCEDURE — 4010F PR ACE/ARB THEARPY RXD/TAKEN: ICD-10-PCS | Mod: CPTII,S$GLB,, | Performed by: NURSE PRACTITIONER

## 2023-05-11 PROCEDURE — 3008F BODY MASS INDEX DOCD: CPT | Mod: CPTII,S$GLB,, | Performed by: NURSE PRACTITIONER

## 2023-05-11 PROCEDURE — 1159F MED LIST DOCD IN RCRD: CPT | Mod: CPTII,S$GLB,, | Performed by: NURSE PRACTITIONER

## 2023-05-11 PROCEDURE — 3074F PR MOST RECENT SYSTOLIC BLOOD PRESSURE < 130 MM HG: ICD-10-PCS | Mod: CPTII,S$GLB,, | Performed by: NURSE PRACTITIONER

## 2023-05-11 PROCEDURE — 99999 PR PBB SHADOW E&M-EST. PATIENT-LVL V: CPT | Mod: PBBFAC,,, | Performed by: NURSE PRACTITIONER

## 2023-05-11 PROCEDURE — 4010F ACE/ARB THERAPY RXD/TAKEN: CPT | Mod: CPTII,S$GLB,, | Performed by: NURSE PRACTITIONER

## 2023-05-11 PROCEDURE — 96372 THER/PROPH/DIAG INJ SC/IM: CPT | Mod: 59

## 2023-05-11 RX ORDER — OLMESARTAN MEDOXOMIL 20 MG/1
20 TABLET ORAL
COMMUNITY
Start: 2023-04-19

## 2023-05-11 RX ORDER — ONDANSETRON HYDROCHLORIDE 8 MG/1
8 TABLET, FILM COATED ORAL EVERY 8 HOURS PRN
Qty: 30 TABLET | Refills: 1 | Status: SHIPPED | OUTPATIENT
Start: 2023-05-11

## 2023-05-11 RX ADMIN — LEUPROLIDE ACETATE 22.5 MG: KIT at 09:05

## 2023-05-11 RX ADMIN — DENOSUMAB 60 MG: 60 INJECTION SUBCUTANEOUS at 09:05

## 2023-05-11 NOTE — PROGRESS NOTES
Subjective:       Patient ID: Paz Dunne is a 62 y.o. male.    Chief Complaint: Follow-up (Pt would like to discuss his PSA)      Referring Physician:  Dr Marino  PCP:  Dr. Sina Fenton     Diagnosis:  1. Hormone refractory prostate cancer-- c/w biochemical recurrence in Sept 2014, with subsequent suspicious bone mets in right shoulder in March 2017.    MRI findings were equivocal but patient had increased pain and was seen by ortho, with high degree of suspicion for mets.  Underwent palliative radiation.   2.  h/o cT3b [place] 8 Adenocarcinoma of the prostate dx in 8/13 status post biopsy; Baseline PSA of 56.3-- put on [facility] Clinical trial with androgen deprivation preoperatively followed by radical prostatectomy done March 10, 2014, which showed extracapsular extension, seminal vesicle invasion, no lymph node invasion, no lymphovascular invasion, but with positive surgical margins.     He then completed external beam radiation therapy in August 2014.  3.  Gynecomastia with tender bilateral breast tissue in the subareolar/periareolar region noted by the patient in early June 2015--> intermittent.   4.  'Benign' osteosclerotic lesions read by radiology of CT from 11/11/16 at the left acetabulum, right proximal femur, and right ilium near the SI joint.    Stable on f/u CT/bone scans 2/24/17  5. DEXA on 3/22/19 noted osteopenia of both left/right femoral neck      Current Treatment:   1.  Lupron 22.5 mg IM every 3 months--restarted July 2015.    2.  Xtandi 160 mg p.o. daily started early April 2017  3.  Prolia Q6M started 3/2019     Treatment History:  1.  Preoperative androgen deprivation therapy with LHRH agonist given at [facility] at the time of diagnosis in August 2013.  2.  Radical prostatectomy done March 10, 2014 showing extracapsular extension, seminal vesicle invasion, no lymph node or lymphovascular invasion, and positive surgical margins.  3.  Biochemical recurrence of disease noted September 2014  with negative radiographic findings--> was restarted on Lupron along with Casodex at that time.  4.  Lupron last given September 2014.  Was discontinued in March 2015 with Casodex continued on.    5.  Casodex started September 2014 discontinued July 2015 secondary to breast tenderness  6. Casodex 50 mg po daily 11/22/16--discontinued after 3 doses secondary to severe shortness of breath  7.  Palliative radiation to the right shoulder ×10 treatments completed from August 21 - September 1, 2017.    HPI:   Patient with a history of locally advanced prostate cancer who underwent definitive surgical resection followed by adjuvant radiation.  He then had a short course of Lupron for biochemical recurrence in September of 2014. In March of 2015, he was having increasing side effects from Lupron, switched to Casodex alone.  Scans in November of 2016 showed no definitive evidence of disease.  Due to rising PSA, he underwent scans in February 2017, the showed stable subcentimeter liver lesions and stable punctate sclerotic lesions in the bony pelvis.  Due to shoulder pain, an MRI of the right shoulder was done on 03/20 4th 2017 that showed altered bone marrow signal involving the glenoid and inferior scapular spine with mild bone marrow edema, review by Dr. Carpenter was thought that this was a metastatic bone lesion.  He was started on Xtandi in April of 2017.  He then received 10 fractions of radiation from 08/21/2017 through 09/01/2017 to the right shoulder.  He has been undergoing routine surveillance including PSAs and scans.  He had an increase in his PSA and a CT scan along with bone scan on 03/11/2022 that showed no evidence of disease.  PSMA scan was done on 7 6th 22, this revealed sclerotic changes in the right scapula with moderate uptake representing known metastatic disease and no other suspicious uptake.      Interval History:   Patient in clinic today for scheduled follow up for metastatic prostate cancer,  accompanied by his wife.  Doing well overall. He continues to tolerate Xtandi.  He does have some occasional nausea and some decreased appetite.  He denies any new areas of pain other than some mild occasional abdominal pain lately.  He denies any blood in his stool or black stools.  No problems urinating.      Past Medical History:   Diagnosis Date    Bone metastases     Dysphagia     Elevated PSA     Hiatal hernia     Hypertension     no meds    Inflammatory bowel disease     diverticulitis    Prostate cancer     Restless leg       Past Surgical History:   Procedure Laterality Date    APPENDECTOMY      CARPAL TUNNEL RELEASE  11/2021    COLONOSCOPY      CYSTOSCOPY      HAND SURGERY Left     laparoscopic hiatal repair with mesh and Toupet fundoplication      open prostatectomy      PENILE PROSTHESIS IMPLANT      PROSTATE SURGERY      positive bx    SHOULDER SURGERY Right 04/27/2021    SKIN CANCER EXCISION      WRIST SURGERY      plate     Social History     Socioeconomic History    Marital status:    Tobacco Use    Smoking status: Former     Years: 4.00     Types: Cigarettes    Smokeless tobacco: Never   Substance and Sexual Activity    Alcohol use: No     Comment: social    Drug use: No    Sexual activity: Yes     Partners: Female      Family History   Problem Relation Age of Onset    Cancer Paternal Grandfather     Cancer Cousin     Cancer Other       Review of patient's allergies indicates:   Allergen Reactions    Clindamycin Anaphylaxis     Other reaction(s): Other (See Comments)  Pt states not an allergy, just cannot take this when taking his chemo medication      Ropinirole Other (See Comments)     Loss of consciousness  Other reaction(s): Faint, Hypotension  Other reaction(s): Other (See Comments)  Hypotension    Tetanus and diphther. tox (pf) Other (See Comments) and Anaphylaxis     hypotension    Tetanus vaccines and toxoid      Other reaction(s): Other (See Comments)  Hypotension    Tetanus toxoid        Review of Systems   Constitutional:  Negative for appetite change and unexpected weight change.   HENT:  Negative for mouth sores.    Eyes:  Negative for visual disturbance.   Respiratory:  Negative for cough and shortness of breath.    Cardiovascular:  Negative for chest pain.   Gastrointestinal:  Negative for abdominal pain and diarrhea.   Genitourinary:  Negative for frequency.   Musculoskeletal:  Negative for back pain.   Integumentary:  Negative for rash.   Neurological:  Negative for headaches.   Hematological:  Negative for adenopathy.   Psychiatric/Behavioral:  The patient is not nervous/anxious.        Objective:      Physical Exam  Vitals reviewed.   Constitutional:       General: He is awake.      Appearance: Normal appearance.   HENT:      Head: Normocephalic and atraumatic.      Right Ear: Hearing normal.      Left Ear: Hearing normal.      Nose: Nose normal.   Eyes:      General: Lids are normal. Vision grossly intact.      Extraocular Movements: Extraocular movements intact.      Conjunctiva/sclera: Conjunctivae normal.   Cardiovascular:      Rate and Rhythm: Normal rate and regular rhythm.      Pulses: Normal pulses.      Heart sounds: Normal heart sounds.   Pulmonary:      Effort: Pulmonary effort is normal.      Breath sounds: Normal breath sounds. No wheezing, rhonchi or rales.   Abdominal:      General: Bowel sounds are normal.      Palpations: Abdomen is soft.      Tenderness: There is no abdominal tenderness.   Musculoskeletal:      Cervical back: Full passive range of motion without pain.      Right lower leg: No edema.      Left lower leg: No edema.      Comments: Left hand with amputations of his 1st 4 fingers up to the knuckles due to trauma, only thumb remaining   Skin:     General: Skin is warm.   Neurological:      General: No focal deficit present.      Mental Status: He is alert and oriented to person, place, and time.   Psychiatric:         Attention and Perception: Attention  normal.         Mood and Affect: Mood and affect normal.         Behavior: Behavior is cooperative.       LABS REVIEWED IN [facility]          Assessment:     1. Hormone refractory prostate cancer-- c/w biochemical recurrence in Sept 2014, with subsequent suspicious bone mets in right shoulder in March 2017.    MRI findings were equivocal but patient had increased pain and was seen by ortho, with high degree of suspicion for mets.  Underwent palliative radiation.   2.  h/o cT3b [place] 8 Adenocarcinoma of the prostate dx in 8/13 status post biopsy; Baseline PSA of 56.3-- put on [facility] Clinical trial with androgen deprivation preoperatively followed by radical prostatectomy done March 10, 2014, which showed extracapsular extension, seminal vesicle invasion, no lymph node invasion, no lymphovascular invasion, but with positive surgical margins.  He then completed external beam radiation therapy in August 2014.  3.  Gynecomastia with tender bilateral breast tissue in the subareolar/periareolar region noted by the patient in early June 2015--> intermittent.   4.  'Benign' osteosclerotic lesions read by radiology of CT from 11/11/16 at the left acetabulum, right proximal femur, and right ilium near the SI joint.    Stable on f/u CT/bone scans 2/24/17  5. DEXA on 3/22/19 noted osteopenia of both left/right femoral neck       Plan:             PSMA scan showed no evidence of new or worsening disease.    Plan to continue Xtandi 160 mg p.o. daily for now    Continue Lupron every 3 months    Continue Prolia every 6 months; DEXA done 02/20/2023 showing osteopenia.    PSA is rising, we will get PSMA PET      RTC in 4 weeks with labs prior: CBC, CMP, PSA      All questions were answered to the best of my ability and the patient understands the plan moving forward.        Vaishali Waite, SHERWIN

## 2023-06-02 ENCOUNTER — HOSPITAL ENCOUNTER (OUTPATIENT)
Dept: RADIOLOGY | Facility: HOSPITAL | Age: 63
Discharge: HOME | End: 2023-06-02
Attending: NURSE PRACTITIONER
Payer: COMMERCIAL

## 2023-06-02 PROCEDURE — 78815 PET IMAGE W/CT SKULL-THIGH: CPT | Mod: TC

## 2023-06-08 ENCOUNTER — OFFICE VISIT (OUTPATIENT)
Dept: HEMATOLOGY/ONCOLOGY | Facility: CLINIC | Age: 63
End: 2023-06-08
Payer: COMMERCIAL

## 2023-06-08 ENCOUNTER — CLINICAL SUPPORT (OUTPATIENT)
Dept: HEMATOLOGY/ONCOLOGY | Facility: CLINIC | Age: 63
End: 2023-06-08
Payer: COMMERCIAL

## 2023-06-08 VITALS
WEIGHT: 241.81 LBS | SYSTOLIC BLOOD PRESSURE: 126 MMHG | BODY MASS INDEX: 36.65 KG/M2 | TEMPERATURE: 98 F | OXYGEN SATURATION: 97 % | RESPIRATION RATE: 14 BRPM | DIASTOLIC BLOOD PRESSURE: 89 MMHG | HEIGHT: 68 IN | HEART RATE: 79 BPM

## 2023-06-08 DIAGNOSIS — C61 PROSTATE CANCER METASTATIC TO BONE: ICD-10-CM

## 2023-06-08 DIAGNOSIS — C79.51 PROSTATE CANCER METASTATIC TO BONE: ICD-10-CM

## 2023-06-08 DIAGNOSIS — C79.51 MALIGNANT NEOPLASM METASTATIC TO BONE: ICD-10-CM

## 2023-06-08 DIAGNOSIS — R97.21 RISING PSA FOLLOWING TREATMENT FOR MALIGNANT NEOPLASM OF PROSTATE: ICD-10-CM

## 2023-06-08 DIAGNOSIS — Z79.818 ENCOUNTER FOR MONITORING ANDROGEN DEPRIVATION THERAPY: ICD-10-CM

## 2023-06-08 DIAGNOSIS — C61 PROSTATE CANCER: Primary | ICD-10-CM

## 2023-06-08 PROCEDURE — 99999 PR PBB SHADOW E&M-EST. PATIENT-LVL I: CPT | Mod: PBBFAC,,,

## 2023-06-08 PROCEDURE — 4010F ACE/ARB THERAPY RXD/TAKEN: CPT | Mod: CPTII,S$GLB,, | Performed by: INTERNAL MEDICINE

## 2023-06-08 PROCEDURE — 3079F PR MOST RECENT DIASTOLIC BLOOD PRESSURE 80-89 MM HG: ICD-10-PCS | Mod: CPTII,S$GLB,, | Performed by: INTERNAL MEDICINE

## 2023-06-08 PROCEDURE — 1159F PR MEDICATION LIST DOCUMENTED IN MEDICAL RECORD: ICD-10-PCS | Mod: CPTII,S$GLB,, | Performed by: INTERNAL MEDICINE

## 2023-06-08 PROCEDURE — 3008F PR BODY MASS INDEX (BMI) DOCUMENTED: ICD-10-PCS | Mod: CPTII,S$GLB,, | Performed by: INTERNAL MEDICINE

## 2023-06-08 PROCEDURE — 3008F BODY MASS INDEX DOCD: CPT | Mod: CPTII,S$GLB,, | Performed by: INTERNAL MEDICINE

## 2023-06-08 PROCEDURE — 1160F RVW MEDS BY RX/DR IN RCRD: CPT | Mod: CPTII,S$GLB,, | Performed by: INTERNAL MEDICINE

## 2023-06-08 PROCEDURE — 99999 PR PBB SHADOW E&M-EST. PATIENT-LVL V: CPT | Mod: PBBFAC,,, | Performed by: INTERNAL MEDICINE

## 2023-06-08 PROCEDURE — 3074F PR MOST RECENT SYSTOLIC BLOOD PRESSURE < 130 MM HG: ICD-10-PCS | Mod: CPTII,S$GLB,, | Performed by: INTERNAL MEDICINE

## 2023-06-08 PROCEDURE — 4010F PR ACE/ARB THEARPY RXD/TAKEN: ICD-10-PCS | Mod: CPTII,S$GLB,, | Performed by: INTERNAL MEDICINE

## 2023-06-08 PROCEDURE — 1160F PR REVIEW ALL MEDS BY PRESCRIBER/CLIN PHARMACIST DOCUMENTED: ICD-10-PCS | Mod: CPTII,S$GLB,, | Performed by: INTERNAL MEDICINE

## 2023-06-08 PROCEDURE — 99214 PR OFFICE/OUTPT VISIT, EST, LEVL IV, 30-39 MIN: ICD-10-PCS | Mod: S$GLB,,, | Performed by: INTERNAL MEDICINE

## 2023-06-08 PROCEDURE — 99214 OFFICE O/P EST MOD 30 MIN: CPT | Mod: S$GLB,,, | Performed by: INTERNAL MEDICINE

## 2023-06-08 PROCEDURE — 99999 PR PBB SHADOW E&M-EST. PATIENT-LVL V: ICD-10-PCS | Mod: PBBFAC,,, | Performed by: INTERNAL MEDICINE

## 2023-06-08 PROCEDURE — 3079F DIAST BP 80-89 MM HG: CPT | Mod: CPTII,S$GLB,, | Performed by: INTERNAL MEDICINE

## 2023-06-08 PROCEDURE — 3074F SYST BP LT 130 MM HG: CPT | Mod: CPTII,S$GLB,, | Performed by: INTERNAL MEDICINE

## 2023-06-08 PROCEDURE — 99999 PR PBB SHADOW E&M-EST. PATIENT-LVL I: ICD-10-PCS | Mod: PBBFAC,,,

## 2023-06-08 PROCEDURE — 1159F MED LIST DOCD IN RCRD: CPT | Mod: CPTII,S$GLB,, | Performed by: INTERNAL MEDICINE

## 2023-06-08 RX ORDER — CELECOXIB 200 MG/1
CAPSULE ORAL
COMMUNITY
Start: 2023-03-24

## 2023-06-08 NOTE — PROGRESS NOTES
Subjective:       Patient ID: Paz Dunne is a 62 y.o. male.    Chief Complaint: Other Misc (Pt reports a burning sensation in diaphragm area. ) and Abdominal Pain      Referring Physician:  Dr Marino  PCP:  Dr. Sina Fenton     Diagnosis:  Hormone refractory prostate cancer-- c/w biochemical recurrence in Sept 2014, with subsequent suspicious bone mets in right shoulder in March 2017.    MRI findings were equivocal but patient had increased pain and was seen by ortho, with high degree of suspicion for mets.  Underwent palliative radiation.   h/o cT3b [place] 8 Adenocarcinoma of the prostate dx in 8/13 status post biopsy; Baseline PSA of 56.3-- put on [facility] Clinical trial with androgen deprivation preoperatively followed by radical prostatectomy done March 10, 2014, which showed extracapsular extension, seminal vesicle invasion, no lymph node invasion, no lymphovascular invasion, but with positive surgical margins.   He then completed external beam radiation therapy in August 2014.  Gynecomastia with tender bilateral breast tissue in the subareolar/periareolar region noted by the patient in early June 2015--> intermittent.   'Benign' osteosclerotic lesions read by radiology of CT from 11/11/16 at the left acetabulum, right proximal femur, and right ilium near the SI joint.    Stable on f/u CT/bone scans 2/24/17  DEXA on 3/22/19 noted osteopenia of both left/right femoral neck      Current Treatment:   Lupron 22.5 mg IM every 3 months--restarted July 2015.    Xtandi 160 mg p.o. daily started early April 2017  Prolia Q6M started 3/2019     Treatment History:  Preoperative androgen deprivation therapy with LHRH agonist given at [facility] at the time of diagnosis in August 2013.  Radical prostatectomy done March 10, 2014 showing extracapsular extension, seminal vesicle invasion, no lymph node or lymphovascular invasion, and positive surgical margins.  Biochemical recurrence of disease noted September 2014 with  negative radiographic findings--> was restarted on Lupron along with Casodex at that time.  Lupron last given September 2014.  Was discontinued in March 2015 with Casodex continued on.    Casodex started September 2014 discontinued July 2015 secondary to breast tenderness  Casodex 50 mg po daily 11/22/16--discontinued after 3 doses secondary to severe shortness of breath  Palliative radiation to the right shoulder ×10 treatments completed from August 21 - September 1, 2017.    HPI:   Patient with a history of locally advanced prostate cancer who underwent definitive surgical resection followed by adjuvant radiation.  He then had a short course of Lupron for biochemical recurrence in September of 2014. In March of 2015, he was having increasing side effects from Lupron, switched to Casodex alone.  Scans in November of 2016 showed no definitive evidence of disease.  Due to rising PSA, he underwent scans in February 2017, the showed stable subcentimeter liver lesions and stable punctate sclerotic lesions in the bony pelvis.  Due to shoulder pain, an MRI of the right shoulder was done on 03/20 4th 2017 that showed altered bone marrow signal involving the glenoid and inferior scapular spine with mild bone marrow edema, review by Dr. Carpenter was thought that this was a metastatic bone lesion.  He was started on Xtandi in April of 2017.  He then received 10 fractions of radiation from 08/21/2017 through 09/01/2017 to the right shoulder.  He has been undergoing routine surveillance including PSAs and scans.  He had an increase in his PSA and a CT scan along with bone scan on 03/11/2022 that showed no evidence of disease.  PSMA scan was done on 7 6th 22, this revealed sclerotic changes in the right scapula with moderate uptake representing known metastatic disease and no other suspicious uptake.  PSMA PET/CT scan done on 06/02/2023 revealed suspicious 5 mm hypermetabolic right external iliac lymph node and a right scapular  osseous metastatic lesion.      Interval History:   Patient in clinic today for scheduled follow up for metastatic prostate cancer, accompanied by his wife to review scan results.  Doing well overall. He continues to tolerate Xtandi.  He has no other major issues.      Past Medical History:   Diagnosis Date    Bone metastases     Dysphagia     Elevated PSA     Hiatal hernia     Hypertension     no meds    Inflammatory bowel disease     diverticulitis    Prostate cancer     Restless leg       Past Surgical History:   Procedure Laterality Date    APPENDECTOMY      CARPAL TUNNEL RELEASE  11/2021    COLONOSCOPY      CYSTOSCOPY      HAND SURGERY Left     laparoscopic hiatal repair with mesh and Toupet fundoplication      open prostatectomy      PENILE PROSTHESIS IMPLANT      PROSTATE SURGERY      positive bx    SHOULDER SURGERY Right 04/27/2021    SKIN CANCER EXCISION      WRIST SURGERY      plate     Social History     Socioeconomic History    Marital status:    Tobacco Use    Smoking status: Former     Years: 4.00     Types: Cigarettes    Smokeless tobacco: Never   Substance and Sexual Activity    Alcohol use: No     Comment: social    Drug use: No    Sexual activity: Yes     Partners: Female      Family History   Problem Relation Age of Onset    Cancer Paternal Grandfather     Cancer Cousin     Cancer Other       Review of patient's allergies indicates:   Allergen Reactions    Clindamycin Anaphylaxis     Other reaction(s): Other (See Comments)  Pt states not an allergy, just cannot take this when taking his chemo medication      Ropinirole Other (See Comments)     Loss of consciousness  Other reaction(s): Faint, Hypotension  Other reaction(s): Other (See Comments)  Hypotension    Tetanus and diphther. tox (pf) Other (See Comments) and Anaphylaxis     hypotension    Tetanus vaccines and toxoid      Other reaction(s): Other (See Comments)  Hypotension    Tetanus toxoid       Review of Systems   Constitutional:   Negative for appetite change and unexpected weight change.   HENT:  Negative for mouth sores.    Eyes:  Negative for visual disturbance.   Respiratory:  Negative for cough and shortness of breath.    Cardiovascular:  Negative for chest pain.   Gastrointestinal:  Negative for abdominal pain and diarrhea.   Genitourinary:  Negative for frequency.   Musculoskeletal:  Negative for back pain.   Integumentary:  Negative for rash.   Neurological:  Negative for headaches.   Hematological:  Negative for adenopathy.   Psychiatric/Behavioral:  The patient is not nervous/anxious.        Objective:      Physical Exam  Vitals reviewed.   Constitutional:       General: He is awake.      Appearance: Normal appearance.   HENT:      Head: Normocephalic and atraumatic.      Right Ear: Hearing normal.      Left Ear: Hearing normal.      Nose: Nose normal.   Eyes:      General: Lids are normal. Vision grossly intact.      Extraocular Movements: Extraocular movements intact.      Conjunctiva/sclera: Conjunctivae normal.   Cardiovascular:      Rate and Rhythm: Normal rate and regular rhythm.      Pulses: Normal pulses.      Heart sounds: Normal heart sounds.   Pulmonary:      Effort: Pulmonary effort is normal.      Breath sounds: Normal breath sounds. No wheezing, rhonchi or rales.   Abdominal:      General: Bowel sounds are normal.      Palpations: Abdomen is soft.      Tenderness: There is no abdominal tenderness.   Musculoskeletal:      Cervical back: Full passive range of motion without pain.      Right lower leg: No edema.      Left lower leg: No edema.      Comments: Left hand with amputations of his 1st 4 fingers up to the knuckles due to trauma, only thumb remaining   Skin:     General: Skin is warm.   Neurological:      General: No focal deficit present.      Mental Status: He is alert and oriented to person, place, and time.   Psychiatric:         Attention and Perception: Attention normal.         Mood and Affect: Mood and  affect normal.         Behavior: Behavior is cooperative.       LABS REVIEWED IN [facility]          Assessment:     Hormone refractory prostate cancer-- c/w biochemical recurrence in Sept 2014, with subsequent suspicious bone mets in right shoulder in March 2017.    MRI findings were equivocal but patient had increased pain and was seen by ortho, with high degree of suspicion for mets.  Underwent palliative radiation.   h/o cT3b [place] 8 Adenocarcinoma of the prostate dx in 8/13 status post biopsy; Baseline PSA of 56.3-- put on [facility] Clinical trial with androgen deprivation preoperatively followed by radical prostatectomy done March 10, 2014, which showed extracapsular extension, seminal vesicle invasion, no lymph node invasion, no lymphovascular invasion, but with positive surgical margins.  He then completed external beam radiation therapy in August 2014.  Gynecomastia with tender bilateral breast tissue in the subareolar/periareolar region noted by the patient in early June 2015--> intermittent.   'Benign' osteosclerotic lesions read by radiology of CT from 11/11/16 at the left acetabulum, right proximal femur, and right ilium near the SI joint.    Stable on f/u CT/bone scans 2/24/17  DEXA on 3/22/19 noted osteopenia of both left/right femoral neck       Plan:       PSMA scan done on 06/02/2023 showed a right external iliac metastatic lymph node and a right scapular osseous met.. Will refer back to Dr. Igor Moralez with radiation oncology    Plan to continue Xtandi 160 mg p.o. daily for now    Continue Lupron every 3 months    Continue Prolia every 6 months; DEXA done 02/20/2023 showing osteopenia.      RTC in 6 weeks with labs prior: CBC, CMP, PSA      All questions were answered to the best of my ability and the patient understands the plan moving forward.        Nhan Payne II, MD GUZMAN, Latanya Hernadez LPN, acted solely as a scribe for and in the presence of, Dr. Nhan Payne who performed the service.
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
## 2023-07-14 ENCOUNTER — LAB VISIT (OUTPATIENT)
Dept: LAB | Facility: HOSPITAL | Age: 63
End: 2023-07-14
Attending: FAMILY MEDICINE
Payer: COMMERCIAL

## 2023-07-14 DIAGNOSIS — C61 PROSTATE CANCER: ICD-10-CM

## 2023-07-14 LAB
ALBUMIN SERPL-MCNC: 3.8 G/DL (ref 3.4–4.8)
ALBUMIN/GLOB SERPL: 1.3 RATIO (ref 1.1–2)
ALP SERPL-CCNC: 97 UNIT/L (ref 40–150)
ALT SERPL-CCNC: 15 UNIT/L (ref 0–55)
AST SERPL-CCNC: 16 UNIT/L (ref 5–34)
BASOPHILS # BLD AUTO: 0.05 X10(3)/MCL
BASOPHILS NFR BLD AUTO: 0.7 %
BILIRUBIN DIRECT+TOT PNL SERPL-MCNC: 0.5 MG/DL
BUN SERPL-MCNC: 19.1 MG/DL (ref 8.4–25.7)
CALCIUM SERPL-MCNC: 8.8 MG/DL (ref 8.8–10)
CHLORIDE SERPL-SCNC: 105 MMOL/L (ref 98–107)
CO2 SERPL-SCNC: 27 MMOL/L (ref 23–31)
CREAT SERPL-MCNC: 1.08 MG/DL (ref 0.73–1.18)
EOSINOPHIL # BLD AUTO: 0.13 X10(3)/MCL (ref 0–0.9)
EOSINOPHIL NFR BLD AUTO: 1.9 %
ERYTHROCYTE [DISTWIDTH] IN BLOOD BY AUTOMATED COUNT: 12.4 % (ref 11.5–17)
FREE/TOTAL PSA (OLG): 0.1
GFR SERPLBLD CREATININE-BSD FMLA CKD-EPI: >60 MLS/MIN/1.73/M2
GLOBULIN SER-MCNC: 3 GM/DL (ref 2.4–3.5)
GLUCOSE SERPL-MCNC: 97 MG/DL (ref 82–115)
HCT VFR BLD AUTO: 44.7 % (ref 42–52)
HGB BLD-MCNC: 14.1 G/DL (ref 14–18)
IMM GRANULOCYTES # BLD AUTO: 0.02 X10(3)/MCL (ref 0–0.04)
IMM GRANULOCYTES NFR BLD AUTO: 0.3 %
LYMPHOCYTES # BLD AUTO: 1 X10(3)/MCL (ref 0.6–4.6)
LYMPHOCYTES NFR BLD AUTO: 14.5 %
MCH RBC QN AUTO: 28.6 PG (ref 27–31)
MCHC RBC AUTO-ENTMCNC: 31.5 G/DL (ref 33–36)
MCV RBC AUTO: 90.7 FL (ref 80–94)
MONOCYTES # BLD AUTO: 0.47 X10(3)/MCL (ref 0.1–1.3)
MONOCYTES NFR BLD AUTO: 6.8 %
NEUTROPHILS # BLD AUTO: 5.25 X10(3)/MCL (ref 2.1–9.2)
NEUTROPHILS NFR BLD AUTO: 75.8 %
PLATELET # BLD AUTO: 213 X10(3)/MCL (ref 130–400)
PMV BLD AUTO: 8.9 FL (ref 7.4–10.4)
POTASSIUM SERPL-SCNC: 4.3 MMOL/L (ref 3.5–5.1)
PROT SERPL-MCNC: 6.8 GM/DL (ref 5.8–7.6)
PSA FREE MFR SERPL: 8 %
PSA FREE SERPL-MCNC: 0.14 NG/ML
PSA SERPL-MCNC: 1.81 NG/ML
RBC # BLD AUTO: 4.93 X10(6)/MCL (ref 4.7–6.1)
SODIUM SERPL-SCNC: 138 MMOL/L (ref 136–145)
WBC # SPEC AUTO: 6.92 X10(3)/MCL (ref 4.5–11.5)

## 2023-07-14 PROCEDURE — 84153 ASSAY OF PSA TOTAL: CPT

## 2023-07-14 PROCEDURE — 36415 COLL VENOUS BLD VENIPUNCTURE: CPT

## 2023-07-14 PROCEDURE — 85025 COMPLETE CBC W/AUTO DIFF WBC: CPT

## 2023-07-14 PROCEDURE — 80053 COMPREHEN METABOLIC PANEL: CPT

## 2023-07-20 ENCOUNTER — HOSPITAL ENCOUNTER (EMERGENCY)
Facility: HOSPITAL | Age: 63
Discharge: HOME | End: 2023-07-20
Attending: STUDENT IN AN ORGANIZED HEALTH CARE EDUCATION/TRAINING PROGRAM
Payer: COMMERCIAL

## 2023-07-20 ENCOUNTER — OFFICE VISIT (OUTPATIENT)
Dept: HEMATOLOGY/ONCOLOGY | Facility: CLINIC | Age: 63
End: 2023-07-20
Payer: COMMERCIAL

## 2023-07-20 VITALS
DIASTOLIC BLOOD PRESSURE: 88 MMHG | SYSTOLIC BLOOD PRESSURE: 134 MMHG | BODY MASS INDEX: 35.73 KG/M2 | RESPIRATION RATE: 18 BRPM | TEMPERATURE: 98 F | WEIGHT: 235 LBS | HEART RATE: 79 BPM | OXYGEN SATURATION: 96 %

## 2023-07-20 VITALS
HEART RATE: 79 BPM | DIASTOLIC BLOOD PRESSURE: 89 MMHG | WEIGHT: 235.13 LBS | HEIGHT: 68 IN | SYSTOLIC BLOOD PRESSURE: 132 MMHG | BODY MASS INDEX: 35.63 KG/M2 | TEMPERATURE: 98 F | OXYGEN SATURATION: 98 % | RESPIRATION RATE: 18 BRPM

## 2023-07-20 DIAGNOSIS — K57.92 DIVERTICULITIS: Primary | ICD-10-CM

## 2023-07-20 DIAGNOSIS — C61 PROSTATE CANCER: Primary | ICD-10-CM

## 2023-07-20 DIAGNOSIS — R10.32 LEFT LOWER QUADRANT ABDOMINAL PAIN: ICD-10-CM

## 2023-07-20 DIAGNOSIS — C79.51 MALIGNANT NEOPLASM METASTATIC TO BONE: ICD-10-CM

## 2023-07-20 DIAGNOSIS — R11.2 NAUSEA AND VOMITING, UNSPECIFIED VOMITING TYPE: ICD-10-CM

## 2023-07-20 DIAGNOSIS — R11.0 NAUSEA: ICD-10-CM

## 2023-07-20 LAB
ALBUMIN SERPL-MCNC: 3.9 G/DL (ref 3.4–4.8)
ALBUMIN/GLOB SERPL: 1.3 RATIO (ref 1.1–2)
ALP SERPL-CCNC: 99 UNIT/L (ref 40–150)
ALT SERPL-CCNC: 14 UNIT/L (ref 0–55)
APTT PPP: 32 SECONDS (ref 23.2–33.7)
AST SERPL-CCNC: 16 UNIT/L (ref 5–34)
BASOPHILS # BLD AUTO: 0.04 X10(3)/MCL
BASOPHILS NFR BLD AUTO: 0.6 %
BILIRUBIN DIRECT+TOT PNL SERPL-MCNC: 0.5 MG/DL
BUN SERPL-MCNC: 20.5 MG/DL (ref 8.4–25.7)
CALCIUM SERPL-MCNC: 8.9 MG/DL (ref 8.8–10)
CHLORIDE SERPL-SCNC: 104 MMOL/L (ref 98–107)
CO2 SERPL-SCNC: 26 MMOL/L (ref 23–31)
CREAT SERPL-MCNC: 1.1 MG/DL (ref 0.73–1.18)
EOSINOPHIL # BLD AUTO: 0.13 X10(3)/MCL (ref 0–0.9)
EOSINOPHIL NFR BLD AUTO: 1.9 %
ERYTHROCYTE [DISTWIDTH] IN BLOOD BY AUTOMATED COUNT: 12.3 % (ref 11.5–17)
GFR SERPLBLD CREATININE-BSD FMLA CKD-EPI: >60 MLS/MIN/1.73/M2
GLOBULIN SER-MCNC: 3 GM/DL (ref 2.4–3.5)
GLUCOSE SERPL-MCNC: 97 MG/DL (ref 82–115)
HCT VFR BLD AUTO: 44.8 % (ref 42–52)
HGB BLD-MCNC: 14.5 G/DL (ref 14–18)
IMM GRANULOCYTES # BLD AUTO: 0.03 X10(3)/MCL (ref 0–0.04)
IMM GRANULOCYTES NFR BLD AUTO: 0.4 %
INR BLD: 0.93 (ref 0–1.3)
LACTATE SERPL-SCNC: 0.8 MMOL/L (ref 0.5–2.2)
LIPASE SERPL-CCNC: 18 U/L
LYMPHOCYTES # BLD AUTO: 0.87 X10(3)/MCL (ref 0.6–4.6)
LYMPHOCYTES NFR BLD AUTO: 12.5 %
MCH RBC QN AUTO: 28.5 PG (ref 27–31)
MCHC RBC AUTO-ENTMCNC: 32.4 G/DL (ref 33–36)
MCV RBC AUTO: 88.2 FL (ref 80–94)
MONOCYTES # BLD AUTO: 0.5 X10(3)/MCL (ref 0.1–1.3)
MONOCYTES NFR BLD AUTO: 7.2 %
NEUTROPHILS # BLD AUTO: 5.38 X10(3)/MCL (ref 2.1–9.2)
NEUTROPHILS NFR BLD AUTO: 77.4 %
NRBC BLD AUTO-RTO: 0 %
PLATELET # BLD AUTO: 238 X10(3)/MCL (ref 130–400)
PMV BLD AUTO: 9.4 FL (ref 7.4–10.4)
POTASSIUM SERPL-SCNC: 4.3 MMOL/L (ref 3.5–5.1)
PROT SERPL-MCNC: 6.9 GM/DL (ref 5.8–7.6)
PROTHROMBIN TIME: 12.4 SECONDS (ref 12.5–14.5)
RBC # BLD AUTO: 5.08 X10(6)/MCL (ref 4.7–6.1)
SODIUM SERPL-SCNC: 140 MMOL/L (ref 136–145)
WBC # SPEC AUTO: 6.95 X10(3)/MCL (ref 4.5–11.5)

## 2023-07-20 PROCEDURE — 99285 EMERGENCY DEPT VISIT HI MDM: CPT | Mod: 25

## 2023-07-20 PROCEDURE — 3008F BODY MASS INDEX DOCD: CPT | Mod: CPTII,S$GLB,, | Performed by: INTERNAL MEDICINE

## 2023-07-20 PROCEDURE — 3075F SYST BP GE 130 - 139MM HG: CPT | Mod: CPTII,S$GLB,, | Performed by: INTERNAL MEDICINE

## 2023-07-20 PROCEDURE — 3075F PR MOST RECENT SYSTOLIC BLOOD PRESS GE 130-139MM HG: ICD-10-PCS | Mod: CPTII,S$GLB,, | Performed by: INTERNAL MEDICINE

## 2023-07-20 PROCEDURE — 85730 THROMBOPLASTIN TIME PARTIAL: CPT | Performed by: STUDENT IN AN ORGANIZED HEALTH CARE EDUCATION/TRAINING PROGRAM

## 2023-07-20 PROCEDURE — 99999 PR PBB SHADOW E&M-EST. PATIENT-LVL IV: ICD-10-PCS | Mod: PBBFAC,,, | Performed by: INTERNAL MEDICINE

## 2023-07-20 PROCEDURE — 85025 COMPLETE CBC W/AUTO DIFF WBC: CPT | Performed by: STUDENT IN AN ORGANIZED HEALTH CARE EDUCATION/TRAINING PROGRAM

## 2023-07-20 PROCEDURE — 85610 PROTHROMBIN TIME: CPT | Performed by: STUDENT IN AN ORGANIZED HEALTH CARE EDUCATION/TRAINING PROGRAM

## 2023-07-20 PROCEDURE — 25500020 PHARM REV CODE 255: Performed by: STUDENT IN AN ORGANIZED HEALTH CARE EDUCATION/TRAINING PROGRAM

## 2023-07-20 PROCEDURE — 99999 PR PBB SHADOW E&M-EST. PATIENT-LVL IV: CPT | Mod: PBBFAC,,, | Performed by: INTERNAL MEDICINE

## 2023-07-20 PROCEDURE — 1160F RVW MEDS BY RX/DR IN RCRD: CPT | Mod: CPTII,S$GLB,, | Performed by: INTERNAL MEDICINE

## 2023-07-20 PROCEDURE — 3079F PR MOST RECENT DIASTOLIC BLOOD PRESSURE 80-89 MM HG: ICD-10-PCS | Mod: CPTII,S$GLB,, | Performed by: INTERNAL MEDICINE

## 2023-07-20 PROCEDURE — 4010F PR ACE/ARB THEARPY RXD/TAKEN: ICD-10-PCS | Mod: CPTII,S$GLB,, | Performed by: INTERNAL MEDICINE

## 2023-07-20 PROCEDURE — 1159F MED LIST DOCD IN RCRD: CPT | Mod: CPTII,S$GLB,, | Performed by: INTERNAL MEDICINE

## 2023-07-20 PROCEDURE — 4010F ACE/ARB THERAPY RXD/TAKEN: CPT | Mod: CPTII,S$GLB,, | Performed by: INTERNAL MEDICINE

## 2023-07-20 PROCEDURE — 83605 ASSAY OF LACTIC ACID: CPT | Performed by: STUDENT IN AN ORGANIZED HEALTH CARE EDUCATION/TRAINING PROGRAM

## 2023-07-20 PROCEDURE — 83690 ASSAY OF LIPASE: CPT | Performed by: STUDENT IN AN ORGANIZED HEALTH CARE EDUCATION/TRAINING PROGRAM

## 2023-07-20 PROCEDURE — 80053 COMPREHEN METABOLIC PANEL: CPT | Performed by: STUDENT IN AN ORGANIZED HEALTH CARE EDUCATION/TRAINING PROGRAM

## 2023-07-20 PROCEDURE — 99214 PR OFFICE/OUTPT VISIT, EST, LEVL IV, 30-39 MIN: ICD-10-PCS | Mod: S$GLB,,, | Performed by: INTERNAL MEDICINE

## 2023-07-20 PROCEDURE — 3079F DIAST BP 80-89 MM HG: CPT | Mod: CPTII,S$GLB,, | Performed by: INTERNAL MEDICINE

## 2023-07-20 PROCEDURE — 1159F PR MEDICATION LIST DOCUMENTED IN MEDICAL RECORD: ICD-10-PCS | Mod: CPTII,S$GLB,, | Performed by: INTERNAL MEDICINE

## 2023-07-20 PROCEDURE — 99214 OFFICE O/P EST MOD 30 MIN: CPT | Mod: S$GLB,,, | Performed by: INTERNAL MEDICINE

## 2023-07-20 PROCEDURE — 3008F PR BODY MASS INDEX (BMI) DOCUMENTED: ICD-10-PCS | Mod: CPTII,S$GLB,, | Performed by: INTERNAL MEDICINE

## 2023-07-20 PROCEDURE — 1160F PR REVIEW ALL MEDS BY PRESCRIBER/CLIN PHARMACIST DOCUMENTED: ICD-10-PCS | Mod: CPTII,S$GLB,, | Performed by: INTERNAL MEDICINE

## 2023-07-20 RX ORDER — ONDANSETRON 2 MG/ML
4 INJECTION INTRAMUSCULAR; INTRAVENOUS
Status: DISCONTINUED | OUTPATIENT
Start: 2023-07-20 | End: 2023-07-20 | Stop reason: HOSPADM

## 2023-07-20 RX ORDER — FLUCONAZOLE 100 MG/1
100 TABLET ORAL
COMMUNITY
Start: 2023-07-18

## 2023-07-20 RX ORDER — MORPHINE SULFATE 4 MG/ML
4 INJECTION, SOLUTION INTRAMUSCULAR; INTRAVENOUS
Status: DISCONTINUED | OUTPATIENT
Start: 2023-07-20 | End: 2023-07-20 | Stop reason: HOSPADM

## 2023-07-20 RX ORDER — CIPROFLOXACIN HYDROCHLORIDE 500 MG/1
500 TABLET, FILM COATED ORAL 2 TIMES DAILY
COMMUNITY
Start: 2023-07-18

## 2023-07-20 RX ORDER — HYDROCODONE BITARTRATE AND ACETAMINOPHEN 5; 325 MG/1; MG/1
1 TABLET ORAL EVERY 8 HOURS PRN
Qty: 15 TABLET | Refills: 0 | Status: SHIPPED | OUTPATIENT
Start: 2023-07-20 | End: 2023-07-25

## 2023-07-20 RX ORDER — METRONIDAZOLE 500 MG/1
500 TABLET ORAL 3 TIMES DAILY
COMMUNITY
Start: 2023-07-18

## 2023-07-20 RX ORDER — ONDANSETRON 4 MG/1
4 TABLET, ORALLY DISINTEGRATING ORAL EVERY 8 HOURS PRN
Qty: 15 TABLET | Refills: 0 | Status: SHIPPED | OUTPATIENT
Start: 2023-07-20 | End: 2023-07-25

## 2023-07-20 RX ADMIN — IOPAMIDOL 100 ML: 755 INJECTION, SOLUTION INTRAVENOUS at 02:07

## 2023-07-20 NOTE — ED PROVIDER NOTES
Encounter Date: 7/20/2023       History     Chief Complaint   Patient presents with    Abdominal Pain     Abdominal pain that started on Thursday. Worsening over the last couple days. Hx of diverticulitis, states diarrhea and bloating. Last radiation July 3rd, hx of prostate cancer.         Abdominal Pain  The current episode started several days ago. The onset of the illness was gradual. The abdominal pain is located in the LLQ. The abdominal pain does not radiate. The severity of the abdominal pain is 10/10. The abdominal pain is relieved by nothing. The other symptoms of the illness do not include fever, shortness of breath or dysuria.   Significant associated medical issues include diverticulitis.       Review of patient's allergies indicates:   Allergen Reactions    Clindamycin Anaphylaxis     Other reaction(s): Other (See Comments)  Pt states not an allergy, just cannot take this when taking his chemo medication      Ropinirole Other (See Comments)     Loss of consciousness  Other reaction(s): Faint, Hypotension  Other reaction(s): Other (See Comments)  Hypotension    Tetanus and diphther. tox (pf) Other (See Comments) and Anaphylaxis     hypotension    Tetanus vaccines and toxoid      Other reaction(s): Other (See Comments)  Hypotension    Tetanus toxoid      Past Medical History:   Diagnosis Date    Bone metastases     Dysphagia     Elevated PSA     Hiatal hernia     Hypertension     no meds    Inflammatory bowel disease     diverticulitis    Prostate cancer     Restless leg      Past Surgical History:   Procedure Laterality Date    APPENDECTOMY      CARPAL TUNNEL RELEASE  11/2021    COLONOSCOPY      CYSTOSCOPY      HAND SURGERY Left     laparoscopic hiatal repair with mesh and Toupet fundoplication      open prostatectomy      PENILE PROSTHESIS IMPLANT      PROSTATE SURGERY      positive bx    SHOULDER SURGERY Right 04/27/2021    SKIN CANCER EXCISION      WRIST SURGERY      plate     Family History    Problem Relation Age of Onset    Cancer Paternal Grandfather     Cancer Cousin     Cancer Other      Social History     Tobacco Use    Smoking status: Former     Years: 4.00     Types: Cigarettes    Smokeless tobacco: Never   Substance Use Topics    Alcohol use: No     Comment: social    Drug use: No     Review of Systems   Constitutional:  Negative for fever.   HENT:  Negative for sore throat.    Eyes:  Negative for visual disturbance.   Respiratory:  Negative for shortness of breath.    Cardiovascular:  Negative for chest pain.   Gastrointestinal:  Positive for abdominal pain.   Genitourinary:  Negative for dysuria.   Musculoskeletal:  Negative for joint swelling.   Skin:  Negative for rash.   Neurological:  Negative for weakness.   Psychiatric/Behavioral:  Negative for confusion.    All other systems reviewed and are negative.    Physical Exam     Initial Vitals [07/20/23 1220]   BP Pulse Resp Temp SpO2   (!) 136/91 80 20 98.3 °F (36.8 °C) 95 %      MAP       --         Physical Exam    Nursing note and vitals reviewed.  Constitutional: He appears well-developed and well-nourished. He is not diaphoretic. No distress.   HENT:   Head: Normocephalic and atraumatic.   Eyes: Conjunctivae are normal.   Neck:   Normal range of motion.  Pulmonary/Chest: No respiratory distress.   Abdominal: He exhibits no distension.   Musculoskeletal:         General: Normal range of motion.      Cervical back: Normal range of motion.     Neurological: He is alert. No cranial nerve deficit.   Normal gait.    Skin: Skin is warm and dry.   Psychiatric: He has a normal mood and affect.       ED Course   Procedures  Labs Reviewed   PROTIME-INR - Abnormal; Notable for the following components:       Result Value    PT 12.4 (*)     All other components within normal limits   CBC WITH DIFFERENTIAL - Abnormal; Notable for the following components:    MCHC 32.4 (*)     All other components within normal limits   LACTIC ACID, PLASMA - Normal    LIPASE - Normal   APTT - Normal   COMPREHENSIVE METABOLIC PANEL   CBC W/ AUTO DIFFERENTIAL    Narrative:     The following orders were created for panel order CBC auto differential.  Procedure                               Abnormality         Status                     ---------                               -----------         ------                     CBC with Differential[[id number]]        Abnormal            Final result                 Please view results for these tests on the individual orders.          Imaging Results               CT Abdomen Pelvis With Contrast (Final result)  Result time 07/20/23 15:09:20      Final result by Lebron Ojeda MD (07/20/23 15:09:20)                   Narrative:    EXAMINATION  CT ABDOMEN PELVIS WITH CONTRAST    CLINICAL HISTORY  LLQ abdominal pain;  known history of metastatic prostate disease.    TECHNIQUE  Post-contrast helical-acquisition CT images were obtained and multiplanar reformats accomplished by a CT technologist at a separate workstation, pushed to PACS for physician review.    CONTRAST  *IV: ISOVUE-370, 100 mL  *Enteric: none    COMPARISON  2 June 2023 PET-CT    FINDINGS  Images were reviewed in soft tissue, lung, and bone windows.    Exam quality: adequate for evaluation    Lines/tubes: Components of penile prosthesis are similar in comparison.    Structures within the included lower thoracic cavity are without significant change or evidence of acute process.    The gallbladder and bile ducts are unremarkable.  Portal vein widely patent.  No acute abnormality involving the upper abdominal solid organs is identified.  Kidneys enhance in symmetric fashion, with similar size of simple appearing posterior left renal cortex cyst.  No findings of urolithiasis or distal obstructive uropathy are appreciated.  There is no focal abnormality of the urinary bladder.  Similar pelvic surgical changes of prostatectomy, without new or enlarging mass-like  abnormality.    The stomach and small bowel loops are nondistended and exhibit no CT findings to suggest acute process.  There is no acute or focal abnormality of the cecum through descending colon.  Extensive sigmoid diverticulosis is present, with circumferential moderate-length segment of proximal through mid mural thickening and pericolonic fat stranding suggestive of acute diverticulitis.  No abnormal extraluminal gas or evidence of pericolonic drainable collection identified.    No interval pathologic lymph node enlargement or development of necrotic adenopathy.    Regional musculoskeletal structures are similar.    IMPRESSION  1. Findings suggestive of developing acute sigmoid diverticulitis, without evidence of gross mural perforation or drainable collection.  2. Remainder of the abdomen and pelvis is without significant change from the 2 June 2023 CT appearance.  ==========    This report was flagged in Epic as abnormal.    RADIATION DOSE  Automated tube current modulation, weight-based exposure dosing, and/or iterative reconstruction technique utilized to reach lowest reasonably achievable exposure rate.    DLP: 1517 mGy*cm      Electronically signed by: Lebron Ojeda  Date:    07/20/2023  Time:    15:09                                     Medications   morphine injection 4 mg (4 mg Intravenous Not Given 7/20/23 1530)   ondansetron injection 4 mg (4 mg Intravenous Not Given 7/20/23 1530)   iopamidoL (ISOVUE-370) injection 100 mL (100 mLs Intravenous Given 7/20/23 1436)     Medical Decision Making:   History:   I obtained history from: someone other than patient and primary care / consultant.       <> Summary of History: Spoke with Dr. Payne.    Old Medical Records: I decided to obtain old medical records.  Old Records Summarized: records from clinic visits, records from previous admission(s) and records from another hospital.       <> Summary of Records: Reviewed old records history of prostate cancer with  Mets, hypertension, history of diverticulitis  Initial Assessment:   Hx of LLQ pain, diverticulitis  Differential Diagnosis:   Judging by the patient's chief complaint and pertinent history, the patient has the following possible differential diagnoses, including but not limited to the following.  Some of these are deemed to be lower likelihood and some more likely based on my physical exam and history combined with possible lab work and/or imaging studies.   Please see the pertinent studies, and refer to the HPI.  Some of these diagnoses will take further evaluation to fully rule out, perhaps as an outpatient and the patient was encouraged to follow up when discharged for more comprehensive evaluation.    appendicitis, biliary disease, diverticulitis,  intraabdominal abscess, retroperitoneal abscess, gastritis, gastroenteritis, hepatitis, hernia, pancreatitis, inflammatory bowel disease, PUD, SBP, nephrolithiasis, constipation, GERD, IBS      Clinical Tests:   Lab Tests: Reviewed and Ordered  Radiological Study: Ordered and Reviewed  ED Management:      Patient is a 62-year-old male who presents to the emergency department for left lower quadrant abdominal pain.  See HPI.  See physical exam.  Imaging obtained which showed diverticulitis, no abscess or perforation.  He is currently on Cipro and Flagyl.  Has taken 1 dose of this medication.  Will continue oral antibiotics.  Return precautions discussed.  Discussed if any fever, worsening symptoms return to the emergency department.  Will prescribe nausea and pain medication due to discomfort associated with this condition.  All results discussed answered all questions at this time.  Verbalized understanding agreed to plan.           ED Course as of 07/20/23 1708   Thu Jul 20, 2023   1655 Cipro/Flagyl Picked it up last night. One of dose.   Previously of Amoxil x 7 days.   [RP]      ED Course User Index  [RP] Brent Allen MD                 Clinical Impression:    Final diagnoses:  [K57.92] Diverticulitis (Primary)  [R10.32] Left lower quadrant abdominal pain  [R11.0] Nausea  [R11.2] Nausea and vomiting, unspecified vomiting type        ED Disposition Condition    Discharge Stable          ED Prescriptions       Medication Sig Dispense Start Date End Date Auth. Provider    HYDROcodone-acetaminophen (NORCO) 5-325 mg per tablet Take 1 tablet by mouth every 8 (eight) hours as needed for Pain. 15 tablet 7/20/2023 7/25/2023 Brent Allen MD    ondansetron (ZOFRAN-ODT) 4 MG TbDL Take 1 tablet (4 mg total) by mouth every 8 (eight) hours as needed. 15 tablet 7/20/2023 7/25/2023 Brent Allen MD          Follow-up Information       Follow up With Specialties Details Why Contact Info    Sina Fenton MD Family Medicine   [address]  [facility] [address]  [phone]               Brent Allen MD  07/26/23 0937

## 2023-07-20 NOTE — PROGRESS NOTES
Subjective:       Patient ID: Paz Dunne is a 62 y.o. male.    Chief Complaint: Follow-up (Patient reports pain in lower abdomen and diarrhea. He is also having trouble urinating )      Referring Physician:  Dr Marino  PCP:  Dr. Sina Fenton     Diagnosis:  Hormone refractory prostate cancer-- c/w biochemical recurrence in Sept 2014, with subsequent suspicious bone mets in right shoulder in March 2017.    MRI findings were equivocal but patient had increased pain and was seen by ortho, with high degree of suspicion for mets.  Underwent palliative radiation.   h/o cT3b [place] 8 Adenocarcinoma of the prostate dx in 8/13 status post biopsy; Baseline PSA of 56.3-- put on [facility] Clinical trial with androgen deprivation preoperatively followed by radical prostatectomy done March 10, 2014, which showed extracapsular extension, seminal vesicle invasion, no lymph node invasion, no lymphovascular invasion, but with positive surgical margins.   He then completed external beam radiation therapy in August 2014.  Gynecomastia with tender bilateral breast tissue in the subareolar/periareolar region noted by the patient in early June 2015--> intermittent.   'Benign' osteosclerotic lesions read by radiology of CT from 11/11/16 at the left acetabulum, right proximal femur, and right ilium near the SI joint.    Stable on f/u CT/bone scans 2/24/17  DEXA on 3/22/19 noted osteopenia of both left/right femoral neck      Current Treatment:   Lupron 22.5 mg IM every 3 months--restarted July 2015.    Xtandi 160 mg p.o. daily started early April 2017  Prolia Q6M started 3/2019     Treatment History:  Preoperative androgen deprivation therapy with LHRH agonist given at [facility] at the time of diagnosis in August 2013.  Radical prostatectomy done March 10, 2014 showing extracapsular extension, seminal vesicle invasion, no lymph node or lymphovascular invasion, and positive surgical margins.  Biochemical recurrence of disease noted  September 2014 with negative radiographic findings--> was restarted on Lupron along with Casodex at that time.  Lupron last given September 2014.  Was discontinued in March 2015 with Casodex continued on.    Casodex started September 2014 discontinued July 2015 secondary to breast tenderness  Casodex 50 mg po daily 11/22/16--discontinued after 3 doses secondary to severe shortness of breath  Palliative radiation to the right shoulder ×10 treatments completed from 08/21/2017 through 09/01/2017.  SABR to the pelvic lymph node from 06/22/2023 through 07/03/2023.    HPI:   Patient with a history of locally advanced prostate cancer who underwent definitive surgical resection followed by adjuvant radiation.  He then had a short course of Lupron for biochemical recurrence in September of 2014. In March of 2015, he was having increasing side effects from Lupron, switched to Casodex alone.  Scans in November of 2016 showed no definitive evidence of disease.  Due to rising PSA, he underwent scans in February 2017, the showed stable subcentimeter liver lesions and stable punctate sclerotic lesions in the bony pelvis.  Due to shoulder pain, an MRI of the right shoulder was done on 03/20 4th 2017 that showed altered bone marrow signal involving the glenoid and inferior scapular spine with mild bone marrow edema, review by Dr. Carpenter was thought that this was a metastatic bone lesion.  He was started on Xtandi in April of 2017.  He then received 10 fractions of radiation from 08/21/2017 through 09/01/2017 to the right shoulder.  He has been undergoing routine surveillance including PSAs and scans.  He had an increase in his PSA and a CT scan along with bone scan on 03/11/2022 that showed no evidence of disease.  PSMA scan was done on 7 6th 22, this revealed sclerotic changes in the right scapula with moderate uptake representing known metastatic disease and no other suspicious uptake.  PSMA PET/CT scan done on 06/02/2023 revealed  suspicious 5 mm hypermetabolic right external iliac lymph node and a right scapular osseous metastatic lesion.  Patient underwent SAB are, completed on 07/03/2023.    Interval History:   Patient in clinic today for scheduled follow up for metastatic prostate cancer, accompanied by his wife.  He is s/p radiation.  He states that he has pretty significant abdominal pain, 7 to 8/10.  He states that he has a history of diverticulitis and has had an abdominal abscess, the pain is similar to that.      Past Medical History:   Diagnosis Date    Bone metastases     Dysphagia     Elevated PSA     Hiatal hernia     Hypertension     no meds    Inflammatory bowel disease     diverticulitis    Prostate cancer     Restless leg       Past Surgical History:   Procedure Laterality Date    APPENDECTOMY      CARPAL TUNNEL RELEASE  11/2021    COLONOSCOPY      CYSTOSCOPY      HAND SURGERY Left     laparoscopic hiatal repair with mesh and Toupet fundoplication      open prostatectomy      PENILE PROSTHESIS IMPLANT      PROSTATE SURGERY      positive bx    SHOULDER SURGERY Right 04/27/2021    SKIN CANCER EXCISION      WRIST SURGERY      plate     Social History     Socioeconomic History    Marital status:    Tobacco Use    Smoking status: Former     Years: 4.00     Types: Cigarettes    Smokeless tobacco: Never   Substance and Sexual Activity    Alcohol use: No     Comment: social    Drug use: No    Sexual activity: Yes     Partners: Female      Family History   Problem Relation Age of Onset    Cancer Paternal Grandfather     Cancer Cousin     Cancer Other       Review of patient's allergies indicates:   Allergen Reactions    Clindamycin Anaphylaxis     Other reaction(s): Other (See Comments)  Pt states not an allergy, just cannot take this when taking his chemo medication      Ropinirole Other (See Comments)     Loss of consciousness  Other reaction(s): Faint, Hypotension  Other reaction(s): Other (See Comments)  Hypotension     Tetanus and diphther. tox (pf) Other (See Comments) and Anaphylaxis     hypotension    Tetanus vaccines and toxoid      Other reaction(s): Other (See Comments)  Hypotension    Tetanus toxoid       Review of Systems   Constitutional:  Negative for appetite change and unexpected weight change.   HENT:  Negative for mouth sores.    Eyes:  Negative for visual disturbance.   Respiratory:  Negative for cough and shortness of breath.    Cardiovascular:  Negative for chest pain.   Gastrointestinal:  Negative for abdominal pain and diarrhea.   Genitourinary:  Negative for frequency.   Musculoskeletal:  Negative for back pain.   Integumentary:  Negative for rash.   Neurological:  Negative for headaches.   Hematological:  Negative for adenopathy.   Psychiatric/Behavioral:  The patient is not nervous/anxious.        Objective:      Physical Exam  Vitals reviewed.   Constitutional:       General: He is awake.      Appearance: Normal appearance.   HENT:      Head: Normocephalic and atraumatic.      Right Ear: Hearing normal.      Left Ear: Hearing normal.      Nose: Nose normal.   Eyes:      General: Lids are normal. Vision grossly intact.      Extraocular Movements: Extraocular movements intact.      Conjunctiva/sclera: Conjunctivae normal.   Cardiovascular:      Rate and Rhythm: Normal rate and regular rhythm.      Pulses: Normal pulses.      Heart sounds: Normal heart sounds.   Pulmonary:      Effort: Pulmonary effort is normal.      Breath sounds: Normal breath sounds. No wheezing, rhonchi or rales.   Abdominal:      General: Bowel sounds are normal.      Palpations: Abdomen is soft.      Tenderness: There is no abdominal tenderness.   Musculoskeletal:      Cervical back: Full passive range of motion without pain.      Right lower leg: No edema.      Left lower leg: No edema.      Comments: Left hand with amputations of his 1st 4 fingers up to the knuckles due to trauma, only thumb remaining   Skin:     General: Skin is warm.    Neurological:      General: No focal deficit present.      Mental Status: He is alert and oriented to person, place, and time.   Psychiatric:         Attention and Perception: Attention normal.         Mood and Affect: Mood and affect normal.         Behavior: Behavior is cooperative.       LABS REVIEWED IN [facility]          Assessment:     Hormone refractory prostate cancer-- c/w biochemical recurrence in Sept 2014, with subsequent suspicious bone mets in right shoulder in March 2017.    MRI findings were equivocal but patient had increased pain and was seen by ortho, with high degree of suspicion for mets.  Underwent palliative radiation.   h/o cT3b [place] 8 Adenocarcinoma of the prostate dx in 8/13 status post biopsy; Baseline PSA of 56.3-- put on [facility] Clinical trial with androgen deprivation preoperatively followed by radical prostatectomy done March 10, 2014, which showed extracapsular extension, seminal vesicle invasion, no lymph node invasion, no lymphovascular invasion, but with positive surgical margins.  He then completed external beam radiation therapy in August 2014.  Gynecomastia with tender bilateral breast tissue in the subareolar/periareolar region noted by the patient in early June 2015--> intermittent.   'Benign' osteosclerotic lesions read by radiology of CT from 11/11/16 at the left acetabulum, right proximal femur, and right ilium near the SI joint.    Stable on f/u CT/bone scans 2/24/17  DEXA on 3/22/19 noted osteopenia of both left/right femoral neck       Plan:       PSMA scan done on 06/02/2023 showed a right external iliac metastatic lymph node and a right scapular osseous met.. Will refer back to Dr. Igor Moralez with radiation oncology    Plan to continue Xtandi 160 mg p.o. daily for now    Continue Lupron every 3 months    Continue Prolia every 6 months; DEXA done 02/20/2023 showing osteopenia.    To his abdominal pain, sending him to the emergency department to rule out diverticulitis  versus abdominal abscess.  I spoke with the ER physician.      RTC in 8 weeks with labs prior: CBC, CMP, PSA      All questions were answered to the best of my ability and the patient understands the plan moving forward.        Nhan Payne II, MD I, Latanya Hernadez LPN, acted solely as a scribe for and in the presence of, Dr. Nhan Payne who performed the service.

## 2023-07-20 NOTE — FIRST PROVIDER EVALUATION
Medical screening examination initiated.  I have conducted a focused provider triage encounter, findings are as follows:    Brief history of present illness:  Patient states abdominal pain x 1 week and nausea. Patient is currently being treated for prostate CA.     There were no vitals filed for this visit.    Pertinent physical exam:  Awake, alert, ambulatory      Brief workup plan:  Labs, Imaging    Preliminary workup initiated; this workup will be continued and followed by the physician or advanced practice provider that is assigned to the patient when roomed.

## 2023-07-20 NOTE — DISCHARGE INSTRUCTIONS
Follow-up with your primary care physician.      Follow-up with your gastroenterologist.      Return to the emergency department if any new or worsening symptoms, fever, worsening abdominal pain, nausea, vomiting, or any other concerns.

## 2023-08-03 ENCOUNTER — INFUSION (OUTPATIENT)
Dept: INFUSION THERAPY | Facility: HOSPITAL | Age: 63
End: 2023-08-03
Attending: FAMILY MEDICINE
Payer: COMMERCIAL

## 2023-08-03 VITALS
RESPIRATION RATE: 16 BRPM | OXYGEN SATURATION: 100 % | SYSTOLIC BLOOD PRESSURE: 142 MMHG | HEART RATE: 82 BPM | DIASTOLIC BLOOD PRESSURE: 86 MMHG | TEMPERATURE: 98 F

## 2023-08-03 DIAGNOSIS — C61 PROSTATE CANCER: Primary | ICD-10-CM

## 2023-08-03 PROCEDURE — 96402 CHEMO HORMON ANTINEOPL SQ/IM: CPT

## 2023-08-03 PROCEDURE — 63600175 PHARM REV CODE 636 W HCPCS: Mod: JZ,JG | Performed by: INTERNAL MEDICINE

## 2023-08-03 RX ADMIN — LEUPROLIDE ACETATE 22.5 MG: KIT at 09:08

## 2023-09-08 ENCOUNTER — LAB VISIT (OUTPATIENT)
Dept: LAB | Facility: HOSPITAL | Age: 63
End: 2023-09-08
Attending: FAMILY MEDICINE
Payer: COMMERCIAL

## 2023-09-08 DIAGNOSIS — C61 PROSTATE CANCER: ICD-10-CM

## 2023-09-08 DIAGNOSIS — C79.51 MALIGNANT NEOPLASM METASTATIC TO BONE: ICD-10-CM

## 2023-09-08 LAB
ALBUMIN SERPL-MCNC: 3.7 G/DL (ref 3.4–4.8)
ALBUMIN/GLOB SERPL: 1.3 RATIO (ref 1.1–2)
ALP SERPL-CCNC: 84 UNIT/L (ref 40–150)
ALT SERPL-CCNC: 15 UNIT/L (ref 0–55)
AST SERPL-CCNC: 16 UNIT/L (ref 5–34)
BASOPHILS # BLD AUTO: 0.03 X10(3)/MCL
BASOPHILS NFR BLD AUTO: 0.7 %
BILIRUB SERPL-MCNC: 0.4 MG/DL
BUN SERPL-MCNC: 16.6 MG/DL (ref 8.4–25.7)
CALCIUM SERPL-MCNC: 8.6 MG/DL (ref 8.8–10)
CHLORIDE SERPL-SCNC: 105 MMOL/L (ref 98–107)
CO2 SERPL-SCNC: 27 MMOL/L (ref 23–31)
CREAT SERPL-MCNC: 0.88 MG/DL (ref 0.73–1.18)
EOSINOPHIL # BLD AUTO: 0.14 X10(3)/MCL (ref 0–0.9)
EOSINOPHIL NFR BLD AUTO: 3.3 %
ERYTHROCYTE [DISTWIDTH] IN BLOOD BY AUTOMATED COUNT: 11.9 % (ref 11.5–17)
FREE/TOTAL PSA (OLG): 0.1
GFR SERPLBLD CREATININE-BSD FMLA CKD-EPI: >60 MLS/MIN/1.73/M2
GLOBULIN SER-MCNC: 2.9 GM/DL (ref 2.4–3.5)
GLUCOSE SERPL-MCNC: 97 MG/DL (ref 82–115)
HCT VFR BLD AUTO: 45.2 % (ref 42–52)
HGB BLD-MCNC: 14 G/DL (ref 14–18)
IMM GRANULOCYTES # BLD AUTO: 0.01 X10(3)/MCL (ref 0–0.04)
IMM GRANULOCYTES NFR BLD AUTO: 0.2 %
LYMPHOCYTES # BLD AUTO: 1.01 X10(3)/MCL (ref 0.6–4.6)
LYMPHOCYTES NFR BLD AUTO: 23.9 %
MCH RBC QN AUTO: 27.9 PG (ref 27–31)
MCHC RBC AUTO-ENTMCNC: 31 G/DL (ref 33–36)
MCV RBC AUTO: 90.2 FL (ref 80–94)
MONOCYTES # BLD AUTO: 0.35 X10(3)/MCL (ref 0.1–1.3)
MONOCYTES NFR BLD AUTO: 8.3 %
NEUTROPHILS # BLD AUTO: 2.68 X10(3)/MCL (ref 2.1–9.2)
NEUTROPHILS NFR BLD AUTO: 63.6 %
PLATELET # BLD AUTO: 198 X10(3)/MCL (ref 130–400)
PMV BLD AUTO: 8.8 FL (ref 7.4–10.4)
POTASSIUM SERPL-SCNC: 5 MMOL/L (ref 3.5–5.1)
PROT SERPL-MCNC: 6.6 GM/DL (ref 5.8–7.6)
PSA FREE MFR SERPL: 8 %
PSA FREE SERPL-MCNC: 0.17 NG/ML
PSA SERPL-MCNC: 2.06 NG/ML
RBC # BLD AUTO: 5.01 X10(6)/MCL (ref 4.7–6.1)
SODIUM SERPL-SCNC: 141 MMOL/L (ref 136–145)
WBC # SPEC AUTO: 4.22 X10(3)/MCL (ref 4.5–11.5)

## 2023-09-08 PROCEDURE — 85025 COMPLETE CBC W/AUTO DIFF WBC: CPT

## 2023-09-08 PROCEDURE — 80053 COMPREHEN METABOLIC PANEL: CPT

## 2023-09-08 PROCEDURE — 36415 COLL VENOUS BLD VENIPUNCTURE: CPT

## 2023-09-08 PROCEDURE — 84154 ASSAY OF PSA FREE: CPT

## 2023-09-14 ENCOUNTER — OFFICE VISIT (OUTPATIENT)
Dept: HEMATOLOGY/ONCOLOGY | Facility: CLINIC | Age: 63
End: 2023-09-14
Payer: COMMERCIAL

## 2023-09-14 VITALS
SYSTOLIC BLOOD PRESSURE: 123 MMHG | HEART RATE: 70 BPM | HEIGHT: 68 IN | RESPIRATION RATE: 16 BRPM | TEMPERATURE: 98 F | OXYGEN SATURATION: 96 % | BODY MASS INDEX: 35.6 KG/M2 | DIASTOLIC BLOOD PRESSURE: 89 MMHG | WEIGHT: 234.88 LBS

## 2023-09-14 DIAGNOSIS — C79.51 PROSTATE CANCER METASTATIC TO BONE: ICD-10-CM

## 2023-09-14 DIAGNOSIS — C61 PROSTATE CANCER: Primary | ICD-10-CM

## 2023-09-14 DIAGNOSIS — R97.20 RISING PSA LEVEL: ICD-10-CM

## 2023-09-14 DIAGNOSIS — C61 PROSTATE CANCER METASTATIC TO BONE: ICD-10-CM

## 2023-09-14 PROCEDURE — 3008F PR BODY MASS INDEX (BMI) DOCUMENTED: ICD-10-PCS | Mod: CPTII,S$GLB,, | Performed by: NURSE PRACTITIONER

## 2023-09-14 PROCEDURE — 3079F DIAST BP 80-89 MM HG: CPT | Mod: CPTII,S$GLB,, | Performed by: NURSE PRACTITIONER

## 2023-09-14 PROCEDURE — 1160F PR REVIEW ALL MEDS BY PRESCRIBER/CLIN PHARMACIST DOCUMENTED: ICD-10-PCS | Mod: CPTII,S$GLB,, | Performed by: NURSE PRACTITIONER

## 2023-09-14 PROCEDURE — 3074F SYST BP LT 130 MM HG: CPT | Mod: CPTII,S$GLB,, | Performed by: NURSE PRACTITIONER

## 2023-09-14 PROCEDURE — 1160F RVW MEDS BY RX/DR IN RCRD: CPT | Mod: CPTII,S$GLB,, | Performed by: NURSE PRACTITIONER

## 2023-09-14 PROCEDURE — 3079F PR MOST RECENT DIASTOLIC BLOOD PRESSURE 80-89 MM HG: ICD-10-PCS | Mod: CPTII,S$GLB,, | Performed by: NURSE PRACTITIONER

## 2023-09-14 PROCEDURE — 99214 OFFICE O/P EST MOD 30 MIN: CPT | Mod: S$GLB,,, | Performed by: NURSE PRACTITIONER

## 2023-09-14 PROCEDURE — 3008F BODY MASS INDEX DOCD: CPT | Mod: CPTII,S$GLB,, | Performed by: NURSE PRACTITIONER

## 2023-09-14 PROCEDURE — 4010F PR ACE/ARB THEARPY RXD/TAKEN: ICD-10-PCS | Mod: CPTII,S$GLB,, | Performed by: NURSE PRACTITIONER

## 2023-09-14 PROCEDURE — 1159F PR MEDICATION LIST DOCUMENTED IN MEDICAL RECORD: ICD-10-PCS | Mod: CPTII,S$GLB,, | Performed by: NURSE PRACTITIONER

## 2023-09-14 PROCEDURE — 4010F ACE/ARB THERAPY RXD/TAKEN: CPT | Mod: CPTII,S$GLB,, | Performed by: NURSE PRACTITIONER

## 2023-09-14 PROCEDURE — 99999 PR PBB SHADOW E&M-EST. PATIENT-LVL V: CPT | Mod: PBBFAC,,, | Performed by: NURSE PRACTITIONER

## 2023-09-14 PROCEDURE — 1159F MED LIST DOCD IN RCRD: CPT | Mod: CPTII,S$GLB,, | Performed by: NURSE PRACTITIONER

## 2023-09-14 PROCEDURE — 99999 PR PBB SHADOW E&M-EST. PATIENT-LVL V: ICD-10-PCS | Mod: PBBFAC,,, | Performed by: NURSE PRACTITIONER

## 2023-09-14 PROCEDURE — 99214 PR OFFICE/OUTPT VISIT, EST, LEVL IV, 30-39 MIN: ICD-10-PCS | Mod: S$GLB,,, | Performed by: NURSE PRACTITIONER

## 2023-09-14 PROCEDURE — 3074F PR MOST RECENT SYSTOLIC BLOOD PRESSURE < 130 MM HG: ICD-10-PCS | Mod: CPTII,S$GLB,, | Performed by: NURSE PRACTITIONER

## 2023-09-14 RX ORDER — AMOXICILLIN 875 MG/1
875 TABLET, FILM COATED ORAL 2 TIMES DAILY
COMMUNITY
Start: 2023-07-18

## 2023-09-14 NOTE — PROGRESS NOTES
Subjective:       Patient ID: Paz Dunne is a 62 y.o. male.    Chief Complaint: Follow-up (Patient has concerns about his PSA levels and weight loss)      Referring Physician:  Dr Marino  PCP:  Dr. Sina Fenton     Diagnosis:  Hormone refractory prostate cancer-- c/w biochemical recurrence in Sept 2014, with subsequent suspicious bone mets in right shoulder in March 2017.    MRI findings were equivocal but patient had increased pain and was seen by ortho, with high degree of suspicion for mets.  Underwent palliative radiation.   h/o cT3b [place] 8 Adenocarcinoma of the prostate dx in 8/13 status post biopsy; Baseline PSA of 56.3-- put on [facility] Clinical trial with androgen deprivation preoperatively followed by radical prostatectomy done March 10, 2014, which showed extracapsular extension, seminal vesicle invasion, no lymph node invasion, no lymphovascular invasion, but with positive surgical margins.   He then completed external beam radiation therapy in August 2014.  Gynecomastia with tender bilateral breast tissue in the subareolar/periareolar region noted by the patient in early June 2015--> intermittent.   'Benign' osteosclerotic lesions read by radiology of CT from 11/11/16 at the left acetabulum, right proximal femur, and right ilium near the SI joint.    Stable on f/u CT/bone scans 2/24/17  DEXA on 3/22/19 noted osteopenia of both left/right femoral neck      Current Treatment:   Lupron 22.5 mg IM every 3 months--restarted July 2015.    Xtandi 160 mg p.o. daily started early April 2017  Prolia Q6M started 3/2019     Treatment History:  Preoperative androgen deprivation therapy with LHRH agonist given at [facility] at the time of diagnosis in August 2013.  Radical prostatectomy done March 10, 2014 showing extracapsular extension, seminal vesicle invasion, no lymph node or lymphovascular invasion, and positive surgical margins.  Biochemical recurrence of disease noted September 2014 with negative  radiographic findings--> was restarted on Lupron along with Casodex at that time.  Lupron last given September 2014.  Was discontinued in March 2015 with Casodex continued on.    Casodex started September 2014 discontinued July 2015 secondary to breast tenderness  Casodex 50 mg po daily 11/22/16--discontinued after 3 doses secondary to severe shortness of breath  Palliative radiation to the right shoulder ×10 treatments completed from 08/21/2017 through 09/01/2017.  SABR to the pelvic lymph node from 06/22/2023 through 07/03/2023.    HPI:   Patient with a history of locally advanced prostate cancer who underwent definitive surgical resection followed by adjuvant radiation.  He then had a short course of Lupron for biochemical recurrence in September of 2014. In March of 2015, he was having increasing side effects from Lupron, switched to Casodex alone.  Scans in November of 2016 showed no definitive evidence of disease.  Due to rising PSA, he underwent scans in February 2017, the showed stable subcentimeter liver lesions and stable punctate sclerotic lesions in the bony pelvis.  Due to shoulder pain, an MRI of the right shoulder was done on 03/20 4th 2017 that showed altered bone marrow signal involving the glenoid and inferior scapular spine with mild bone marrow edema, review by Dr. Carpetner was thought that this was a metastatic bone lesion.  He was started on Xtandi in April of 2017.  He then received 10 fractions of radiation from 08/21/2017 through 09/01/2017 to the right shoulder.  He has been undergoing routine surveillance including PSAs and scans.  He had an increase in his PSA and a CT scan along with bone scan on 03/11/2022 that showed no evidence of disease.  PSMA scan was done on 7 6th 22, this revealed sclerotic changes in the right scapula with moderate uptake representing known metastatic disease and no other suspicious uptake.  PSMA PET/CT scan done on 06/02/2023 revealed suspicious 5 mm  hypermetabolic right external iliac lymph node and a right scapular osseous metastatic lesion.  Patient underwent SAB are, completed on 07/03/2023.    Interval History:   Patient in clinic today for scheduled follow up for metastatic prostate cancer, accompanied by his mother.  He was diagnosed with diverticulitis shortly after his last visit and he is now recovered from this.  He feels well overall but does have concerns today about his recent weight loss and rising PSA.  He states that he is eating less junk and he feels that he gets full faster lately. No new pain.      Past Medical History:   Diagnosis Date    Bone metastases     Dysphagia     Elevated PSA     Hiatal hernia     Hypertension     no meds    Inflammatory bowel disease     diverticulitis    Prostate cancer     Restless leg       Past Surgical History:   Procedure Laterality Date    APPENDECTOMY      CARPAL TUNNEL RELEASE  11/2021    COLONOSCOPY      CYSTOSCOPY      HAND SURGERY Left     laparoscopic hiatal repair with mesh and Toupet fundoplication      open prostatectomy      PENILE PROSTHESIS IMPLANT      PROSTATE SURGERY      positive bx    SHOULDER SURGERY Right 04/27/2021    SKIN CANCER EXCISION      WRIST SURGERY      plate     Social History     Socioeconomic History    Marital status:    Tobacco Use    Smoking status: Former     Types: Cigarettes    Smokeless tobacco: Never   Substance and Sexual Activity    Alcohol use: No     Comment: social    Drug use: No    Sexual activity: Yes     Partners: Female      Family History   Problem Relation Age of Onset    Cancer Paternal Grandfather     Cancer Cousin     Cancer Other       Review of patient's allergies indicates:   Allergen Reactions    Clindamycin Anaphylaxis     Other reaction(s): Other (See Comments)  Pt states not an allergy, just cannot take this when taking his chemo medication      Ropinirole Other (See Comments)     Loss of consciousness  Other reaction(s): Faint,  Hypotension  Other reaction(s): Other (See Comments)  Hypotension    Tetanus and diphther. tox (pf) Other (See Comments) and Anaphylaxis     hypotension    Tetanus vaccines and toxoid      Other reaction(s): Other (See Comments)  Hypotension    Tetanus toxoid       Review of Systems   Constitutional:  Negative for appetite change and unexpected weight change.   HENT:  Negative for mouth sores.    Eyes:  Negative for visual disturbance.   Respiratory:  Negative for cough and shortness of breath.    Cardiovascular:  Negative for chest pain.   Gastrointestinal:  Negative for abdominal pain and diarrhea.   Genitourinary:  Negative for frequency.   Musculoskeletal:  Negative for back pain.   Integumentary:  Negative for rash.   Neurological:  Positive for headaches.   Hematological:  Negative for adenopathy.   Psychiatric/Behavioral:  The patient is nervous/anxious.          Objective:      Physical Exam  Vitals reviewed.   Constitutional:       General: He is awake.      Appearance: Normal appearance.   HENT:      Head: Normocephalic and atraumatic.      Right Ear: Hearing normal.      Left Ear: Hearing normal.      Nose: Nose normal.   Eyes:      General: Lids are normal. Vision grossly intact.      Extraocular Movements: Extraocular movements intact.      Conjunctiva/sclera: Conjunctivae normal.   Cardiovascular:      Rate and Rhythm: Normal rate and regular rhythm.      Pulses: Normal pulses.      Heart sounds: Normal heart sounds.   Pulmonary:      Effort: Pulmonary effort is normal.      Breath sounds: Normal breath sounds. No wheezing, rhonchi or rales.   Abdominal:      General: Bowel sounds are normal.      Palpations: Abdomen is soft.      Tenderness: There is no abdominal tenderness.   Musculoskeletal:      Cervical back: Full passive range of motion without pain.      Right lower leg: No edema.      Left lower leg: No edema.      Comments: Left hand with amputations of his 1st 4 fingers up to the knuckles due  to trauma, only thumb remaining   Skin:     General: Skin is warm.   Neurological:      General: No focal deficit present.      Mental Status: He is alert and oriented to person, place, and time.   Psychiatric:         Attention and Perception: Attention normal.         Mood and Affect: Mood and affect normal.         Behavior: Behavior is cooperative.       LABS REVIEWED IN [facility]          Assessment:     Hormone refractory prostate cancer-- c/w biochemical recurrence in Sept 2014, with subsequent suspicious bone mets in right shoulder in March 2017.    MRI findings were equivocal but patient had increased pain and was seen by ortho, with high degree of suspicion for mets.  Underwent palliative radiation.   h/o cT3b [place] 8 Adenocarcinoma of the prostate dx in 8/13 status post biopsy; Baseline PSA of 56.3-- put on [facility] Clinical trial with androgen deprivation preoperatively followed by radical prostatectomy done March 10, 2014, which showed extracapsular extension, seminal vesicle invasion, no lymph node invasion, no lymphovascular invasion, but with positive surgical margins.  He then completed external beam radiation therapy in August 2014.  Gynecomastia with tender bilateral breast tissue in the subareolar/periareolar region noted by the patient in early June 2015--> intermittent.   'Benign' osteosclerotic lesions read by radiology of CT from 11/11/16 at the left acetabulum, right proximal femur, and right ilium near the SI joint.    Stable on f/u CT/bone scans 2/24/17  DEXA on 3/22/19 noted osteopenia of both left/right femoral neck       Plan:       PSMA scan done on 06/02/2023 showed a right external iliac metastatic lymph node and a right scapular osseous met.. Seen recently by Dr. Igor Moralez with radiation oncology who is planning to repeat PSMA PET in the next few weeks.     Plan to continue Xtandi 160 mg p.o. daily for now    Continue Lupron every 3 months    Continue Prolia every 6 months; DEXA done  02/20/2023 showing osteopenia.         Start omeprazole daily      RTC in 8 weeks with labs prior: CBC, CMP, PSA      All questions were answered to the best of my ability and the patient understands the plan moving forward.        ANDREW PangC

## 2023-09-27 ENCOUNTER — TELEPHONE (OUTPATIENT)
Dept: HEMATOLOGY/ONCOLOGY | Facility: CLINIC | Age: 63
End: 2023-09-27
Payer: COMMERCIAL

## 2023-09-27 NOTE — TELEPHONE ENCOUNTER
Called pt to check on nutrition status. Received nutrition screen for appetite and wt loss. He had diverticulitis earlier in the year and lost ~13#, but this stabilized in July. Pt states he was recently diagnosed with GERD and it is not fully controlled with meds. He was prescribed something that did not work well and he is on a new medicine that is helping, but he still has issues with it. He notes he switched his intake and pattern to small frequent meals throughout the day, and he is trying to avoid food that trigger the reflux. Otherwise, no n/v/c/d. He follows with PCP for GERD management. Encouraged him to reach out with further questions/concerns.

## 2023-10-02 DIAGNOSIS — C61 PROSTATIC CANCER: Primary | ICD-10-CM

## 2023-10-06 ENCOUNTER — HOSPITAL ENCOUNTER (OUTPATIENT)
Dept: RADIOLOGY | Facility: HOSPITAL | Age: 63
Discharge: HOME | End: 2023-10-06
Attending: RADIOLOGY
Payer: COMMERCIAL

## 2023-10-06 DIAGNOSIS — C61 PROSTATIC CANCER: ICD-10-CM

## 2023-10-06 PROCEDURE — 78815 PET IMAGE W/CT SKULL-THIGH: CPT | Mod: TC

## 2023-11-03 ENCOUNTER — LAB VISIT (OUTPATIENT)
Dept: LAB | Facility: HOSPITAL | Age: 63
End: 2023-11-03
Attending: INTERNAL MEDICINE
Payer: COMMERCIAL

## 2023-11-03 ENCOUNTER — INFUSION (OUTPATIENT)
Dept: INFUSION THERAPY | Facility: HOSPITAL | Age: 63
End: 2023-11-03
Attending: FAMILY MEDICINE
Payer: COMMERCIAL

## 2023-11-03 VITALS
HEART RATE: 74 BPM | DIASTOLIC BLOOD PRESSURE: 88 MMHG | SYSTOLIC BLOOD PRESSURE: 139 MMHG | HEIGHT: 68 IN | WEIGHT: 234.88 LBS | BODY MASS INDEX: 35.6 KG/M2

## 2023-11-03 DIAGNOSIS — C61 PROSTATE CANCER: Primary | ICD-10-CM

## 2023-11-03 DIAGNOSIS — R97.20 RISING PSA LEVEL: ICD-10-CM

## 2023-11-03 DIAGNOSIS — C61 PROSTATE CANCER: ICD-10-CM

## 2023-11-03 LAB
ALBUMIN SERPL-MCNC: 3.8 G/DL (ref 3.4–4.8)
ALBUMIN/GLOB SERPL: 1.3 RATIO (ref 1.1–2)
ALP SERPL-CCNC: 105 UNIT/L (ref 40–150)
ALT SERPL-CCNC: 16 UNIT/L (ref 0–55)
AST SERPL-CCNC: 14 UNIT/L (ref 5–34)
BASOPHILS # BLD AUTO: 0.04 X10(3)/MCL
BASOPHILS NFR BLD AUTO: 0.8 %
BILIRUB SERPL-MCNC: 0.4 MG/DL
BUN SERPL-MCNC: 13.4 MG/DL (ref 8.4–25.7)
CALCIUM SERPL-MCNC: 9.5 MG/DL (ref 8.8–10)
CHLORIDE SERPL-SCNC: 103 MMOL/L (ref 98–107)
CO2 SERPL-SCNC: 32 MMOL/L (ref 23–31)
CREAT SERPL-MCNC: 0.92 MG/DL (ref 0.73–1.18)
EOSINOPHIL # BLD AUTO: 0.13 X10(3)/MCL (ref 0–0.9)
EOSINOPHIL NFR BLD AUTO: 2.5 %
ERYTHROCYTE [DISTWIDTH] IN BLOOD BY AUTOMATED COUNT: 12 % (ref 11.5–17)
GFR SERPLBLD CREATININE-BSD FMLA CKD-EPI: >60 MLS/MIN/1.73/M2
GLOBULIN SER-MCNC: 3 GM/DL (ref 2.4–3.5)
GLUCOSE SERPL-MCNC: 99 MG/DL (ref 82–115)
HCT VFR BLD AUTO: 44.7 % (ref 42–52)
HGB BLD-MCNC: 13.9 G/DL (ref 14–18)
IMM GRANULOCYTES # BLD AUTO: 0.01 X10(3)/MCL (ref 0–0.04)
IMM GRANULOCYTES NFR BLD AUTO: 0.2 %
LYMPHOCYTES # BLD AUTO: 1.03 X10(3)/MCL (ref 0.6–4.6)
LYMPHOCYTES NFR BLD AUTO: 19.9 %
MCH RBC QN AUTO: 28.7 PG (ref 27–31)
MCHC RBC AUTO-ENTMCNC: 31.1 G/DL (ref 33–36)
MCV RBC AUTO: 92.2 FL (ref 80–94)
MONOCYTES # BLD AUTO: 0.41 X10(3)/MCL (ref 0.1–1.3)
MONOCYTES NFR BLD AUTO: 7.9 %
NEUTROPHILS # BLD AUTO: 3.55 X10(3)/MCL (ref 2.1–9.2)
NEUTROPHILS NFR BLD AUTO: 68.7 %
PLATELET # BLD AUTO: 217 X10(3)/MCL (ref 130–400)
PMV BLD AUTO: 8.6 FL (ref 7.4–10.4)
POTASSIUM SERPL-SCNC: 4.7 MMOL/L (ref 3.5–5.1)
PROT SERPL-MCNC: 6.8 GM/DL (ref 5.8–7.6)
PSA SERPL-MCNC: 2.22 NG/ML
RBC # BLD AUTO: 4.85 X10(6)/MCL (ref 4.7–6.1)
SODIUM SERPL-SCNC: 140 MMOL/L (ref 136–145)
WBC # SPEC AUTO: 5.17 X10(3)/MCL (ref 4.5–11.5)

## 2023-11-03 PROCEDURE — 36415 COLL VENOUS BLD VENIPUNCTURE: CPT

## 2023-11-03 PROCEDURE — 96402 CHEMO HORMON ANTINEOPL SQ/IM: CPT

## 2023-11-03 PROCEDURE — 63600175 PHARM REV CODE 636 W HCPCS: Mod: JZ,JG | Performed by: INTERNAL MEDICINE

## 2023-11-03 PROCEDURE — 80053 COMPREHEN METABOLIC PANEL: CPT

## 2023-11-03 PROCEDURE — 85025 COMPLETE CBC W/AUTO DIFF WBC: CPT

## 2023-11-03 PROCEDURE — 84153 ASSAY OF PSA TOTAL: CPT

## 2023-11-03 RX ADMIN — LEUPROLIDE ACETATE 22.5 MG: KIT at 11:11

## 2023-11-10 ENCOUNTER — OFFICE VISIT (OUTPATIENT)
Dept: HEMATOLOGY/ONCOLOGY | Facility: CLINIC | Age: 63
End: 2023-11-10
Payer: COMMERCIAL

## 2023-11-10 ENCOUNTER — INFUSION (OUTPATIENT)
Dept: INFUSION THERAPY | Facility: HOSPITAL | Age: 63
End: 2023-11-10
Attending: FAMILY MEDICINE
Payer: COMMERCIAL

## 2023-11-10 VITALS
RESPIRATION RATE: 18 BRPM | BODY MASS INDEX: 36.07 KG/M2 | DIASTOLIC BLOOD PRESSURE: 83 MMHG | WEIGHT: 229.81 LBS | HEART RATE: 72 BPM | OXYGEN SATURATION: 97 % | HEIGHT: 67 IN | SYSTOLIC BLOOD PRESSURE: 149 MMHG | TEMPERATURE: 98 F

## 2023-11-10 DIAGNOSIS — C61 PROSTATE CANCER: Primary | ICD-10-CM

## 2023-11-10 DIAGNOSIS — C79.51 MALIGNANT NEOPLASM METASTATIC TO BONE: ICD-10-CM

## 2023-11-10 PROCEDURE — 63600175 PHARM REV CODE 636 W HCPCS: Mod: JZ,JG | Performed by: INTERNAL MEDICINE

## 2023-11-10 PROCEDURE — 3077F PR MOST RECENT SYSTOLIC BLOOD PRESSURE >= 140 MM HG: ICD-10-PCS | Mod: CPTII,S$GLB,, | Performed by: INTERNAL MEDICINE

## 2023-11-10 PROCEDURE — 99999 PR PBB SHADOW E&M-EST. PATIENT-LVL V: ICD-10-PCS | Mod: PBBFAC,,, | Performed by: INTERNAL MEDICINE

## 2023-11-10 PROCEDURE — 99999 PR PBB SHADOW E&M-EST. PATIENT-LVL V: CPT | Mod: PBBFAC,,, | Performed by: INTERNAL MEDICINE

## 2023-11-10 PROCEDURE — 1159F MED LIST DOCD IN RCRD: CPT | Mod: CPTII,S$GLB,, | Performed by: INTERNAL MEDICINE

## 2023-11-10 PROCEDURE — 3008F PR BODY MASS INDEX (BMI) DOCUMENTED: ICD-10-PCS | Mod: CPTII,S$GLB,, | Performed by: INTERNAL MEDICINE

## 2023-11-10 PROCEDURE — 4010F ACE/ARB THERAPY RXD/TAKEN: CPT | Mod: CPTII,S$GLB,, | Performed by: INTERNAL MEDICINE

## 2023-11-10 PROCEDURE — 1160F PR REVIEW ALL MEDS BY PRESCRIBER/CLIN PHARMACIST DOCUMENTED: ICD-10-PCS | Mod: CPTII,S$GLB,, | Performed by: INTERNAL MEDICINE

## 2023-11-10 PROCEDURE — 99214 OFFICE O/P EST MOD 30 MIN: CPT | Mod: S$GLB,,, | Performed by: INTERNAL MEDICINE

## 2023-11-10 PROCEDURE — 4010F PR ACE/ARB THEARPY RXD/TAKEN: ICD-10-PCS | Mod: CPTII,S$GLB,, | Performed by: INTERNAL MEDICINE

## 2023-11-10 PROCEDURE — 3079F PR MOST RECENT DIASTOLIC BLOOD PRESSURE 80-89 MM HG: ICD-10-PCS | Mod: CPTII,S$GLB,, | Performed by: INTERNAL MEDICINE

## 2023-11-10 PROCEDURE — 99214 PR OFFICE/OUTPT VISIT, EST, LEVL IV, 30-39 MIN: ICD-10-PCS | Mod: S$GLB,,, | Performed by: INTERNAL MEDICINE

## 2023-11-10 PROCEDURE — 1159F PR MEDICATION LIST DOCUMENTED IN MEDICAL RECORD: ICD-10-PCS | Mod: CPTII,S$GLB,, | Performed by: INTERNAL MEDICINE

## 2023-11-10 PROCEDURE — 1160F RVW MEDS BY RX/DR IN RCRD: CPT | Mod: CPTII,S$GLB,, | Performed by: INTERNAL MEDICINE

## 2023-11-10 PROCEDURE — 3077F SYST BP >= 140 MM HG: CPT | Mod: CPTII,S$GLB,, | Performed by: INTERNAL MEDICINE

## 2023-11-10 PROCEDURE — 3079F DIAST BP 80-89 MM HG: CPT | Mod: CPTII,S$GLB,, | Performed by: INTERNAL MEDICINE

## 2023-11-10 PROCEDURE — 96372 THER/PROPH/DIAG INJ SC/IM: CPT

## 2023-11-10 PROCEDURE — 3008F BODY MASS INDEX DOCD: CPT | Mod: CPTII,S$GLB,, | Performed by: INTERNAL MEDICINE

## 2023-11-10 RX ORDER — HYDROCHLOROTHIAZIDE 25 MG/1
25 TABLET ORAL
COMMUNITY
Start: 2023-10-23

## 2023-11-10 RX ADMIN — DENOSUMAB 60 MG: 60 INJECTION SUBCUTANEOUS at 10:11

## 2023-11-10 NOTE — PROGRESS NOTES
Subjective:       Patient ID: Paz Dunne is a 63 y.o. male.    Chief Complaint: No chief complaint on file.      Referring Physician:  Dr Marino  PCP:  Dr. Sina Fenton     Diagnosis:  Hormone refractory prostate cancer-- c/w biochemical recurrence in Sept 2014, with subsequent suspicious bone mets in right shoulder in March 2017.    MRI findings were equivocal but patient had increased pain and was seen by ortho, with high degree of suspicion for mets.  Underwent palliative radiation.   h/o cT3b [place] 8 Adenocarcinoma of the prostate dx in 8/13 status post biopsy; Baseline PSA of 56.3-- put on [facility] Clinical trial with androgen deprivation preoperatively followed by radical prostatectomy done March 10, 2014, which showed extracapsular extension, seminal vesicle invasion, no lymph node invasion, no lymphovascular invasion, but with positive surgical margins.   He then completed external beam radiation therapy in August 2014.  Gynecomastia with tender bilateral breast tissue in the subareolar/periareolar region noted by the patient in early June 2015--> intermittent.   'Benign' osteosclerotic lesions read by radiology of CT from 11/11/16 at the left acetabulum, right proximal femur, and right ilium near the SI joint.    Stable on f/u CT/bone scans 2/24/17  DEXA on 3/22/19 noted osteopenia of both left/right femoral neck      Current Treatment:   Lupron 22.5 mg IM every 3 months--restarted July 2015.    Xtandi 160 mg p.o. daily started early April 2017  Prolia Q6M started 3/2019     Treatment History:  Preoperative androgen deprivation therapy with LHRH agonist given at [facility] at the time of diagnosis in August 2013.  Radical prostatectomy done March 10, 2014 showing extracapsular extension, seminal vesicle invasion, no lymph node or lymphovascular invasion, and positive surgical margins.  Biochemical recurrence of disease noted September 2014 with negative radiographic findings--> was restarted on Lupron  along with Casodex at that time.  Lupron last given September 2014.  Was discontinued in March 2015 with Casodex continued on.    Casodex started September 2014 discontinued July 2015 secondary to breast tenderness  Casodex 50 mg po daily 11/22/16--discontinued after 3 doses secondary to severe shortness of breath  Palliative radiation to the right shoulder ×10 treatments completed from 08/21/2017 through 09/01/2017.  SABR to the pelvic lymph node from 06/22/2023 through 07/03/2023.    HPI:   Patient with a history of locally advanced prostate cancer who underwent definitive surgical resection followed by adjuvant radiation.  He then had a short course of Lupron for biochemical recurrence in September of 2014. In March of 2015, he was having increasing side effects from Lupron, switched to Casodex alone.  Scans in November of 2016 showed no definitive evidence of disease.  Due to rising PSA, he underwent scans in February 2017, the showed stable subcentimeter liver lesions and stable punctate sclerotic lesions in the bony pelvis.  Due to shoulder pain, an MRI of the right shoulder was done on 03/20 4th 2017 that showed altered bone marrow signal involving the glenoid and inferior scapular spine with mild bone marrow edema, review by Dr. Carpenter was thought that this was a metastatic bone lesion.  He was started on Xtandi in April of 2017.  He then received 10 fractions of radiation from 08/21/2017 through 09/01/2017 to the right shoulder.  He has been undergoing routine surveillance including PSAs and scans.  He had an increase in his PSA and a CT scan along with bone scan on 03/11/2022 that showed no evidence of disease.  PSMA scan was done on 7 6th 22, this revealed sclerotic changes in the right scapula with moderate uptake representing known metastatic disease and no other suspicious uptake.  PSMA PET/CT scan done on 06/02/2023 revealed suspicious 5 mm hypermetabolic right external iliac lymph node and a right  scapular osseous metastatic lesion.  Patient underwent SABR, completed on 07/03/2023.  PSMA PET/CT scan done on 10/06/2023 revealed slight decreased SUV uptake within a right scapular osseous metastasis and a subcentimeter right iliac chain lymph node with no new foci of disease present.    Interval History:   Patient in clinic today for scheduled follow up for metastatic prostate cancer.  Overall, he continues to tolerate treatment.  He has no issues to discuss today.  He did have a five pound weight loss since September. Otherwise, he is doing well.      Past Medical History:   Diagnosis Date    Bone metastases     Dysphagia     Elevated PSA     Hiatal hernia     Hypertension     no meds    Inflammatory bowel disease     diverticulitis    Prostate cancer     Restless leg       Past Surgical History:   Procedure Laterality Date    APPENDECTOMY      CARPAL TUNNEL RELEASE  11/2021    COLONOSCOPY      CYSTOSCOPY      HAND SURGERY Left     laparoscopic hiatal repair with mesh and Toupet fundoplication      open prostatectomy      PENILE PROSTHESIS IMPLANT      PROSTATE SURGERY      positive bx    SHOULDER SURGERY Right 04/27/2021    SKIN CANCER EXCISION      WRIST SURGERY      plate     Social History     Socioeconomic History    Marital status:    Tobacco Use    Smoking status: Former     Types: Cigarettes    Smokeless tobacco: Never   Substance and Sexual Activity    Alcohol use: No     Comment: social    Drug use: No    Sexual activity: Yes     Partners: Female      Family History   Problem Relation Age of Onset    Cancer Paternal Grandfather     Cancer Cousin     Cancer Other       Review of patient's allergies indicates:   Allergen Reactions    Clindamycin Anaphylaxis     Other reaction(s): Other (See Comments)  Pt states not an allergy, just cannot take this when taking his chemo medication      Ropinirole Other (See Comments)     Loss of consciousness  Other reaction(s): Faint, Hypotension  Other  reaction(s): Other (See Comments)  Hypotension    Tetanus and diphther. tox (pf) Other (See Comments) and Anaphylaxis     hypotension    Tetanus vaccines and toxoid      Other reaction(s): Other (See Comments)  Hypotension    Tetanus toxoid       Review of Systems   Constitutional:  Negative for appetite change and unexpected weight change.   HENT:  Negative for mouth sores.    Eyes:  Negative for visual disturbance.   Respiratory:  Negative for cough and shortness of breath.    Cardiovascular:  Negative for chest pain.   Gastrointestinal:  Negative for abdominal pain and diarrhea.   Genitourinary:  Negative for frequency.   Musculoskeletal:  Negative for back pain.   Integumentary:  Negative for rash.   Neurological:  Negative for headaches.   Hematological:  Negative for adenopathy.   Psychiatric/Behavioral:  The patient is not nervous/anxious.          Objective:      Physical Exam  Vitals reviewed.   Constitutional:       General: He is awake.      Appearance: Normal appearance.   HENT:      Head: Normocephalic and atraumatic.      Right Ear: Hearing normal.      Left Ear: Hearing normal.      Nose: Nose normal.   Eyes:      General: Lids are normal. Vision grossly intact.      Extraocular Movements: Extraocular movements intact.      Conjunctiva/sclera: Conjunctivae normal.   Cardiovascular:      Rate and Rhythm: Normal rate and regular rhythm.      Pulses: Normal pulses.      Heart sounds: Normal heart sounds.   Pulmonary:      Effort: Pulmonary effort is normal.      Breath sounds: Normal breath sounds. No wheezing, rhonchi or rales.   Abdominal:      General: Bowel sounds are normal.      Palpations: Abdomen is soft.      Tenderness: There is no abdominal tenderness.   Musculoskeletal:      Cervical back: Full passive range of motion without pain.      Right lower leg: No edema.      Left lower leg: No edema.      Comments: Left hand with amputations of his 1st 4 fingers up to the knuckles due to trauma,  only thumb remaining   Skin:     General: Skin is warm.   Neurological:      General: No focal deficit present.      Mental Status: He is alert and oriented to person, place, and time.   Psychiatric:         Attention and Perception: Attention normal.         Mood and Affect: Mood and affect normal.         Behavior: Behavior is cooperative.         LABS REVIEWED IN [facility]          Assessment:     Hormone refractory prostate cancer-- c/w biochemical recurrence in Sept 2014, with subsequent suspicious bone mets in right shoulder in March 2017.    MRI findings were equivocal but patient had increased pain and was seen by ortho, with high degree of suspicion for mets.  Underwent palliative radiation.   h/o cT3b [place] 8 Adenocarcinoma of the prostate dx in 8/13 status post biopsy; Baseline PSA of 56.3-- put on [facility] Clinical trial with androgen deprivation preoperatively followed by radical prostatectomy done March 10, 2014, which showed extracapsular extension, seminal vesicle invasion, no lymph node invasion, no lymphovascular invasion, but with positive surgical margins.  He then completed external beam radiation therapy in August 2014.  Gynecomastia with tender bilateral breast tissue in the subareolar/periareolar region noted by the patient in early June 2015--> intermittent.   'Benign' osteosclerotic lesions read by radiology of CT from 11/11/16 at the left acetabulum, right proximal femur, and right ilium near the SI joint.    Stable on f/u CT/bone scans 2/24/17  DEXA on 3/22/19 noted osteopenia of both left/right femoral neck       Plan:       Most recent PSMA PET/CT scan done on 10/06/2023 shows improving disease with no evidence of new disease.    Plan to continue Xtandi 160 mg p.o. daily for now    Continue Lupron every 3 months    Continue Prolia every 6 months; DEXA done 02/20/2023 showing osteopenia.      RTC in 8 weeks with labs prior: CBC, CMP, PSA      All questions were answered to the best of my ability  and the patient understands the plan moving forward.        Nhan Payne II, MD GUZMAN, Latanya Hernadez LPN, acted solely as a scribe for and in the presence of, Dr. Nhan Payne who performed the service.

## 2023-12-29 ENCOUNTER — LAB VISIT (OUTPATIENT)
Dept: LAB | Facility: HOSPITAL | Age: 63
End: 2023-12-29
Attending: FAMILY MEDICINE
Payer: COMMERCIAL

## 2023-12-29 DIAGNOSIS — C79.51 MALIGNANT NEOPLASM METASTATIC TO BONE: ICD-10-CM

## 2023-12-29 DIAGNOSIS — C61 PROSTATE CANCER: ICD-10-CM

## 2023-12-29 LAB
ALBUMIN SERPL-MCNC: 3.6 G/DL (ref 3.4–4.8)
ALBUMIN/GLOB SERPL: 1.2 RATIO (ref 1.1–2)
ALP SERPL-CCNC: 105 UNIT/L (ref 40–150)
ALT SERPL-CCNC: 13 UNIT/L (ref 0–55)
AST SERPL-CCNC: 13 UNIT/L (ref 5–34)
BASOPHILS # BLD AUTO: 0.03 X10(3)/MCL
BASOPHILS NFR BLD AUTO: 0.6 %
BILIRUB SERPL-MCNC: 0.4 MG/DL
BUN SERPL-MCNC: 19 MG/DL (ref 8.4–25.7)
CALCIUM SERPL-MCNC: 8.5 MG/DL (ref 8.8–10)
CHLORIDE SERPL-SCNC: 105 MMOL/L (ref 98–107)
CO2 SERPL-SCNC: 27 MMOL/L (ref 23–31)
CREAT SERPL-MCNC: 0.92 MG/DL (ref 0.73–1.18)
EOSINOPHIL # BLD AUTO: 0.15 X10(3)/MCL (ref 0–0.9)
EOSINOPHIL NFR BLD AUTO: 2.9 %
ERYTHROCYTE [DISTWIDTH] IN BLOOD BY AUTOMATED COUNT: 11.9 % (ref 11.5–17)
GFR SERPLBLD CREATININE-BSD FMLA CKD-EPI: >60 MLS/MIN/1.73/M2
GLOBULIN SER-MCNC: 3 GM/DL (ref 2.4–3.5)
GLUCOSE SERPL-MCNC: 107 MG/DL (ref 82–115)
HCT VFR BLD AUTO: 43.6 % (ref 42–52)
HGB BLD-MCNC: 14.2 G/DL (ref 14–18)
IMM GRANULOCYTES # BLD AUTO: 0.02 X10(3)/MCL (ref 0–0.04)
IMM GRANULOCYTES NFR BLD AUTO: 0.4 %
LYMPHOCYTES # BLD AUTO: 1 X10(3)/MCL (ref 0.6–4.6)
LYMPHOCYTES NFR BLD AUTO: 19.3 %
MCH RBC QN AUTO: 29.2 PG (ref 27–31)
MCHC RBC AUTO-ENTMCNC: 32.6 G/DL (ref 33–36)
MCV RBC AUTO: 89.5 FL (ref 80–94)
MONOCYTES # BLD AUTO: 0.37 X10(3)/MCL (ref 0.1–1.3)
MONOCYTES NFR BLD AUTO: 7.1 %
NEUTROPHILS # BLD AUTO: 3.61 X10(3)/MCL (ref 2.1–9.2)
NEUTROPHILS NFR BLD AUTO: 69.7 %
PLATELET # BLD AUTO: 215 X10(3)/MCL (ref 130–400)
PMV BLD AUTO: 8.3 FL (ref 7.4–10.4)
POTASSIUM SERPL-SCNC: 4.3 MMOL/L (ref 3.5–5.1)
PROT SERPL-MCNC: 6.6 GM/DL (ref 5.8–7.6)
PSA SERPL-MCNC: 2.55 NG/ML
RBC # BLD AUTO: 4.87 X10(6)/MCL (ref 4.7–6.1)
SODIUM SERPL-SCNC: 141 MMOL/L (ref 136–145)
WBC # SPEC AUTO: 5.18 X10(3)/MCL (ref 4.5–11.5)

## 2023-12-29 PROCEDURE — 85025 COMPLETE CBC W/AUTO DIFF WBC: CPT

## 2023-12-29 PROCEDURE — 80053 COMPREHEN METABOLIC PANEL: CPT

## 2023-12-29 PROCEDURE — 84153 ASSAY OF PSA TOTAL: CPT

## 2023-12-29 PROCEDURE — 36415 COLL VENOUS BLD VENIPUNCTURE: CPT

## 2024-01-05 NOTE — PROGRESS NOTES
Subjective:       Patient ID: Paz Dunne is a 63 y.o. male.    Chief Complaint: No chief complaint on file.      Referring Physician:  Dr Marino  PCP:  Dr. Sina Fenton     Diagnosis:  Hormone refractory prostate cancer-- c/w biochemical recurrence in Sept 2014, with subsequent suspicious bone mets in right shoulder in March 2017.    MRI findings were equivocal but patient had increased pain and was seen by ortho, with high degree of suspicion for mets.  Underwent palliative radiation.   h/o cT3b [place] [facility] Adenocarcinoma of the prostate dx in 8/13 status post biopsy; Baseline PSA of 56.3-- put on [facility] Clinical trial with androgen deprivation preoperatively followed by radical prostatectomy done March 10, 2014, which showed extracapsular extension, seminal vesicle invasion, no lymph node invasion, no lymphovascular invasion, but with positive surgical margins.   He then completed external beam radiation therapy in August 2014.  Gynecomastia with tender bilateral breast tissue in the subareolar/periareolar region noted by the patient in early June 2015--> intermittent.   'Benign' osteosclerotic lesions read by radiology of CT from 11/11/16 at the left acetabulum, right proximal femur, and right ilium near the SI joint.    Stable on f/u CT/bone scans 2/24/17  DEXA on 3/22/19 noted osteopenia of both left/right femoral neck      Current Treatment:   Lupron 22.5 mg IM every 3 months--restarted July 2015.    Xtandi 160 mg p.o. daily started early April 2017  Prolia Q6M started 3/2019     Treatment History:  Preoperative androgen deprivation therapy with LHRH agonist given at [facility] at the time of diagnosis in August 2013.  Radical prostatectomy done March 10, 2014 showing extracapsular extension, seminal vesicle invasion, no lymph node or lymphovascular invasion, and positive surgical margins.  Biochemical recurrence of disease noted September 2014 with negative radiographic findings--> was restarted on Lupron  along with Casodex at that time.  Lupron last given September 2014.  Was discontinued in March 2015 with Casodex continued on.    Casodex started September 2014 discontinued July 2015 secondary to breast tenderness  Casodex 50 mg po daily 11/22/16--discontinued after 3 doses secondary to severe shortness of breath  Palliative radiation to the right shoulder ×10 treatments completed from 08/21/2017 through 09/01/2017.  SABR to the pelvic lymph node from 06/22/2023 through 07/03/2023.    HPI:   Patient with a history of locally advanced prostate cancer who underwent definitive surgical resection followed by adjuvant radiation.  He then had a short course of Lupron for biochemical recurrence in September of 2014. In March of 2015, he was having increasing side effects from Lupron, switched to Casodex alone.  Scans in November of 2016 showed no definitive evidence of disease.  Due to rising PSA, he underwent scans in February 2017, the showed stable subcentimeter liver lesions and stable punctate sclerotic lesions in the bony pelvis.  Due to shoulder pain, an MRI of the right shoulder was done on 03/20 4th 2017 that showed altered bone marrow signal involving the glenoid and inferior scapular spine with mild bone marrow edema, review by Dr. Carpenter was thought that this was a metastatic bone lesion.  He was started on Xtandi in April of 2017.  He then received 10 fractions of radiation from 08/21/2017 through 09/01/2017 to the right shoulder.  He has been undergoing routine surveillance including PSAs and scans.  He had an increase in his PSA and a CT scan along with bone scan on 03/11/2022 that showed no evidence of disease.  PSMA scan was done on 7 6th 22, this revealed sclerotic changes in the right scapula with moderate uptake representing known metastatic disease and no other suspicious uptake.  PSMA PET/CT scan done on 06/02/2023 revealed suspicious 5 mm hypermetabolic right external iliac lymph node and a right  scapular osseous metastatic lesion.  Patient underwent SABR, completed on 07/03/2023.  PSMA PET/CT scan done on 10/06/2023 revealed slight decreased SUV uptake within a right scapular osseous metastasis and a subcentimeter right iliac chain lymph node with no new foci of disease present.    Interval History:   Patient in clinic today for scheduled follow up for metastatic prostate cancer.  Overall, he continues to tolerate treatment.  He overall feels well.  He actually just got back from riding his side by side for the weekend.      Past Medical History:   Diagnosis Date    Bone metastases     Dysphagia     Elevated PSA     Hiatal hernia     Hypertension     no meds    Inflammatory bowel disease     diverticulitis    Prostate cancer     Restless leg       Past Surgical History:   Procedure Laterality Date    APPENDECTOMY      CARPAL TUNNEL RELEASE  11/2021    COLONOSCOPY      CYSTOSCOPY      HAND SURGERY Left     laparoscopic hiatal repair with mesh and Toupet fundoplication      open prostatectomy      PENILE PROSTHESIS IMPLANT      PROSTATE SURGERY      positive bx    SHOULDER SURGERY Right 04/27/2021    SKIN CANCER EXCISION      WRIST SURGERY      plate     Social History     Socioeconomic History    Marital status:    Tobacco Use    Smoking status: Former     Types: Cigarettes    Smokeless tobacco: Never   Substance and Sexual Activity    Alcohol use: No     Comment: social    Drug use: No    Sexual activity: Yes     Partners: Female      Family History   Problem Relation Age of Onset    Cancer Paternal Grandfather     Cancer Cousin     Cancer Other       Review of patient's allergies indicates:   Allergen Reactions    Clindamycin Anaphylaxis     Other reaction(s): Other (See Comments)  Pt states not an allergy, just cannot take this when taking his chemo medication      Ropinirole Other (See Comments)     Loss of consciousness  Other reaction(s): Faint, Hypotension  Other reaction(s): Other (See  Comments)  Hypotension    Tetanus and diphther. tox (pf) Other (See Comments) and Anaphylaxis     hypotension    Tetanus vaccines and toxoid      Other reaction(s): Other (See Comments)  Hypotension    Tetanus toxoid       Review of Systems   Constitutional:  Negative for appetite change and unexpected weight change.   HENT:  Negative for mouth sores.    Eyes:  Negative for visual disturbance.   Respiratory:  Negative for cough and shortness of breath.    Cardiovascular:  Negative for chest pain.   Gastrointestinal:  Negative for abdominal pain and diarrhea.   Genitourinary:  Negative for frequency.   Musculoskeletal:  Negative for back pain.   Integumentary:  Negative for rash.   Neurological:  Negative for headaches.   Hematological:  Negative for adenopathy.   Psychiatric/Behavioral:  The patient is not nervous/anxious.          Objective:      Physical Exam  Vitals reviewed.   Constitutional:       General: He is awake.      Appearance: Normal appearance.   HENT:      Head: Normocephalic and atraumatic.      Right Ear: Hearing normal.      Left Ear: Hearing normal.      Nose: Nose normal.   Eyes:      General: Lids are normal. Vision grossly intact.      Extraocular Movements: Extraocular movements intact.      Conjunctiva/sclera: Conjunctivae normal.   Cardiovascular:      Rate and Rhythm: Normal rate and regular rhythm.      Pulses: Normal pulses.      Heart sounds: Normal heart sounds.   Pulmonary:      Effort: Pulmonary effort is normal.      Breath sounds: Normal breath sounds. No wheezing, rhonchi or rales.   Abdominal:      General: Bowel sounds are normal.      Palpations: Abdomen is soft.      Tenderness: There is no abdominal tenderness.   Musculoskeletal:      Cervical back: Full passive range of motion without pain.      Right lower leg: No edema.      Left lower leg: No edema.      Comments: Left hand with amputations of his 1st 4 fingers up to the knuckles due to trauma, only thumb remaining    Skin:     General: Skin is warm.   Neurological:      General: No focal deficit present.      Mental Status: He is alert and oriented to person, place, and time.   Psychiatric:         Attention and Perception: Attention normal.         Mood and Affect: Mood and affect normal.         Behavior: Behavior is cooperative.         LABS REVIEWED IN [facility]          Assessment:     Hormone refractory prostate cancer-- c/w biochemical recurrence in Sept 2014, with subsequent suspicious bone mets in right shoulder in March 2017.    MRI findings were equivocal but patient had increased pain and was seen by ortho, with high degree of suspicion for mets.  Underwent palliative radiation.   h/o cT3b [place] 8 Adenocarcinoma of the prostate dx in 8/13 status post biopsy; Baseline PSA of 56.3-- put on [facility] Clinical trial with androgen deprivation preoperatively followed by radical prostatectomy done March 10, 2014, which showed extracapsular extension, seminal vesicle invasion, no lymph node invasion, no lymphovascular invasion, but with positive surgical margins.  He then completed external beam radiation therapy in August 2014.  Gynecomastia with tender bilateral breast tissue in the subareolar/periareolar region noted by the patient in early June 2015--> intermittent.   'Benign' osteosclerotic lesions read by radiology of CT from 11/11/16 at the left acetabulum, right proximal femur, and right ilium near the SI joint.    Stable on f/u CT/bone scans 2/24/17  DEXA on 3/22/19 noted osteopenia of both left/right femoral neck       Plan:       Most recent PSMA PET/CT scan done on 10/06/2023 shows improving disease with no evidence of new disease.    Plan to continue Xtandi 160 mg p.o. daily for now    Continue Lupron every 3 months    Continue Prolia every 6 months; DEXA done 02/20/2023 showing osteopenia.      RTC in 12 weeks with labs prior: CBC, CMP, PSA      All questions were answered to the best of my ability and the patient  understands the plan moving forward.        Nhan Payne II, MD

## 2024-01-08 ENCOUNTER — OFFICE VISIT (OUTPATIENT)
Dept: HEMATOLOGY/ONCOLOGY | Facility: CLINIC | Age: 64
End: 2024-01-08
Payer: COMMERCIAL

## 2024-01-08 VITALS
DIASTOLIC BLOOD PRESSURE: 89 MMHG | HEIGHT: 67 IN | SYSTOLIC BLOOD PRESSURE: 132 MMHG | OXYGEN SATURATION: 97 % | BODY MASS INDEX: 35.87 KG/M2 | RESPIRATION RATE: 18 BRPM | HEART RATE: 70 BPM | WEIGHT: 228.5 LBS

## 2024-01-08 DIAGNOSIS — C79.51 MALIGNANT NEOPLASM METASTATIC TO BONE: ICD-10-CM

## 2024-01-08 DIAGNOSIS — C61 PROSTATE CANCER: Primary | ICD-10-CM

## 2024-01-08 PROCEDURE — 99214 OFFICE O/P EST MOD 30 MIN: CPT | Mod: S$GLB,,, | Performed by: INTERNAL MEDICINE

## 2024-01-08 PROCEDURE — 99999 PR PBB SHADOW E&M-EST. PATIENT-LVL V: CPT | Mod: PBBFAC,,, | Performed by: INTERNAL MEDICINE

## 2024-01-08 PROCEDURE — 1159F MED LIST DOCD IN RCRD: CPT | Mod: CPTII,S$GLB,, | Performed by: INTERNAL MEDICINE

## 2024-01-08 PROCEDURE — 1160F RVW MEDS BY RX/DR IN RCRD: CPT | Mod: CPTII,S$GLB,, | Performed by: INTERNAL MEDICINE

## 2024-01-08 PROCEDURE — 3008F BODY MASS INDEX DOCD: CPT | Mod: CPTII,S$GLB,, | Performed by: INTERNAL MEDICINE

## 2024-01-08 PROCEDURE — 3079F DIAST BP 80-89 MM HG: CPT | Mod: CPTII,S$GLB,, | Performed by: INTERNAL MEDICINE

## 2024-01-08 PROCEDURE — 3075F SYST BP GE 130 - 139MM HG: CPT | Mod: CPTII,S$GLB,, | Performed by: INTERNAL MEDICINE

## 2024-02-05 ENCOUNTER — INFUSION (OUTPATIENT)
Dept: INFUSION THERAPY | Facility: HOSPITAL | Age: 64
End: 2024-02-05
Attending: FAMILY MEDICINE
Payer: COMMERCIAL

## 2024-02-05 VITALS — HEART RATE: 74 BPM | TEMPERATURE: 98 F | SYSTOLIC BLOOD PRESSURE: 142 MMHG | DIASTOLIC BLOOD PRESSURE: 84 MMHG

## 2024-02-05 DIAGNOSIS — C61 PROSTATE CANCER: Primary | ICD-10-CM

## 2024-02-05 PROCEDURE — 96372 THER/PROPH/DIAG INJ SC/IM: CPT

## 2024-02-05 PROCEDURE — 63600175 PHARM REV CODE 636 W HCPCS: Mod: JZ,JG | Performed by: NURSE PRACTITIONER

## 2024-02-05 RX ADMIN — LEUPROLIDE ACETATE 22.5 MG: KIT at 10:02

## 2024-03-18 DIAGNOSIS — C79.51 MALIGNANT NEOPLASM METASTATIC TO BONE: ICD-10-CM

## 2024-03-18 DIAGNOSIS — C61 PROSTATE CANCER: Primary | ICD-10-CM

## 2024-03-22 NOTE — MR AVS SNAPSHOT
[facility] - General Surgery  [address]  [facility] [address]  Phone: [phone]                  Paz Dunne   2017 9:45 AM   Office Visit    Description:  Male : 1960   Provider:  Cooper Keene MD   Department:  [facility] - [facility] Surgery           Reason for Visit     Follow-up           Diagnoses this Visit        Comments    Postop check    -  Primary            To Do List           Goals (5 Years of Data)     None      [facility]     [facility] Care Line -  Assistance  Registered nurses in the [facility] On Call Center provide clinical advisement, health education, appointment booking, and other advisory services.  Call for this free service at [phone].             Medications           Message regarding Medications     Verify the changes and/or additions to your medication regime listed below are the same as discussed with your clinician today.  If any of these changes or additions are incorrect, please notify your healthcare provider.             Verify that the below list of medications is an accurate representation of the medications you are currently taking.  If none reported, the list may be blank. If incorrect, please contact your healthcare provider. Carry this list with you in case of emergency.           Current Medications     diazePAM (VALIUM) 10 MG Tab Take 10 mg by mouth every evening.    hydrocodone-acetaminophen (HYCET) solution 7.5-325 mg/15mL Take 15 mLs by mouth every 4 (four) hours as needed.    leuprolide (LUPRON) 3.75 mg injection Inject 3.75 mg into the muscle every 28 days.    omeprazole (PRILOSEC) 40 MG capsule Take 1 capsule (40 mg total) by mouth once daily. Open capsule and swallow beads whole daily.    ondansetron (ZOFRAN-ODT) 8 MG TbDL Take 1 tablet (8 mg total) by mouth every 8 (eight) hours as needed.    polyethylene glycol (GLYCOLAX) 17 gram PwPk Take 17 g by mouth 2 (two) times daily as needed (constipation).     "promethazine (PHENERGAN) 25 MG suppository Place 1 suppository (25 mg total) rectally every 6 (six) hours as needed.           Clinical Reference Information           Vital Signs - Last Recorded  Most recent update: 1/12/2017 10:02 AM by Mary Chung MA    BP Pulse Temp Ht Wt BMI    133/87 74 98.5 °F (36.9 °C) 5' 8" (1.727 m) 97.5 kg (215 lb) 32.69 kg/m2      Blood Pressure          Most Recent Value    BP  133/87      Allergies as of 1/12/2017     Tetanus Vaccines And Toxoid      Immunizations Administered on Date of Encounter - 1/12/2017     None      [facility] Sign-Up     Activating your [facility] account is as easy as 1-2-3!     1) Visit [email or web address], select Sign Up Now, enter this activation code and your date of birth, then select Next.  [email or web address]  Expires: 2/26/2017 10:12 AM      2) Create a username and password to use when you visit [facility] in the future and select a security question in case you lose your password and select Next.    3) Enter your e-mail address and click Sign Up!    Additional Information  If you have questions, please e-mail [email or web address] or call [phone] to talk to our [facility] staff. Remember, [facility] is NOT to be used for urgent needs. For medical emergencies, dial 911.         " ataxic/cognition/decreased flexibility/decreased ROM/decreased strength

## 2024-04-05 ENCOUNTER — LAB VISIT (OUTPATIENT)
Dept: LAB | Facility: HOSPITAL | Age: 64
End: 2024-04-05
Attending: INTERNAL MEDICINE
Payer: COMMERCIAL

## 2024-04-05 DIAGNOSIS — C79.51 MALIGNANT NEOPLASM METASTATIC TO BONE: ICD-10-CM

## 2024-04-05 DIAGNOSIS — C61 PROSTATE CANCER: ICD-10-CM

## 2024-04-05 LAB
ALBUMIN SERPL-MCNC: 3.4 G/DL (ref 3.4–4.8)
ALBUMIN/GLOB SERPL: 1.2 RATIO (ref 1.1–2)
ALP SERPL-CCNC: 94 UNIT/L (ref 40–150)
ALT SERPL-CCNC: 14 UNIT/L (ref 0–55)
AST SERPL-CCNC: 14 UNIT/L (ref 5–34)
BASOPHILS # BLD AUTO: 0.03 X10(3)/MCL
BASOPHILS NFR BLD AUTO: 0.6 %
BILIRUB SERPL-MCNC: 0.4 MG/DL
BUN SERPL-MCNC: 16.6 MG/DL (ref 8.4–25.7)
CALCIUM SERPL-MCNC: 9 MG/DL (ref 8.8–10)
CHLORIDE SERPL-SCNC: 103 MMOL/L (ref 98–107)
CO2 SERPL-SCNC: 29 MMOL/L (ref 23–31)
CREAT SERPL-MCNC: 0.95 MG/DL (ref 0.73–1.18)
EOSINOPHIL # BLD AUTO: 0.07 X10(3)/MCL (ref 0–0.9)
EOSINOPHIL NFR BLD AUTO: 1.5 %
ERYTHROCYTE [DISTWIDTH] IN BLOOD BY AUTOMATED COUNT: 11.8 % (ref 11.5–17)
GFR SERPLBLD CREATININE-BSD FMLA CKD-EPI: >60 MLS/MIN/1.73/M2
GLOBULIN SER-MCNC: 2.9 GM/DL (ref 2.4–3.5)
GLUCOSE SERPL-MCNC: 107 MG/DL (ref 82–115)
HCT VFR BLD AUTO: 42.6 % (ref 42–52)
HGB BLD-MCNC: 13.9 G/DL (ref 14–18)
IMM GRANULOCYTES # BLD AUTO: 0.01 X10(3)/MCL (ref 0–0.04)
IMM GRANULOCYTES NFR BLD AUTO: 0.2 %
LYMPHOCYTES # BLD AUTO: 0.74 X10(3)/MCL (ref 0.6–4.6)
LYMPHOCYTES NFR BLD AUTO: 15.5 %
MCH RBC QN AUTO: 29.3 PG (ref 27–31)
MCHC RBC AUTO-ENTMCNC: 32.6 G/DL (ref 33–36)
MCV RBC AUTO: 89.7 FL (ref 80–94)
MONOCYTES # BLD AUTO: 0.52 X10(3)/MCL (ref 0.1–1.3)
MONOCYTES NFR BLD AUTO: 10.9 %
NEUTROPHILS # BLD AUTO: 3.39 X10(3)/MCL (ref 2.1–9.2)
NEUTROPHILS NFR BLD AUTO: 71.3 %
PLATELET # BLD AUTO: 177 X10(3)/MCL (ref 130–400)
PMV BLD AUTO: 8.7 FL (ref 7.4–10.4)
POTASSIUM SERPL-SCNC: 4.2 MMOL/L (ref 3.5–5.1)
PROT SERPL-MCNC: 6.3 GM/DL (ref 5.8–7.6)
PSA SERPL-MCNC: 2.67 NG/ML
RBC # BLD AUTO: 4.75 X10(6)/MCL (ref 4.7–6.1)
SODIUM SERPL-SCNC: 136 MMOL/L (ref 136–145)
WBC # SPEC AUTO: 4.76 X10(3)/MCL (ref 4.5–11.5)

## 2024-04-05 PROCEDURE — 80053 COMPREHEN METABOLIC PANEL: CPT

## 2024-04-05 PROCEDURE — 36415 COLL VENOUS BLD VENIPUNCTURE: CPT

## 2024-04-05 PROCEDURE — 84153 ASSAY OF PSA TOTAL: CPT

## 2024-04-05 PROCEDURE — 85025 COMPLETE CBC W/AUTO DIFF WBC: CPT

## 2024-04-09 ENCOUNTER — OFFICE VISIT (OUTPATIENT)
Dept: HEMATOLOGY/ONCOLOGY | Facility: CLINIC | Age: 64
End: 2024-04-09
Payer: COMMERCIAL

## 2024-04-09 VITALS
HEIGHT: 67 IN | OXYGEN SATURATION: 96 % | HEART RATE: 81 BPM | SYSTOLIC BLOOD PRESSURE: 141 MMHG | RESPIRATION RATE: 17 BRPM | TEMPERATURE: 98 F | WEIGHT: 226.19 LBS | BODY MASS INDEX: 35.5 KG/M2 | DIASTOLIC BLOOD PRESSURE: 98 MMHG

## 2024-04-09 DIAGNOSIS — C61 PROSTATE CANCER: Primary | ICD-10-CM

## 2024-04-09 DIAGNOSIS — Z79.899 ON ANTINEOPLASTIC CHEMOTHERAPY: ICD-10-CM

## 2024-04-09 DIAGNOSIS — C79.51 MALIGNANT NEOPLASM METASTATIC TO BONE: ICD-10-CM

## 2024-04-09 PROCEDURE — 1160F RVW MEDS BY RX/DR IN RCRD: CPT | Mod: CPTII,S$GLB,,

## 2024-04-09 PROCEDURE — 3008F BODY MASS INDEX DOCD: CPT | Mod: CPTII,S$GLB,,

## 2024-04-09 PROCEDURE — 3077F SYST BP >= 140 MM HG: CPT | Mod: CPTII,S$GLB,,

## 2024-04-09 PROCEDURE — 99215 OFFICE O/P EST HI 40 MIN: CPT | Mod: S$GLB,,,

## 2024-04-09 PROCEDURE — 3080F DIAST BP >= 90 MM HG: CPT | Mod: CPTII,S$GLB,,

## 2024-04-09 PROCEDURE — 99999 PR PBB SHADOW E&M-EST. PATIENT-LVL V: CPT | Mod: PBBFAC,,,

## 2024-04-09 PROCEDURE — 1159F MED LIST DOCD IN RCRD: CPT | Mod: CPTII,S$GLB,,

## 2024-04-09 NOTE — PROGRESS NOTES
Subjective:       Patient ID: Paz Dunne is a 63 y.o. male.    Chief Complaint: 3 Month Follow Up      Referring Physician:  Dr Marino  PCP:  Dr. Sina Fenton     Diagnosis:  Hormone refractory prostate cancer-- c/w biochemical recurrence in Sept 2014, with subsequent suspicious bone mets in right shoulder in March 2017.    MRI findings were equivocal but patient had increased pain and was seen by ortho, with high degree of suspicion for mets.  Underwent palliative radiation.   h/o cT3b [place] 8 Adenocarcinoma of the prostate dx in 8/13 status post biopsy; Baseline PSA of 56.3-- put on [facility] Clinical trial with androgen deprivation preoperatively followed by radical prostatectomy done March 10, 2014, which showed extracapsular extension, seminal vesicle invasion, no lymph node invasion, no lymphovascular invasion, but with positive surgical margins.   He then completed external beam radiation therapy in August 2014.  Gynecomastia with tender bilateral breast tissue in the subareolar/periareolar region noted by the patient in early June 2015--> intermittent.   'Benign' osteosclerotic lesions read by radiology of CT from 11/11/16 at the left acetabulum, right proximal femur, and right ilium near the SI joint.    Stable on f/u CT/bone scans 2/24/17  DEXA on 3/22/19 noted osteopenia of both left/right femoral neck      Current Treatment:   Lupron 22.5 mg IM every 3 months--restarted July 2015.    Xtandi 160 mg p.o. daily started early April 2017  Prolia Q6M started 3/2019     Treatment History:  Preoperative androgen deprivation therapy with LHRH agonist given at [facility] at the time of diagnosis in August 2013.  Radical prostatectomy done March 10, 2014 showing extracapsular extension, seminal vesicle invasion, no lymph node or lymphovascular invasion, and positive surgical margins.  Biochemical recurrence of disease noted September 2014 with negative radiographic findings--> was restarted on Lupron along  with Casodex at that time.  Lupron last given September 2014.  Was discontinued in March 2015 with Casodex continued on.    Casodex started September 2014 discontinued July 2015 secondary to breast tenderness  Casodex 50 mg po daily 11/22/16--discontinued after 3 doses secondary to severe shortness of breath  Palliative radiation to the right shoulder ×10 treatments completed from 08/21/2017 through 09/01/2017.  SABR to the pelvic lymph node from 06/22/2023 through 07/03/2023.    HPI:   Patient with a history of locally advanced prostate cancer who underwent definitive surgical resection followed by adjuvant radiation.  He then had a short course of Lupron for biochemical recurrence in September of 2014. In March of 2015, he was having increasing side effects from Lupron, switched to Casodex alone.  Scans in November of 2016 showed no definitive evidence of disease.  Due to rising PSA, he underwent scans in February 2017, the showed stable subcentimeter liver lesions and stable punctate sclerotic lesions in the bony pelvis.  Due to shoulder pain, an MRI of the right shoulder was done on 03/20 4th 2017 that showed altered bone marrow signal involving the glenoid and inferior scapular spine with mild bone marrow edema, review by Dr. Carpenter was thought that this was a metastatic bone lesion.  He was started on Xtandi in April of 2017.  He then received 10 fractions of radiation from 08/21/2017 through 09/01/2017 to the right shoulder.  He has been undergoing routine surveillance including PSAs and scans.  He had an increase in his PSA and a CT scan along with bone scan on 03/11/2022 that showed no evidence of disease.  PSMA scan was done on 7 6th 22, this revealed sclerotic changes in the right scapula with moderate uptake representing known metastatic disease and no other suspicious uptake.  PSMA PET/CT scan done on 06/02/2023 revealed suspicious 5 mm hypermetabolic right external iliac lymph node and a right scapular  osseous metastatic lesion.  Patient underwent SABR, completed on 07/03/2023.  PSMA PET/CT scan done on 10/06/2023 revealed slight decreased SUV uptake within a right scapular osseous metastasis and a subcentimeter right iliac chain lymph node with no new foci of disease present.    Interval History:   Patient in clinic today for scheduled follow up for metastatic prostate cancer.  Overall, he continues to tolerate treatment.  He overall feels well and denies any new pain. He was without Xtandi for about 11 days due to delay in getting prescription filled, he restarted Xtandi around 4/5/2024.      Past Medical History:   Diagnosis Date    Bone metastases     Dysphagia     Elevated PSA     Hiatal hernia     Hypertension     no meds    Inflammatory bowel disease     diverticulitis    Prostate cancer     Restless leg       Past Surgical History:   Procedure Laterality Date    APPENDECTOMY      CARPAL TUNNEL RELEASE  11/2021    COLONOSCOPY      CYSTOSCOPY      HAND SURGERY Left     laparoscopic hiatal repair with mesh and Toupet fundoplication      open prostatectomy      PENILE PROSTHESIS IMPLANT      PROSTATE SURGERY      positive bx    SHOULDER SURGERY Right 04/27/2021    SKIN CANCER EXCISION      WRIST SURGERY      plate     Social History     Socioeconomic History    Marital status:    Tobacco Use    Smoking status: Former     Types: Cigarettes    Smokeless tobacco: Never   Substance and Sexual Activity    Alcohol use: No     Comment: social    Drug use: No    Sexual activity: Yes     Partners: Female      Family History   Problem Relation Age of Onset    Cancer Paternal Grandfather     Cancer Cousin     Cancer Other       Review of patient's allergies indicates:   Allergen Reactions    Clindamycin Anaphylaxis     Other reaction(s): Other (See Comments)  Pt states not an allergy, just cannot take this when taking his chemo medication      Ropinirole Other (See Comments)     Loss of consciousness  Other  reaction(s): Faint, Hypotension  Other reaction(s): Other (See Comments)  Hypotension    Tetanus and diphther. tox (pf) Other (See Comments) and Anaphylaxis     hypotension    Tetanus vaccines and toxoid      Other reaction(s): Other (See Comments)  Hypotension    Tetanus toxoid       Review of Systems   Constitutional:  Negative for activity change, appetite change, fatigue, fever and unexpected weight change.   HENT:  Negative for mouth sores.    Eyes:  Negative for visual disturbance.   Respiratory:  Negative for cough and shortness of breath.    Cardiovascular:  Negative for chest pain and leg swelling.   Gastrointestinal:  Negative for abdominal distention, abdominal pain, anal bleeding, blood in stool and diarrhea.   Genitourinary:  Negative for frequency.   Musculoskeletal:  Negative for back pain.   Integumentary:  Negative for rash.   Allergic/Immunologic: Negative for frequent infections.   Neurological:  Negative for dizziness, weakness, light-headedness and headaches.   Hematological:  Negative for adenopathy.   Psychiatric/Behavioral:  The patient is not nervous/anxious.          Objective:      Physical Exam  Vitals reviewed.   Constitutional:       General: He is awake.      Appearance: Normal appearance.   HENT:      Head: Normocephalic and atraumatic.      Right Ear: Hearing normal.      Left Ear: Hearing normal.      Nose: Nose normal.   Eyes:      General: Lids are normal. Vision grossly intact.      Extraocular Movements: Extraocular movements intact.      Conjunctiva/sclera: Conjunctivae normal.   Cardiovascular:      Rate and Rhythm: Normal rate and regular rhythm.      Pulses: Normal pulses.      Heart sounds: Normal heart sounds.   Pulmonary:      Effort: Pulmonary effort is normal.      Breath sounds: Normal breath sounds. No wheezing, rhonchi or rales.   Abdominal:      General: Bowel sounds are normal.      Palpations: Abdomen is soft.      Tenderness: There is no abdominal tenderness.    Musculoskeletal:      Cervical back: Full passive range of motion without pain.      Right lower leg: No edema.      Left lower leg: No edema.      Comments: Left hand with amputations of his 1st 4 fingers up to the knuckles due to trauma, only thumb remaining   Skin:     General: Skin is warm.   Neurological:      General: No focal deficit present.      Mental Status: He is alert and oriented to person, place, and time.   Psychiatric:         Attention and Perception: Attention normal.         Mood and Affect: Mood and affect normal.         Behavior: Behavior is cooperative.         LABS REVIEWED IN [facility]          Assessment:     Hormone refractory prostate cancer-- c/w biochemical recurrence in Sept 2014, with subsequent suspicious bone mets in right shoulder in March 2017.    MRI findings were equivocal but patient had increased pain and was seen by ortho, with high degree of suspicion for mets.  Underwent palliative radiation.   h/o cT3b [place] 8 Adenocarcinoma of the prostate dx in 8/13 status post biopsy; Baseline PSA of 56.3-- put on [facility] Clinical trial with androgen deprivation preoperatively followed by radical prostatectomy done March 10, 2014, which showed extracapsular extension, seminal vesicle invasion, no lymph node invasion, no lymphovascular invasion, but with positive surgical margins.  He then completed external beam radiation therapy in August 2014.  Gynecomastia with tender bilateral breast tissue in the subareolar/periareolar region noted by the patient in early June 2015--> intermittent.   'Benign' osteosclerotic lesions read by radiology of CT from 11/11/16 at the left acetabulum, right proximal femur, and right ilium near the SI joint.    Stable on f/u CT/bone scans 2/24/17  DEXA on 3/22/19 noted osteopenia of both left/right femoral neck       Plan:       Most recent PSMA PET/CT scan done on 10/06/2023 shows improving disease with no evidence of new disease.    Plan to continue Xtandi 160  mg p.o. daily for now    Continue Lupron every 3 months    Continue Prolia every 6 months; DEXA done 02/20/2023 showing osteopenia.      RTC in 12 weeks with labs prior: CBC, CMP, PSA      All questions were answered to the best of my ability and the patient understands the plan moving forward.        Katalina Brandt, TIESHAP-C  Oncology/Hematology  [facility]

## 2024-05-10 ENCOUNTER — INFUSION (OUTPATIENT)
Dept: INFUSION THERAPY | Facility: HOSPITAL | Age: 64
End: 2024-05-10
Attending: FAMILY MEDICINE
Payer: COMMERCIAL

## 2024-05-10 VITALS
DIASTOLIC BLOOD PRESSURE: 99 MMHG | RESPIRATION RATE: 18 BRPM | TEMPERATURE: 98 F | SYSTOLIC BLOOD PRESSURE: 149 MMHG | HEART RATE: 72 BPM | OXYGEN SATURATION: 96 %

## 2024-05-10 DIAGNOSIS — C61 PROSTATE CANCER: Primary | ICD-10-CM

## 2024-05-10 DIAGNOSIS — C79.51 MALIGNANT NEOPLASM METASTATIC TO BONE: ICD-10-CM

## 2024-05-10 PROCEDURE — 96402 CHEMO HORMON ANTINEOPL SQ/IM: CPT

## 2024-05-10 PROCEDURE — 96372 THER/PROPH/DIAG INJ SC/IM: CPT | Mod: 59

## 2024-05-10 PROCEDURE — 63600175 PHARM REV CODE 636 W HCPCS: Mod: JZ,JG | Performed by: INTERNAL MEDICINE

## 2024-05-10 RX ORDER — LOSARTAN POTASSIUM 50 MG/1
50 TABLET ORAL
COMMUNITY
Start: 2024-04-23

## 2024-05-10 RX ADMIN — DENOSUMAB 60 MG: 60 INJECTION SUBCUTANEOUS at 09:05

## 2024-05-10 RX ADMIN — LEUPROLIDE ACETATE 22.5 MG: KIT at 09:05

## 2024-05-10 NOTE — NURSING
Pt arrived for Lupron Q3M and Prolia Q6M.  Pt tolerated Lupron IM to R. Buttock and Prolia SQ to left arm.  Discharged to home with AVS and appt calendar.

## 2024-07-05 ENCOUNTER — LAB VISIT (OUTPATIENT)
Dept: LAB | Facility: HOSPITAL | Age: 64
End: 2024-07-05
Attending: INTERNAL MEDICINE
Payer: COMMERCIAL

## 2024-07-05 DIAGNOSIS — C61 PROSTATE CANCER: ICD-10-CM

## 2024-07-05 DIAGNOSIS — C79.51 MALIGNANT NEOPLASM METASTATIC TO BONE: ICD-10-CM

## 2024-07-05 DIAGNOSIS — Z79.899 ON ANTINEOPLASTIC CHEMOTHERAPY: ICD-10-CM

## 2024-07-05 LAB
ALBUMIN SERPL-MCNC: 3.6 G/DL (ref 3.4–4.8)
ALBUMIN/GLOB SERPL: 1.2 RATIO (ref 1.1–2)
ALP SERPL-CCNC: 108 UNIT/L (ref 40–150)
ALT SERPL-CCNC: 13 UNIT/L (ref 0–55)
ANION GAP SERPL CALC-SCNC: 7 MEQ/L
AST SERPL-CCNC: 14 UNIT/L (ref 5–34)
BASOPHILS # BLD AUTO: 0.04 X10(3)/MCL
BASOPHILS NFR BLD AUTO: 0.7 %
BILIRUB SERPL-MCNC: 0.3 MG/DL
BUN SERPL-MCNC: 16.3 MG/DL (ref 8.4–25.7)
CALCIUM SERPL-MCNC: 9.2 MG/DL (ref 8.8–10)
CHLORIDE SERPL-SCNC: 104 MMOL/L (ref 98–107)
CO2 SERPL-SCNC: 30 MMOL/L (ref 23–31)
CREAT SERPL-MCNC: 0.99 MG/DL (ref 0.73–1.18)
CREAT/UREA NIT SERPL: 16
EOSINOPHIL # BLD AUTO: 0.17 X10(3)/MCL (ref 0–0.9)
EOSINOPHIL NFR BLD AUTO: 3.1 %
ERYTHROCYTE [DISTWIDTH] IN BLOOD BY AUTOMATED COUNT: 11.8 % (ref 11.5–17)
GFR SERPLBLD CREATININE-BSD FMLA CKD-EPI: >60 ML/MIN/1.73/M2
GLOBULIN SER-MCNC: 2.9 GM/DL (ref 2.4–3.5)
GLUCOSE SERPL-MCNC: 100 MG/DL (ref 82–115)
HCT VFR BLD AUTO: 44.7 % (ref 42–52)
HGB BLD-MCNC: 14.7 G/DL (ref 14–18)
IMM GRANULOCYTES # BLD AUTO: 0.02 X10(3)/MCL (ref 0–0.04)
IMM GRANULOCYTES NFR BLD AUTO: 0.4 %
LYMPHOCYTES # BLD AUTO: 1.18 X10(3)/MCL (ref 0.6–4.6)
LYMPHOCYTES NFR BLD AUTO: 21.7 %
MCH RBC QN AUTO: 29.5 PG (ref 27–31)
MCHC RBC AUTO-ENTMCNC: 32.9 G/DL (ref 33–36)
MCV RBC AUTO: 89.6 FL (ref 80–94)
MONOCYTES # BLD AUTO: 0.42 X10(3)/MCL (ref 0.1–1.3)
MONOCYTES NFR BLD AUTO: 7.7 %
NEUTROPHILS # BLD AUTO: 3.61 X10(3)/MCL (ref 2.1–9.2)
NEUTROPHILS NFR BLD AUTO: 66.4 %
PLATELET # BLD AUTO: 226 X10(3)/MCL (ref 130–400)
PMV BLD AUTO: 8.9 FL (ref 7.4–10.4)
POTASSIUM SERPL-SCNC: 5.3 MMOL/L (ref 3.5–5.1)
PROT SERPL-MCNC: 6.5 GM/DL (ref 5.8–7.6)
PSA SERPL-MCNC: 2.88 NG/ML
RBC # BLD AUTO: 4.99 X10(6)/MCL (ref 4.7–6.1)
SODIUM SERPL-SCNC: 141 MMOL/L (ref 136–145)
WBC # BLD AUTO: 5.44 X10(3)/MCL (ref 4.5–11.5)

## 2024-07-05 PROCEDURE — 85025 COMPLETE CBC W/AUTO DIFF WBC: CPT

## 2024-07-05 PROCEDURE — 36415 COLL VENOUS BLD VENIPUNCTURE: CPT

## 2024-07-05 PROCEDURE — 84153 ASSAY OF PSA TOTAL: CPT

## 2024-07-05 PROCEDURE — 80053 COMPREHEN METABOLIC PANEL: CPT

## 2024-07-18 ENCOUNTER — OFFICE VISIT (OUTPATIENT)
Dept: HEMATOLOGY/ONCOLOGY | Facility: CLINIC | Age: 64
End: 2024-07-18
Payer: COMMERCIAL

## 2024-07-18 VITALS
HEIGHT: 67 IN | RESPIRATION RATE: 18 BRPM | WEIGHT: 229.5 LBS | OXYGEN SATURATION: 98 % | BODY MASS INDEX: 36.02 KG/M2 | HEART RATE: 74 BPM | SYSTOLIC BLOOD PRESSURE: 134 MMHG | DIASTOLIC BLOOD PRESSURE: 92 MMHG

## 2024-07-18 DIAGNOSIS — C79.51 MALIGNANT NEOPLASM METASTATIC TO BONE: ICD-10-CM

## 2024-07-18 DIAGNOSIS — Z79.818 ENCOUNTER FOR MONITORING ANDROGEN DEPRIVATION THERAPY: ICD-10-CM

## 2024-07-18 DIAGNOSIS — C61 PROSTATE CANCER: Primary | ICD-10-CM

## 2024-07-18 DIAGNOSIS — R97.20 ELEVATED PSA, LESS THAN 10 NG/ML: ICD-10-CM

## 2024-07-18 PROCEDURE — 3080F DIAST BP >= 90 MM HG: CPT | Mod: CPTII,S$GLB,, | Performed by: NURSE PRACTITIONER

## 2024-07-18 PROCEDURE — 1160F RVW MEDS BY RX/DR IN RCRD: CPT | Mod: CPTII,S$GLB,, | Performed by: NURSE PRACTITIONER

## 2024-07-18 PROCEDURE — 3008F BODY MASS INDEX DOCD: CPT | Mod: CPTII,S$GLB,, | Performed by: NURSE PRACTITIONER

## 2024-07-18 PROCEDURE — 4010F ACE/ARB THERAPY RXD/TAKEN: CPT | Mod: CPTII,S$GLB,, | Performed by: NURSE PRACTITIONER

## 2024-07-18 PROCEDURE — 1159F MED LIST DOCD IN RCRD: CPT | Mod: CPTII,S$GLB,, | Performed by: NURSE PRACTITIONER

## 2024-07-18 PROCEDURE — 3075F SYST BP GE 130 - 139MM HG: CPT | Mod: CPTII,S$GLB,, | Performed by: NURSE PRACTITIONER

## 2024-07-18 PROCEDURE — 99999 PR PBB SHADOW E&M-EST. PATIENT-LVL V: CPT | Mod: PBBFAC,,, | Performed by: NURSE PRACTITIONER

## 2024-07-18 PROCEDURE — 99214 OFFICE O/P EST MOD 30 MIN: CPT | Mod: S$GLB,,, | Performed by: NURSE PRACTITIONER

## 2024-07-18 RX ORDER — DOXYCYCLINE HYCLATE 100 MG
100 TABLET ORAL 2 TIMES DAILY
COMMUNITY
Start: 2024-07-16

## 2024-07-18 RX ORDER — GUAIFENESIN AND CODEINE PHOSPHATE 100; 10 MG/5ML; MG/5ML
10 SYRUP ORAL EVERY 6 HOURS PRN
COMMUNITY
Start: 2024-07-16

## 2024-07-18 NOTE — PROGRESS NOTES
Subjective:       Patient ID: Paz Dunne is a 63 y.o. male.    Chief Complaint: Follow-up (Patient has no concerns today)      Referring Physician:  Dr Marino  PCP:  Dr. Sina Fenton     Diagnosis:  Hormone refractory prostate cancer-- c/w biochemical recurrence in Sept 2014, with subsequent suspicious bone mets in right shoulder in March 2017.    MRI findings were equivocal but patient had increased pain and was seen by ortho, with high degree of suspicion for mets.  Underwent palliative radiation.   h/o cT3b [place] 8 Adenocarcinoma of the prostate dx in 8/13 status post biopsy; Baseline PSA of 56.3-- put on [facility] Clinical trial with androgen deprivation preoperatively followed by radical prostatectomy done March 10, 2014, which showed extracapsular extension, seminal vesicle invasion, no lymph node invasion, no lymphovascular invasion, but with positive surgical margins.   He then completed external beam radiation therapy in August 2014.  Gynecomastia with tender bilateral breast tissue in the subareolar/periareolar region noted by the patient in early June 2015--> intermittent.   'Benign' osteosclerotic lesions read by radiology of CT from 11/11/16 at the left acetabulum, right proximal femur, and right ilium near the SI joint.    Stable on f/u CT/bone scans 2/24/17  DEXA on 3/22/19 noted osteopenia of both left/right femoral neck      Current Treatment:   Lupron 22.5 mg IM every 3 months--restarted July 2015.    Xtandi 160 mg p.o. daily started early April 2017  Prolia Q6M started 3/2019     Treatment History:  Preoperative androgen deprivation therapy with LHRH agonist given at [facility] at the time of diagnosis in August 2013.  Radical prostatectomy done March 10, 2014 showing extracapsular extension, seminal vesicle invasion, no lymph node or lymphovascular invasion, and positive surgical margins.  Biochemical recurrence of disease noted September 2014 with negative radiographic findings--> was  restarted on Lupron along with Casodex at that time.  Lupron last given September 2014.  Was discontinued in March 2015 with Casodex continued on.    Casodex started September 2014 discontinued July 2015 secondary to breast tenderness  Casodex 50 mg po daily 11/22/16--discontinued after 3 doses secondary to severe shortness of breath  Palliative radiation to the right shoulder ×10 treatments completed from 08/21/2017 through 09/01/2017.  SABR to the pelvic lymph node from 06/22/2023 through 07/03/2023.    HPI:   Patient with a history of locally advanced prostate cancer who underwent definitive surgical resection followed by adjuvant radiation.  He then had a short course of Lupron for biochemical recurrence in September of 2014. In March of 2015, he was having increasing side effects from Lupron, switched to Casodex alone.  Scans in November of 2016 showed no definitive evidence of disease.  Due to rising PSA, he underwent scans in February 2017, the showed stable subcentimeter liver lesions and stable punctate sclerotic lesions in the bony pelvis.  Due to shoulder pain, an MRI of the right shoulder was done on 03/20 4th 2017 that showed altered bone marrow signal involving the glenoid and inferior scapular spine with mild bone marrow edema, review by Dr. Carpenter was thought that this was a metastatic bone lesion.  He was started on Xtandi in April of 2017.  He then received 10 fractions of radiation from 08/21/2017 through 09/01/2017 to the right shoulder.  He has been undergoing routine surveillance including PSAs and scans.  He had an increase in his PSA and a CT scan along with bone scan on 03/11/2022 that showed no evidence of disease.  PSMA scan was done on 7 6th 22, this revealed sclerotic changes in the right scapula with moderate uptake representing known metastatic disease and no other suspicious uptake.  PSMA PET/CT scan done on 06/02/2023 revealed suspicious 5 mm hypermetabolic right external iliac lymph  node and a right scapular osseous metastatic lesion.  Patient underwent SABR, completed on 07/03/2023.  PSMA PET/CT scan done on 10/06/2023 revealed slight decreased SUV uptake within a right scapular osseous metastasis and a subcentimeter right iliac chain lymph node with no new foci of disease present.    Interval History:   Patient in clinic today for scheduled follow up for metastatic prostate cancer.  Overall, he reports compliance and continues to tolerate treatment.  He overall feels good and denies any new pain or other complaints.     Past Medical History:   Diagnosis Date    Bone metastases     Dysphagia     Elevated PSA     Hiatal hernia     Hypertension     no meds    Inflammatory bowel disease     diverticulitis    Prostate cancer     Restless leg       Past Surgical History:   Procedure Laterality Date    APPENDECTOMY      CARPAL TUNNEL RELEASE  11/2021    COLONOSCOPY      CYSTOSCOPY      HAND SURGERY Left     laparoscopic hiatal repair with mesh and Toupet fundoplication      open prostatectomy      PENILE PROSTHESIS IMPLANT      PROSTATE SURGERY      positive bx    SHOULDER SURGERY Right 04/27/2021    SKIN CANCER EXCISION      WRIST SURGERY      plate     Social History     Socioeconomic History    Marital status:    Tobacco Use    Smoking status: Former     Types: Cigarettes    Smokeless tobacco: Never   Substance and Sexual Activity    Alcohol use: No     Comment: social    Drug use: No    Sexual activity: Yes     Partners: Female     Social Determinants of Health     Financial Resource Strain: Unknown (9/4/2022)    Received from [facility] [employer] [facility] [employer]    Overall Financial Resource Strain (CARDIA)     Difficulty of Paying Living Expenses: Patient declined   Food Insecurity: Unknown (9/4/2022)    Received from [facility] [employer] [facility]    Hunger Vital Sign     Worried About Running Out of Food in the Last Year: Patient declined     Ran Out of Food in the Last Year: Patient declined    Transportation Needs: Unknown (9/4/2022)    Received from [facility] [employer] [facility]    PRAPARE - Transportation     Lack of Transportation (Medical): Patient declined     Lack of Transportation (Non-Medical): Patient declined   Physical Activity: Inactive (9/4/2022)    [facility]    Exercise Vital Sign     Days of Exercise per Week: 0 days     Minutes of Exercise per Session: 0 min   Stress: Stress Concern Present (9/4/2022)    Received from [facility] [employer] [facility]    [employer] [place] [employer] - Occupational Stress Questionnaire     Feeling of Stress : To some extent   Housing Stability: Unknown (9/4/2022)    Received from [facility] [employer] [facility]    Housing Stability Vital Sign     Unable to Pay for Housing in the Last Year: Patient refused     Number of Places Lived in the Last Year: 1     In the last 12 months, was there a time when you did not have a steady place to sleep or slept in a shelter (including now)?: No      Family History   Problem Relation Name Age of Onset    Cancer Paternal Grandfather skin cancer     Cancer Cousin pancreatic     Cancer Other pguncle/skin ca       Review of patient's allergies indicates:   Allergen Reactions    Clindamycin Anaphylaxis     Other reaction(s): Other (See Comments)  Pt states not an allergy, just cannot take this when taking his chemo medication      Ropinirole Other (See Comments)     Loss of consciousness  Other reaction(s): Faint, Hypotension  Other reaction(s): Other (See Comments)  Hypotension    Tetanus and diphther. tox (pf) Other (See Comments) and Anaphylaxis     hypotension    Tetanus vaccines and toxoid      Other reaction(s): Other (See Comments)  Hypotension    Tetanus toxoid       Review of Systems   Constitutional:  Negative for activity change, appetite change, fatigue, fever and unexpected weight change.   HENT:  Negative for mouth sores.    Eyes:  Negative for visual disturbance.   Respiratory:  Negative  for cough and shortness of breath.    Cardiovascular:  Negative for chest pain and leg swelling.   Gastrointestinal:  Negative for abdominal distention, abdominal pain, anal bleeding, blood in stool and diarrhea.   Genitourinary:  Negative for frequency.   Musculoskeletal:  Negative for back pain.   Integumentary:  Negative for rash.   Allergic/Immunologic: Negative for frequent infections.   Neurological:  Negative for dizziness, weakness, light-headedness and headaches.   Hematological:  Negative for adenopathy.   Psychiatric/Behavioral:  The patient is not nervous/anxious.          Objective:      Physical Exam  Vitals reviewed.   Constitutional:       General: He is awake.      Appearance: Normal appearance.   HENT:      Head: Normocephalic and atraumatic.      Right Ear: Hearing normal.      Left Ear: Hearing normal.      Nose: Nose normal.   Eyes:      General: Lids are normal. Vision grossly intact.      Extraocular Movements: Extraocular movements intact.      Conjunctiva/sclera: Conjunctivae normal.   Cardiovascular:      Rate and Rhythm: Normal rate and regular rhythm.      Pulses: Normal pulses.      Heart sounds: Normal heart sounds.   Pulmonary:      Effort: Pulmonary effort is normal.      Breath sounds: Normal breath sounds. No wheezing, rhonchi or rales.   Abdominal:      General: Bowel sounds are normal.      Palpations: Abdomen is soft.      Tenderness: There is no abdominal tenderness.   Musculoskeletal:      Cervical back: Full passive range of motion without pain.      Right lower leg: No edema.      Left lower leg: No edema.      Comments: Left hand with amputations of his 1st 4 fingers up to the knuckles due to trauma, only thumb remaining   Skin:     General: Skin is warm.   Neurological:      General: No focal deficit present.      Mental Status: He is alert and oriented to person, place, and time.   Psychiatric:         Attention and Perception: Attention normal.         Mood and Affect:  Mood and affect normal.         Behavior: Behavior is cooperative.         LABS REVIEWED IN [facility]          Assessment:     Hormone refractory prostate cancer-- c/w biochemical recurrence in Sept 2014, with subsequent suspicious bone mets in right shoulder in March 2017.    MRI findings were equivocal but patient had increased pain and was seen by ortho, with high degree of suspicion for mets.  Underwent palliative radiation.   h/o cT3b [place] 8 Adenocarcinoma of the prostate dx in 8/13 status post biopsy; Baseline PSA of 56.3-- put on [facility] Clinical trial with androgen deprivation preoperatively followed by radical prostatectomy done March 10, 2014, which showed extracapsular extension, seminal vesicle invasion, no lymph node invasion, no lymphovascular invasion, but with positive surgical margins.  He then completed external beam radiation therapy in August 2014.  Gynecomastia with tender bilateral breast tissue in the subareolar/periareolar region noted by the patient in early June 2015--> intermittent.   'Benign' osteosclerotic lesions read by radiology of CT from 11/11/16 at the left acetabulum, right proximal femur, and right ilium near the SI joint.    Stable on f/u CT/bone scans 2/24/17  DEXA on 3/22/19 noted osteopenia of both left/right femoral neck       Plan:       Most recent PSMA PET/CT scan done on 10/06/2023 shows improving disease with no evidence of new disease.    Plan to continue Xtandi 160 mg p.o. daily for now    Continue Lupron every 3 months    Continue Prolia every 6 months; DEXA done 02/20/2023 showing osteopenia.      RTC in 12 weeks with labs prior: CBC, CMP, PSA.  We will repeat PSMA PET prior to his next follow-up due to slowly increasing PSA     All questions were answered to the best of my ability and the patient understands the plan moving forward.      Vaishali Waite, FANI-C

## 2024-08-14 ENCOUNTER — INFUSION (OUTPATIENT)
Dept: INFUSION THERAPY | Facility: HOSPITAL | Age: 64
End: 2024-08-14
Attending: FAMILY MEDICINE
Payer: COMMERCIAL

## 2024-08-14 VITALS
RESPIRATION RATE: 18 BRPM | BODY MASS INDEX: 35.94 KG/M2 | DIASTOLIC BLOOD PRESSURE: 84 MMHG | WEIGHT: 229 LBS | SYSTOLIC BLOOD PRESSURE: 134 MMHG | TEMPERATURE: 98 F | HEIGHT: 67 IN

## 2024-08-14 DIAGNOSIS — C61 PROSTATE CANCER: Primary | ICD-10-CM

## 2024-08-14 PROCEDURE — 63600175 PHARM REV CODE 636 W HCPCS: Mod: JZ,JG | Performed by: NURSE PRACTITIONER

## 2024-08-14 PROCEDURE — 96402 CHEMO HORMON ANTINEOPL SQ/IM: CPT

## 2024-08-14 RX ADMIN — LEUPROLIDE ACETATE 22.5 MG: KIT at 01:08

## 2024-10-18 ENCOUNTER — HOSPITAL ENCOUNTER (OUTPATIENT)
Dept: RADIOLOGY | Facility: HOSPITAL | Age: 64
Discharge: HOME | End: 2024-10-18
Attending: NURSE PRACTITIONER
Payer: COMMERCIAL

## 2024-10-18 DIAGNOSIS — R97.20 ELEVATED PSA, LESS THAN 10 NG/ML: ICD-10-CM

## 2024-10-18 DIAGNOSIS — C61 PROSTATE CANCER: ICD-10-CM

## 2024-10-18 DIAGNOSIS — Z79.818 ENCOUNTER FOR MONITORING ANDROGEN DEPRIVATION THERAPY: ICD-10-CM

## 2024-10-18 DIAGNOSIS — C79.51 MALIGNANT NEOPLASM METASTATIC TO BONE: ICD-10-CM

## 2024-10-18 PROCEDURE — A9596 HC GALLIUM GA-68 GOZETOTIDE, DX (ILLUCCIX), PER 1 MCI: HCPCS | Mod: TB | Performed by: NURSE PRACTITIONER

## 2024-10-18 PROCEDURE — 78815 PET IMAGE W/CT SKULL-THIGH: CPT | Mod: TC

## 2024-10-18 RX ADMIN — KIT FOR THE PREPARATION OF GALLIUM GA 68 GOZETOTIDE INJECTION 5.5 MILLICURIE: KIT INTRAVENOUS at 09:10

## 2024-10-18 NOTE — PROGRESS NOTES
Subjective:       Patient ID: Paz Dunne is a 63 y.o. male.    Chief Complaint: No chief complaint on file.      Referring Physician:  Dr Marino  PCP:  Dr. Sina Fenton     Diagnosis:  Hormone refractory prostate cancer-- c/w biochemical recurrence in Sept 2014, with subsequent suspicious bone mets in right shoulder in March 2017.    MRI findings were equivocal but patient had increased pain and was seen by ortho, with high degree of suspicion for mets.  Underwent palliative radiation.   h/o cT3b [place] 8 Adenocarcinoma of the prostate dx in 8/13 status post biopsy; Baseline PSA of 56.3-- put on [facility] Clinical trial with androgen deprivation preoperatively followed by radical prostatectomy done March 10, 2014, which showed extracapsular extension, seminal vesicle invasion, no lymph node invasion, no lymphovascular invasion, but with positive surgical margins.   He then completed external beam radiation therapy in August 2014.  Gynecomastia with tender bilateral breast tissue in the subareolar/periareolar region noted by the patient in early June 2015--> intermittent.   'Benign' osteosclerotic lesions read by radiology of CT from 11/11/16 at the left acetabulum, right proximal femur, and right ilium near the SI joint.    Stable on f/u CT/bone scans 2/24/17  DEXA on 3/22/19 noted osteopenia of both left/right femoral neck      Current Treatment:   Lupron 22.5 mg IM every 3 months--restarted July 2015.    Xtandi 160 mg p.o. daily started early April 2017  Prolia Q6M started 3/2019     Treatment History:  Preoperative androgen deprivation therapy with LHRH agonist given at [facility] at the time of diagnosis in August 2013.  Radical prostatectomy done March 10, 2014 showing extracapsular extension, seminal vesicle invasion, no lymph node or lymphovascular invasion, and positive surgical margins.  Biochemical recurrence of disease noted September 2014 with negative radiographic findings--> was restarted on Lupron  along with Casodex at that time.  Lupron last given September 2014.  Was discontinued in March 2015 with Casodex continued on.    Casodex started September 2014 discontinued July 2015 secondary to breast tenderness  Casodex 50 mg po daily 11/22/16--discontinued after 3 doses secondary to severe shortness of breath  Palliative radiation to the right shoulder ×10 treatments completed from 08/21/2017 through 09/01/2017.  SABR to the pelvic lymph node from 06/22/2023 through 07/03/2023.    HPI:   Patient with a history of locally advanced prostate cancer who underwent definitive surgical resection followed by adjuvant radiation.  He then had a short course of Lupron for biochemical recurrence in September of 2014. In March of 2015, he was having increasing side effects from Lupron, switched to Casodex alone.  Scans in November of 2016 showed no definitive evidence of disease.  Due to rising PSA, he underwent scans in February 2017, the showed stable subcentimeter liver lesions and stable punctate sclerotic lesions in the bony pelvis.  Due to shoulder pain, an MRI of the right shoulder was done on 03/20 4th 2017 that showed altered bone marrow signal involving the glenoid and inferior scapular spine with mild bone marrow edema, review by Dr. Carpenter was thought that this was a metastatic bone lesion.  He was started on Xtandi in April of 2017.  He then received 10 fractions of radiation from 08/21/2017 through 09/01/2017 to the right shoulder.  He has been undergoing routine surveillance including PSAs and scans.  He had an increase in his PSA and a CT scan along with bone scan on 03/11/2022 that showed no evidence of disease.  PSMA scan was done on 7 6th 22, this revealed sclerotic changes in the right scapula with moderate uptake representing known metastatic disease and no other suspicious uptake.  PSMA PET/CT scan done on 06/02/2023 revealed suspicious 5 mm hypermetabolic right external iliac lymph node and a right  scapular osseous metastatic lesion.  Patient underwent SABR, completed on 07/03/2023.  PSMA PET/CT scan done on 10/06/2023 revealed slight decreased SUV uptake within a right scapular osseous metastasis and a subcentimeter right iliac chain lymph node with no new foci of disease present.    Most recent PSMA PET/CT scan done on 10/18/2024 due to slowly increasing PSA showed mixed response with right iliac chain node not well visualized with a decrease in radio tracer accumulation however the right scapular lesion has increased in size.    Interval History:   Patient in clinic today for scheduled follow up for metastatic prostate cancer.  Overall, he reports compliance and continues to tolerate treatment.  He has been having some right shoulder pain.  He also states that when he gets up too fast, he has a dizzy spell.  No other major issues.      Past Medical History:   Diagnosis Date    Bone metastases     Dysphagia     Elevated PSA     Hiatal hernia     Hypertension     no meds    Inflammatory bowel disease     diverticulitis    Prostate cancer     Restless leg       Past Surgical History:   Procedure Laterality Date    APPENDECTOMY      CARPAL TUNNEL RELEASE  11/2021    COLONOSCOPY      CYSTOSCOPY      HAND SURGERY Left     laparoscopic hiatal repair with mesh and Toupet fundoplication      open prostatectomy      PENILE PROSTHESIS IMPLANT      PROSTATE SURGERY      positive bx    SHOULDER SURGERY Right 04/27/2021    SKIN CANCER EXCISION      WRIST SURGERY      plate     Social History     Socioeconomic History    Marital status:    Tobacco Use    Smoking status: Former     Types: Cigarettes    Smokeless tobacco: Never   Substance and Sexual Activity    Alcohol use: No     Comment: social    Drug use: No    Sexual activity: Yes     Partners: Female     Social Drivers of Health     Financial Resource Strain: Unknown (9/4/2022)    [place] from [facility] [place] [facility] [place] [facility]    Overall Financial Resource Strain  (CARDIA)     Difficulty of Paying Living Expenses: Patient declined   Food Insecurity: Unknown (9/4/2022)    [place] [facility] [place] [facility] [place] [facility]    Hunger Vital Sign     Worried About Running Out of Food in the Last Year: Patient declined     Ran Out of Food in the Last Year: Patient declined   Transportation Needs: Unknown (9/4/2022)    [place] [facility] [place] [facility] [place] [facility]    PRAPARE - Transportation     Lack of Transportation (Medical): Patient declined     Lack of Transportation (Non-Medical): Patient declined   Physical Activity: Inactive (9/4/2022)    Received from [facility] [place] [facility]    Exercise Vital Sign     Days of Exercise per Week: 0 days     Minutes of Exercise per Session: 0 min   Stress: Stress Concern Present (9/4/2022)    Received from [employer] [facility] [place] [facility]    [employer] [place] [employer] - Occupational Stress Questionnaire     Feeling of Stress : To some extent   Housing Stability: Unknown (9/4/2022)    Received from [facility] [place] [facility] [place] [facility]    Housing Stability Vital Sign     Unable to Pay for Housing in the Last Year: Patient refused     Number of Places Lived in the Last Year: 1     Unstable Housing in the Last Year: No      Family History   Problem Relation Name Age of Onset    Cancer Paternal Grandfather skin cancer     Cancer Cousin pancreatic     Cancer Other pguncle/skin ca       Review of patient's allergies indicates:   Allergen Reactions    Clindamycin Anaphylaxis     Other reaction(s): Other (See Comments)  Pt states not an allergy, just cannot take this when taking his chemo medication      Ropinirole Other (See Comments)     Loss of consciousness  Other reaction(s): Faint, Hypotension  Other reaction(s): Other (See Comments)  Hypotension    Tetanus and diphther. tox (pf) Other (See Comments) and Anaphylaxis     hypotension    Tetanus vaccines and toxoid      Other reaction(s): Other (See Comments)  Hypotension    Tetanus toxoid       Review of Systems    Constitutional:  Negative for activity change, appetite change, fatigue, fever and unexpected weight change.   HENT:  Negative for mouth sores.    Eyes:  Negative for visual disturbance.   Respiratory:  Negative for cough and shortness of breath.    Cardiovascular:  Negative for chest pain and leg swelling.   Gastrointestinal:  Negative for abdominal distention, abdominal pain, anal bleeding, blood in stool and diarrhea.   Genitourinary:  Negative for frequency.   Musculoskeletal:  Negative for back pain.   Integumentary:  Negative for rash.   Allergic/Immunologic: Negative for frequent infections.   Neurological:  Negative for dizziness, weakness, light-headedness and headaches.   Hematological:  Negative for adenopathy.   Psychiatric/Behavioral:  The patient is not nervous/anxious.          Objective:      Physical Exam  Vitals reviewed.   Constitutional:       General: He is awake.      Appearance: Normal appearance.   HENT:      Head: Normocephalic and atraumatic.      Right Ear: Hearing normal.      Left Ear: Hearing normal.      Nose: Nose normal.   Eyes:      General: Lids are normal. Vision grossly intact.      Extraocular Movements: Extraocular movements intact.      Conjunctiva/sclera: Conjunctivae normal.   Cardiovascular:      Rate and Rhythm: Normal rate and regular rhythm.      Pulses: Normal pulses.      Heart sounds: Normal heart sounds.   Pulmonary:      Effort: Pulmonary effort is normal.      Breath sounds: Normal breath sounds. No wheezing, rhonchi or rales.   Abdominal:      General: Bowel sounds are normal.      Palpations: Abdomen is soft.      Tenderness: There is no abdominal tenderness.   Musculoskeletal:      Cervical back: Full passive range of motion without pain.      Right lower leg: No edema.      Left lower leg: No edema.      Comments: Left hand with amputations of his 1st 4 fingers up to the knuckles due to trauma, only thumb remaining   Skin:     General: Skin is warm.    Neurological:      General: No focal deficit present.      Mental Status: He is alert and oriented to person, place, and time.   Psychiatric:         Attention and Perception: Attention normal.         Mood and Affect: Mood and affect normal.         Behavior: Behavior is cooperative.         LABS REVIEWED IN [facility]          Assessment:     Hormone refractory prostate cancer-- c/w biochemical recurrence in Sept 2014, with subsequent suspicious bone mets in right shoulder in March 2017.    MRI findings were equivocal but patient had increased pain and was seen by ortho, with high degree of suspicion for mets.  Underwent palliative radiation.   h/o cT3b [place] 8 Adenocarcinoma of the prostate dx in 8/13 status post biopsy; Baseline PSA of 56.3-- put on [facility] Clinical trial with androgen deprivation preoperatively followed by radical prostatectomy done March 10, 2014, which showed extracapsular extension, seminal vesicle invasion, no lymph node invasion, no lymphovascular invasion, but with positive surgical margins.  He then completed external beam radiation therapy in August 2014.  Gynecomastia with tender bilateral breast tissue in the subareolar/periareolar region noted by the patient in early June 2015--> intermittent.   'Benign' osteosclerotic lesions read by radiology of CT from 11/11/16 at the left acetabulum, right proximal femur, and right ilium near the SI joint.    Stable on f/u CT/bone scans 2/24/17  DEXA on 3/22/19 noted osteopenia of both left/right femoral neck       Plan:       PSMA PET/CT scan done on 10/06/2023 shows improving disease with no evidence of new disease.    Plan to continue Xtandi 160 mg p.o. daily for now    Continue Lupron every 3 months    Continue Prolia every 6 months; DEXA done 02/20/2023 showing osteopenia.    Most recent PSMA PET/CT scan done on 10/18/2024 due to slowly increasing PSA showed increase activity in the right scapula.    I will refer him back to radiation oncology       RTC in 4-6 weeks with labs prior: CBC, CMP, PSA.      All questions were answered to the best of my ability and the patient understands the plan moving forward.      Nhan Payne II, MD

## 2024-10-21 ENCOUNTER — OFFICE VISIT (OUTPATIENT)
Dept: HEMATOLOGY/ONCOLOGY | Facility: CLINIC | Age: 64
End: 2024-10-21
Payer: COMMERCIAL

## 2024-10-21 VITALS
SYSTOLIC BLOOD PRESSURE: 127 MMHG | RESPIRATION RATE: 18 BRPM | WEIGHT: 235.44 LBS | DIASTOLIC BLOOD PRESSURE: 78 MMHG | TEMPERATURE: 99 F | OXYGEN SATURATION: 97 % | HEART RATE: 77 BPM | BODY MASS INDEX: 36.88 KG/M2

## 2024-10-21 DIAGNOSIS — C79.51 MALIGNANT NEOPLASM METASTATIC TO BONE: ICD-10-CM

## 2024-10-21 DIAGNOSIS — C61 MALIGNANT NEOPLASM OF PROSTATE: Primary | ICD-10-CM

## 2024-10-21 PROCEDURE — 1159F MED LIST DOCD IN RCRD: CPT | Mod: CPTII,S$GLB,, | Performed by: INTERNAL MEDICINE

## 2024-10-21 PROCEDURE — 3074F SYST BP LT 130 MM HG: CPT | Mod: CPTII,S$GLB,, | Performed by: INTERNAL MEDICINE

## 2024-10-21 PROCEDURE — 99214 OFFICE O/P EST MOD 30 MIN: CPT | Mod: S$GLB,,, | Performed by: INTERNAL MEDICINE

## 2024-10-21 PROCEDURE — 1160F RVW MEDS BY RX/DR IN RCRD: CPT | Mod: CPTII,S$GLB,, | Performed by: INTERNAL MEDICINE

## 2024-10-21 PROCEDURE — 3078F DIAST BP <80 MM HG: CPT | Mod: CPTII,S$GLB,, | Performed by: INTERNAL MEDICINE

## 2024-10-21 PROCEDURE — 3008F BODY MASS INDEX DOCD: CPT | Mod: CPTII,S$GLB,, | Performed by: INTERNAL MEDICINE

## 2024-10-21 PROCEDURE — 99999 PR PBB SHADOW E&M-EST. PATIENT-LVL V: CPT | Mod: PBBFAC,,, | Performed by: INTERNAL MEDICINE

## 2024-10-21 PROCEDURE — 4010F ACE/ARB THERAPY RXD/TAKEN: CPT | Mod: CPTII,S$GLB,, | Performed by: INTERNAL MEDICINE

## 2024-11-14 ENCOUNTER — LAB VISIT (OUTPATIENT)
Dept: LAB | Facility: HOSPITAL | Age: 64
End: 2024-11-14
Attending: INTERNAL MEDICINE
Payer: COMMERCIAL

## 2024-11-14 ENCOUNTER — INFUSION (OUTPATIENT)
Dept: INFUSION THERAPY | Facility: HOSPITAL | Age: 64
End: 2024-11-14
Attending: FAMILY MEDICINE
Payer: COMMERCIAL

## 2024-11-14 VITALS
DIASTOLIC BLOOD PRESSURE: 85 MMHG | TEMPERATURE: 98 F | HEART RATE: 75 BPM | OXYGEN SATURATION: 95 % | SYSTOLIC BLOOD PRESSURE: 143 MMHG | RESPIRATION RATE: 20 BRPM

## 2024-11-14 DIAGNOSIS — C79.51 MALIGNANT NEOPLASM METASTATIC TO BONE: ICD-10-CM

## 2024-11-14 DIAGNOSIS — C61 MALIGNANT NEOPLASM OF PROSTATE: ICD-10-CM

## 2024-11-14 DIAGNOSIS — C61 PROSTATE CANCER: Primary | ICD-10-CM

## 2024-11-14 LAB
ALBUMIN SERPL-MCNC: 3.4 G/DL (ref 3.4–4.8)
ALBUMIN/GLOB SERPL: 1 RATIO (ref 1.1–2)
ALP SERPL-CCNC: 115 UNIT/L (ref 40–150)
ALT SERPL-CCNC: 16 UNIT/L (ref 0–55)
ANION GAP SERPL CALC-SCNC: 5 MEQ/L
AST SERPL-CCNC: 17 UNIT/L (ref 5–34)
BASOPHILS # BLD AUTO: 0.04 X10(3)/MCL
BASOPHILS NFR BLD AUTO: 0.7 %
BILIRUB SERPL-MCNC: 0.2 MG/DL
BUN SERPL-MCNC: 15.4 MG/DL (ref 8.4–25.7)
CALCIUM SERPL-MCNC: 9.4 MG/DL (ref 8.8–10)
CHLORIDE SERPL-SCNC: 103 MMOL/L (ref 98–107)
CO2 SERPL-SCNC: 33 MMOL/L (ref 23–31)
CREAT SERPL-MCNC: 1 MG/DL (ref 0.72–1.25)
CREAT/UREA NIT SERPL: 15
EOSINOPHIL # BLD AUTO: 0.2 X10(3)/MCL (ref 0–0.9)
EOSINOPHIL NFR BLD AUTO: 3.5 %
ERYTHROCYTE [DISTWIDTH] IN BLOOD BY AUTOMATED COUNT: 11.8 % (ref 11.5–17)
GFR SERPLBLD CREATININE-BSD FMLA CKD-EPI: >60 ML/MIN/1.73/M2
GLOBULIN SER-MCNC: 3.3 GM/DL (ref 2.4–3.5)
GLUCOSE SERPL-MCNC: 105 MG/DL (ref 82–115)
HCT VFR BLD AUTO: 42.1 % (ref 42–52)
HGB BLD-MCNC: 13.5 G/DL (ref 14–18)
IMM GRANULOCYTES # BLD AUTO: 0.03 X10(3)/MCL (ref 0–0.04)
IMM GRANULOCYTES NFR BLD AUTO: 0.5 %
LYMPHOCYTES # BLD AUTO: 1.03 X10(3)/MCL (ref 0.6–4.6)
LYMPHOCYTES NFR BLD AUTO: 17.9 %
MCH RBC QN AUTO: 28.8 PG (ref 27–31)
MCHC RBC AUTO-ENTMCNC: 32.1 G/DL (ref 33–36)
MCV RBC AUTO: 90 FL (ref 80–94)
MONOCYTES # BLD AUTO: 0.58 X10(3)/MCL (ref 0.1–1.3)
MONOCYTES NFR BLD AUTO: 10.1 %
NEUTROPHILS # BLD AUTO: 3.87 X10(3)/MCL (ref 2.1–9.2)
NEUTROPHILS NFR BLD AUTO: 67.3 %
PLATELET # BLD AUTO: 206 X10(3)/MCL (ref 130–400)
PMV BLD AUTO: 8.6 FL (ref 7.4–10.4)
POTASSIUM SERPL-SCNC: 4.6 MMOL/L (ref 3.5–5.1)
PROT SERPL-MCNC: 6.7 GM/DL (ref 5.8–7.6)
PSA SERPL-MCNC: 4.64 NG/ML
RBC # BLD AUTO: 4.68 X10(6)/MCL (ref 4.7–6.1)
SODIUM SERPL-SCNC: 141 MMOL/L (ref 136–145)
WBC # BLD AUTO: 5.75 X10(3)/MCL (ref 4.5–11.5)

## 2024-11-14 PROCEDURE — 36415 COLL VENOUS BLD VENIPUNCTURE: CPT

## 2024-11-14 PROCEDURE — 85025 COMPLETE CBC W/AUTO DIFF WBC: CPT

## 2024-11-14 PROCEDURE — 84153 ASSAY OF PSA TOTAL: CPT

## 2024-11-14 PROCEDURE — 63600175 PHARM REV CODE 636 W HCPCS: Mod: JZ,JG | Performed by: NURSE PRACTITIONER

## 2024-11-14 PROCEDURE — 96372 THER/PROPH/DIAG INJ SC/IM: CPT | Mod: 59

## 2024-11-14 PROCEDURE — 96401 CHEMO ANTI-NEOPL SQ/IM: CPT

## 2024-11-14 PROCEDURE — 63600175 PHARM REV CODE 636 W HCPCS: Mod: JZ,JG | Performed by: INTERNAL MEDICINE

## 2024-11-14 PROCEDURE — 80053 COMPREHEN METABOLIC PANEL: CPT

## 2024-11-14 RX ADMIN — LEUPROLIDE ACETATE 22.5 MG: KIT at 01:11

## 2024-11-14 RX ADMIN — DENOSUMAB 60 MG: 60 INJECTION SUBCUTANEOUS at 01:11

## 2024-11-26 ENCOUNTER — OFFICE VISIT (OUTPATIENT)
Dept: HEMATOLOGY/ONCOLOGY | Facility: CLINIC | Age: 64
End: 2024-11-26
Payer: COMMERCIAL

## 2024-11-26 VITALS
BODY MASS INDEX: 37.67 KG/M2 | OXYGEN SATURATION: 98 % | HEART RATE: 72 BPM | HEIGHT: 67 IN | WEIGHT: 240 LBS | DIASTOLIC BLOOD PRESSURE: 91 MMHG | RESPIRATION RATE: 18 BRPM | SYSTOLIC BLOOD PRESSURE: 153 MMHG

## 2024-11-26 DIAGNOSIS — C61 MALIGNANT NEOPLASM OF PROSTATE: Primary | ICD-10-CM

## 2024-11-26 DIAGNOSIS — C79.51 MALIGNANT NEOPLASM METASTATIC TO BONE: ICD-10-CM

## 2024-11-26 DIAGNOSIS — Z79.818 ANDROGEN DEPRIVATION THERAPY: ICD-10-CM

## 2024-11-26 PROCEDURE — 99999 PR PBB SHADOW E&M-EST. PATIENT-LVL IV: CPT | Mod: PBBFAC,,, | Performed by: NURSE PRACTITIONER

## 2024-11-26 PROCEDURE — 99214 OFFICE O/P EST MOD 30 MIN: CPT | Mod: S$GLB,,, | Performed by: NURSE PRACTITIONER

## 2024-11-26 PROCEDURE — 1160F RVW MEDS BY RX/DR IN RCRD: CPT | Mod: CPTII,S$GLB,, | Performed by: NURSE PRACTITIONER

## 2024-11-26 PROCEDURE — 3080F DIAST BP >= 90 MM HG: CPT | Mod: CPTII,S$GLB,, | Performed by: NURSE PRACTITIONER

## 2024-11-26 PROCEDURE — 3008F BODY MASS INDEX DOCD: CPT | Mod: CPTII,S$GLB,, | Performed by: NURSE PRACTITIONER

## 2024-11-26 PROCEDURE — 3077F SYST BP >= 140 MM HG: CPT | Mod: CPTII,S$GLB,, | Performed by: NURSE PRACTITIONER

## 2024-11-26 PROCEDURE — 4010F ACE/ARB THERAPY RXD/TAKEN: CPT | Mod: CPTII,S$GLB,, | Performed by: NURSE PRACTITIONER

## 2024-11-26 PROCEDURE — 1159F MED LIST DOCD IN RCRD: CPT | Mod: CPTII,S$GLB,, | Performed by: NURSE PRACTITIONER

## 2024-11-26 NOTE — PROGRESS NOTES
Subjective:       Patient ID: Paz Dunne is a 64 y.o. male.    Chief Complaint: Follow-up (Patient reports fatigue )      Referring Physician:  Dr Marino  PCP:  Dr. Sina Fenton     Diagnosis:  Hormone refractory prostate cancer-- c/w biochemical recurrence in Sept 2014, with subsequent suspicious bone mets in right shoulder in March 2017.    MRI findings were equivocal but patient had increased pain and was seen by ortho, with high degree of suspicion for mets.  Underwent palliative radiation.   h/o cT3b [facility] Adenocarcinoma of the prostate dx in 8/13 status post biopsy; Baseline PSA of 56.3-- put on [facility] Clinical trial with androgen deprivation preoperatively followed by radical prostatectomy done March 10, 2014, which showed extracapsular extension, seminal vesicle invasion, no lymph node invasion, no lymphovascular invasion, but with positive surgical margins.   He then completed external beam radiation therapy in August 2014.  Gynecomastia with tender bilateral breast tissue in the subareolar/periareolar region noted by the patient in early June 2015--> intermittent.   'Benign' osteosclerotic lesions read by radiology of CT from 11/11/16 at the left acetabulum, right proximal femur, and right ilium near the SI joint.    Stable on f/u CT/bone scans 2/24/17  DEXA on 3/22/19 noted osteopenia of both left/right femoral neck      Current Treatment:   Lupron 22.5 mg IM every 3 months--restarted July 2015.    Xtandi 160 mg p.o. daily started early April 2017  Prolia Q6M started 3/2019     Treatment History:  Preoperative androgen deprivation therapy with LHRH agonist given at [facility] at the time of diagnosis in August 2013.  Radical prostatectomy done March 10, 2014 showing extracapsular extension, seminal vesicle invasion, no lymph node or lymphovascular invasion, and positive surgical margins.  Biochemical recurrence of disease noted September 2014 with negative radiographic findings--> was restarted  on Lupron along with Casodex at that time.  Lupron last given September 2014.  Was discontinued in March 2015 with Casodex continued on.    Casodex started September 2014 discontinued July 2015 secondary to breast tenderness  Casodex 50 mg po daily 11/22/16--discontinued after 3 doses secondary to severe shortness of breath  Palliative radiation to the right shoulder ×10 treatments completed from 08/21/2017 through 09/01/2017.  SABR to the pelvic lymph node from 06/22/2023 through 07/03/2023.    HPI:   Patient with a history of locally advanced prostate cancer who underwent definitive surgical resection followed by adjuvant radiation.  He then had a short course of Lupron for biochemical recurrence in September of 2014. In March of 2015, he was having increasing side effects from Lupron, switched to Casodex alone.  Scans in November of 2016 showed no definitive evidence of disease.  Due to rising PSA, he underwent scans in February 2017, the showed stable subcentimeter liver lesions and stable punctate sclerotic lesions in the bony pelvis.  Due to shoulder pain, an MRI of the right shoulder was done on 03/20 4th 2017 that showed altered bone marrow signal involving the glenoid and inferior scapular spine with mild bone marrow edema, review by Dr. Carpenter was thought that this was a metastatic bone lesion.  He was started on Xtandi in April of 2017.  He then received 10 fractions of radiation from 08/21/2017 through 09/01/2017 to the right shoulder.  He has been undergoing routine surveillance including PSAs and scans.  He had an increase in his PSA and a CT scan along with bone scan on 03/11/2022 that showed no evidence of disease.  PSMA scan was done on 7 6th 22, this revealed sclerotic changes in the right scapula with moderate uptake representing known metastatic disease and no other suspicious uptake.  PSMA PET/CT scan done on 06/02/2023 revealed suspicious 5 mm hypermetabolic right external iliac lymph node and a  right scapular osseous metastatic lesion.  Patient underwent SABR, completed on 07/03/2023.  PSMA PET/CT scan done on 10/06/2023 revealed slight decreased SUV uptake within a right scapular osseous metastasis and a subcentimeter right iliac chain lymph node with no new foci of disease present.    Most recent PSMA PET/CT scan done on 10/18/2024 due to slowly increasing PSA showed mixed response with right iliac chain node not well visualized with a decrease in radio tracer accumulation however the right scapular lesion has increased in size.    Interval History:   Patient in clinic today for scheduled follow up for metastatic prostate cancer.  Overall, he reports compliance with Xtandi and continues to tolerate treatment.  He recently completed radiation to the right scapular lesion.  He states that the pain has improved but he still has some soreness to that area.  He denies any new complaints.    Past Medical History:   Diagnosis Date    Bone metastases     Dysphagia     Elevated PSA     Hiatal hernia     Hypertension     no meds    Inflammatory bowel disease     diverticulitis    Prostate cancer     Restless leg       Past Surgical History:   Procedure Laterality Date    APPENDECTOMY      CARPAL TUNNEL RELEASE  11/2021    COLONOSCOPY      CYSTOSCOPY      HAND SURGERY Left     laparoscopic hiatal repair with mesh and Toupet fundoplication      open prostatectomy      PENILE PROSTHESIS IMPLANT      PROSTATE SURGERY      positive bx    SHOULDER SURGERY Right 04/27/2021    SKIN CANCER EXCISION      WRIST SURGERY      plate     Social History     Socioeconomic History    Marital status:    Tobacco Use    Smoking status: Former     Types: Cigarettes    Smokeless tobacco: Never   Substance and Sexual Activity    Alcohol use: No     Comment: social    Drug use: No    Sexual activity: Yes     Partners: Female     Social Drivers of Health     Financial Resource Strain: Unknown (9/4/2022)    Received from [facility],  [facility] [employer]    Overall Financial Resource Strain (CARDIA)     Difficulty of Paying Living Expenses: Patient declined   Food Insecurity: Unknown (9/4/2022)    Received from [facility] [employer]    Hunger Vital Sign     Worried About Running Out of Food in the Last Year: Patient declined     Ran Out of Food in the Last Year: Patient declined   Transportation Needs: Unknown (9/4/2022)    Received from [facility]    PRAPARE - Transportation     Lack of Transportation (Medical): Patient declined     Lack of Transportation (Non-Medical): Patient declined   Physical Activity: Inactive (9/4/2022)    Received from [facility]    Exercise Vital Sign     Days of Exercise per Week: 0 days     Minutes of Exercise per Session: 0 min   Stress: Stress Concern Present (9/4/2022)    Received from [facility]    [employer] - Occupational Stress Questionnaire     Feeling of Stress : To some extent   Housing Stability: Unknown (9/4/2022)    Received from [facility]    Housing Stability Vital Sign     Unable to Pay for Housing in the Last Year: Patient refused     Number of Places Lived in the Last Year: 1     Unstable Housing in the Last Year: No      Family History   Problem Relation Name Age of Onset    Cancer Paternal Grandfather skin cancer     Cancer Cousin pancreatic     Cancer Other pguncle/skin ca       Review of patient's allergies indicates:   Allergen Reactions    Clindamycin Anaphylaxis     Other reaction(s): Other (See Comments)  Pt states not an allergy, just cannot take this when taking his chemo medication      Ropinirole Other (See Comments)     Loss of consciousness  Other reaction(s): Faint, Hypotension  Other reaction(s): Other (See Comments)  Hypotension    Tetanus and diphther. tox (pf) Other (See Comments) and Anaphylaxis     hypotension    Tetanus vaccines and toxoid      Other reaction(s): Other (See Comments)  Hypotension     Tetanus toxoid       Review of Systems   Constitutional:  Negative for activity change, appetite change, fatigue, fever and unexpected weight change.   HENT:  Negative for mouth sores.    Eyes:  Negative for visual disturbance.   Respiratory:  Negative for cough and shortness of breath.    Cardiovascular:  Negative for chest pain and leg swelling.   Gastrointestinal:  Negative for abdominal distention, abdominal pain, anal bleeding, blood in stool and diarrhea.   Genitourinary:  Negative for frequency.   Musculoskeletal:  Negative for back pain.   Integumentary:  Negative for rash.   Allergic/Immunologic: Negative for frequent infections.   Neurological:  Negative for dizziness, weakness, light-headedness and headaches.   Hematological:  Negative for adenopathy.   Psychiatric/Behavioral:  The patient is not nervous/anxious.          Objective:      Physical Exam  Vitals reviewed.   Constitutional:       General: He is awake.      Appearance: Normal appearance.   HENT:      Head: Normocephalic and atraumatic.      Right Ear: Hearing normal.      Left Ear: Hearing normal.      Nose: Nose normal.   Eyes:      General: Lids are normal. Vision grossly intact.      Extraocular Movements: Extraocular movements intact.      Conjunctiva/sclera: Conjunctivae normal.   Cardiovascular:      Rate and Rhythm: Normal rate and regular rhythm.      Pulses: Normal pulses.      Heart sounds: Normal heart sounds.   Pulmonary:      Effort: Pulmonary effort is normal.      Breath sounds: Normal breath sounds. No wheezing, rhonchi or rales.   Abdominal:      General: Bowel sounds are normal.      Palpations: Abdomen is soft.      Tenderness: There is no abdominal tenderness.   Musculoskeletal:      Cervical back: Full passive range of motion without pain.      Right lower leg: No edema.      Left lower leg: No edema.      Comments: Left hand with amputations of his 1st 4 fingers up to the knuckles due to trauma, only thumb remaining    Skin:     General: Skin is warm.   Neurological:      General: No focal deficit present.      Mental Status: He is alert and oriented to person, place, and time.   Psychiatric:         Attention and Perception: Attention normal.         Mood and Affect: Mood and affect normal.         Behavior: Behavior is cooperative.         LABS REVIEWED IN [facility]          Assessment:     Hormone refractory prostate cancer-- c/w biochemical recurrence in Sept 2014, with subsequent suspicious bone mets in right shoulder in March 2017.    MRI findings were equivocal but patient had increased pain and was seen by ortho, with high degree of suspicion for mets.  Underwent palliative radiation.   h/o cT3b [facility] Adenocarcinoma of the prostate dx in 8/13 status post biopsy; Baseline PSA of 56.3-- put on [facility] Clinical trial with androgen deprivation preoperatively followed by radical prostatectomy done March 10, 2014, which showed extracapsular extension, seminal vesicle invasion, no lymph node invasion, no lymphovascular invasion, but with positive surgical margins.  He then completed external beam radiation therapy in August 2014.  Gynecomastia with tender bilateral breast tissue in the subareolar/periareolar region noted by the patient in early June 2015--> intermittent.   'Benign' osteosclerotic lesions read by radiology of CT from 11/11/16 at the left acetabulum, right proximal femur, and right ilium near the SI joint.    Stable on f/u CT/bone scans 2/24/17  DEXA on 3/22/19 noted osteopenia of both left/right femoral neck       Plan:       PSMA PET/CT scan done on 10/06/2023 shows improving disease with no evidence of new disease.    Plan to continue Xtandi 160 mg p.o. daily for now    Continue Lupron every 3 months    Continue Prolia every 6 months; DEXA done 02/20/2023 showing osteopenia.    Most recent PSMA PET/CT scan done on 10/18/2024 due to slowly increasing PSA showed increase activity in the right scapula.    Status post  radiation to the right scapula.  We will request records from [facility]      RT in 6 weeks with labs prior: CBC, CMP, PSA.      All questions were answered to the best of my ability and the patient understands the plan moving forward.      FANI Pang-C

## 2024-12-30 ENCOUNTER — LAB VISIT (OUTPATIENT)
Dept: LAB | Facility: HOSPITAL | Age: 64
End: 2024-12-30
Attending: INTERNAL MEDICINE
Payer: COMMERCIAL

## 2024-12-30 DIAGNOSIS — Z79.818 ANDROGEN DEPRIVATION THERAPY: ICD-10-CM

## 2024-12-30 DIAGNOSIS — C61 MALIGNANT NEOPLASM OF PROSTATE: ICD-10-CM

## 2024-12-30 DIAGNOSIS — C79.51 MALIGNANT NEOPLASM METASTATIC TO BONE: ICD-10-CM

## 2024-12-30 LAB
ALBUMIN SERPL-MCNC: 3.7 G/DL (ref 3.4–4.8)
ALBUMIN/GLOB SERPL: 1.2 RATIO (ref 1.1–2)
ALP SERPL-CCNC: 104 UNIT/L (ref 40–150)
ALT SERPL-CCNC: 14 UNIT/L (ref 0–55)
ANION GAP SERPL CALC-SCNC: 8 MEQ/L
AST SERPL-CCNC: 17 UNIT/L (ref 5–34)
BASOPHILS # BLD AUTO: 0.04 X10(3)/MCL
BASOPHILS NFR BLD AUTO: 1.1 %
BILIRUB SERPL-MCNC: 0.4 MG/DL
BUN SERPL-MCNC: 15.2 MG/DL (ref 8.4–25.7)
CALCIUM SERPL-MCNC: 9.2 MG/DL (ref 8.8–10)
CHLORIDE SERPL-SCNC: 102 MMOL/L (ref 98–107)
CO2 SERPL-SCNC: 28 MMOL/L (ref 23–31)
CREAT SERPL-MCNC: 1.04 MG/DL (ref 0.72–1.25)
CREAT/UREA NIT SERPL: 15
EOSINOPHIL # BLD AUTO: 0.1 X10(3)/MCL (ref 0–0.9)
EOSINOPHIL NFR BLD AUTO: 2.8 %
ERYTHROCYTE [DISTWIDTH] IN BLOOD BY AUTOMATED COUNT: 12 % (ref 11.5–17)
GFR SERPLBLD CREATININE-BSD FMLA CKD-EPI: >60 ML/MIN/1.73/M2
GLOBULIN SER-MCNC: 3.1 GM/DL (ref 2.4–3.5)
GLUCOSE SERPL-MCNC: 96 MG/DL (ref 82–115)
HCT VFR BLD AUTO: 43.8 % (ref 42–52)
HGB BLD-MCNC: 14.1 G/DL (ref 14–18)
IMM GRANULOCYTES # BLD AUTO: 0.01 X10(3)/MCL (ref 0–0.04)
IMM GRANULOCYTES NFR BLD AUTO: 0.3 %
LYMPHOCYTES # BLD AUTO: 0.5 X10(3)/MCL (ref 0.6–4.6)
LYMPHOCYTES NFR BLD AUTO: 14.1 %
MCH RBC QN AUTO: 29.4 PG (ref 27–31)
MCHC RBC AUTO-ENTMCNC: 32.2 G/DL (ref 33–36)
MCV RBC AUTO: 91.3 FL (ref 80–94)
MONOCYTES # BLD AUTO: 0.52 X10(3)/MCL (ref 0.1–1.3)
MONOCYTES NFR BLD AUTO: 14.6 %
NEUTROPHILS # BLD AUTO: 2.38 X10(3)/MCL (ref 2.1–9.2)
NEUTROPHILS NFR BLD AUTO: 67.1 %
PLATELET # BLD AUTO: 170 X10(3)/MCL (ref 130–400)
PMV BLD AUTO: 8.5 FL (ref 7.4–10.4)
POTASSIUM SERPL-SCNC: 4.4 MMOL/L (ref 3.5–5.1)
PROT SERPL-MCNC: 6.8 GM/DL (ref 5.8–7.6)
PSA SERPL-MCNC: 1.11 NG/ML
RBC # BLD AUTO: 4.8 X10(6)/MCL (ref 4.7–6.1)
SODIUM SERPL-SCNC: 138 MMOL/L (ref 136–145)
WBC # BLD AUTO: 3.55 X10(3)/MCL (ref 4.5–11.5)

## 2024-12-30 PROCEDURE — 85025 COMPLETE CBC W/AUTO DIFF WBC: CPT

## 2024-12-30 PROCEDURE — 80053 COMPREHEN METABOLIC PANEL: CPT

## 2024-12-30 PROCEDURE — 84153 ASSAY OF PSA TOTAL: CPT

## 2024-12-30 PROCEDURE — 36415 COLL VENOUS BLD VENIPUNCTURE: CPT

## 2025-01-07 ENCOUNTER — OFFICE VISIT (OUTPATIENT)
Dept: HEMATOLOGY/ONCOLOGY | Facility: CLINIC | Age: 65
End: 2025-01-07
Payer: COMMERCIAL

## 2025-01-07 VITALS
OXYGEN SATURATION: 98 % | WEIGHT: 242.06 LBS | DIASTOLIC BLOOD PRESSURE: 94 MMHG | SYSTOLIC BLOOD PRESSURE: 141 MMHG | HEART RATE: 71 BPM | BODY MASS INDEX: 37.99 KG/M2 | HEIGHT: 67 IN | TEMPERATURE: 98 F | RESPIRATION RATE: 16 BRPM

## 2025-01-07 DIAGNOSIS — Z79.818 ENCOUNTER FOR MONITORING ANDROGEN DEPRIVATION THERAPY: ICD-10-CM

## 2025-01-07 DIAGNOSIS — C61 PROSTATE CANCER: Primary | ICD-10-CM

## 2025-01-07 DIAGNOSIS — C79.51 MALIGNANT NEOPLASM METASTATIC TO BONE: ICD-10-CM

## 2025-01-07 PROCEDURE — 3080F DIAST BP >= 90 MM HG: CPT | Mod: CPTII,S$GLB,, | Performed by: NURSE PRACTITIONER

## 2025-01-07 PROCEDURE — 99214 OFFICE O/P EST MOD 30 MIN: CPT | Mod: S$GLB,,, | Performed by: NURSE PRACTITIONER

## 2025-01-07 PROCEDURE — 99999 PR PBB SHADOW E&M-EST. PATIENT-LVL V: CPT | Mod: PBBFAC,,, | Performed by: NURSE PRACTITIONER

## 2025-01-07 PROCEDURE — 3008F BODY MASS INDEX DOCD: CPT | Mod: CPTII,S$GLB,, | Performed by: NURSE PRACTITIONER

## 2025-01-07 PROCEDURE — 3077F SYST BP >= 140 MM HG: CPT | Mod: CPTII,S$GLB,, | Performed by: NURSE PRACTITIONER

## 2025-01-07 PROCEDURE — 1159F MED LIST DOCD IN RCRD: CPT | Mod: CPTII,S$GLB,, | Performed by: NURSE PRACTITIONER

## 2025-01-07 PROCEDURE — 1160F RVW MEDS BY RX/DR IN RCRD: CPT | Mod: CPTII,S$GLB,, | Performed by: NURSE PRACTITIONER

## 2025-01-07 NOTE — PROGRESS NOTES
Subjective:       Patient ID: Paz Dunne is a 64 y.o. male.    Chief Complaint: Follow-up (Patient without complaints today)      Referring Physician:  Dr Marino  PCP:  Dr. Sina Fenton     Diagnosis:  Hormone refractory prostate cancer-- c/w biochemical recurrence in Sept 2014, with subsequent suspicious bone mets in right shoulder in March 2017.    MRI findings were equivocal but patient had increased pain and was seen by ortho, with high degree of suspicion for mets.  Underwent palliative radiation.   h/o cT3b [facility] Adenocarcinoma of the prostate dx in 8/13 status post biopsy; Baseline PSA of 56.3-- put on [facility] Clinical trial with androgen deprivation preoperatively followed by radical prostatectomy done March 10, 2014, which showed extracapsular extension, seminal vesicle invasion, no lymph node invasion, no lymphovascular invasion, but with positive surgical margins.   He then completed external beam radiation therapy in August 2014.  Gynecomastia with tender bilateral breast tissue in the subareolar/periareolar region noted by the patient in early June 2015--> intermittent.   'Benign' osteosclerotic lesions read by radiology of CT from 11/11/16 at the left acetabulum, right proximal femur, and right ilium near the SI joint.    Stable on f/u CT/bone scans 2/24/17  DEXA on 3/22/19 noted osteopenia of both left/right femoral neck      Current Treatment:   Lupron 22.5 mg IM every 3 months--restarted July 2015.    Xtandi 160 mg p.o. daily started early April 2017  Prolia Q6M started 3/2019     Treatment History:  Preoperative androgen deprivation therapy with LHRH agonist given at [facility] at the time of diagnosis in August 2013.  Radical prostatectomy done March 10, 2014 showing extracapsular extension, seminal vesicle invasion, no lymph node or lymphovascular invasion, and positive surgical margins.  Biochemical recurrence of disease noted September 2014 with negative radiographic findings--> was  restarted on Lupron along with Casodex at that time.  Lupron last given September 2014.  Was discontinued in March 2015 with Casodex continued on.    Casodex started September 2014 discontinued July 2015 secondary to breast tenderness  Casodex 50 mg po daily 11/22/16--discontinued after 3 doses secondary to severe shortness of breath  Palliative radiation to the right shoulder ×10 treatments completed from 08/21/2017 through 09/01/2017.  SABR to the pelvic lymph node from 06/22/2023 through 07/03/2023.    HPI:   Patient with a history of locally advanced prostate cancer who underwent definitive surgical resection followed by adjuvant radiation.  He then had a short course of Lupron for biochemical recurrence in September of 2014. In March of 2015, he was having increasing side effects from Lupron, switched to Casodex alone.  Scans in November of 2016 showed no definitive evidence of disease.  Due to rising PSA, he underwent scans in February 2017, the showed stable subcentimeter liver lesions and stable punctate sclerotic lesions in the bony pelvis.  Due to shoulder pain, an MRI of the right shoulder was done on 03/20 4th 2017 that showed altered bone marrow signal involving the glenoid and inferior scapular spine with mild bone marrow edema, review by Dr. Carpenter was thought that this was a metastatic bone lesion.  He was started on Xtandi in April of 2017.  He then received 10 fractions of radiation from 08/21/2017 through 09/01/2017 to the right shoulder.  He has been undergoing routine surveillance including PSAs and scans.  He had an increase in his PSA and a CT scan along with bone scan on 03/11/2022 that showed no evidence of disease.  PSMA scan was done on 7 6th 22, this revealed sclerotic changes in the right scapula with moderate uptake representing known metastatic disease and no other suspicious uptake.  PSMA PET/CT scan done on 06/02/2023 revealed suspicious 5 mm hypermetabolic right external iliac lymph  node and a right scapular osseous metastatic lesion.  Patient underwent SABR, completed on 07/03/2023.  PSMA PET/CT scan done on 10/06/2023 revealed slight decreased SUV uptake within a right scapular osseous metastasis and a subcentimeter right iliac chain lymph node with no new foci of disease present.    Most recent PSMA PET/CT scan done on 10/18/2024 due to slowly increasing PSA showed mixed response with right iliac chain node not well visualized with a decrease in radio tracer accumulation however the right scapular lesion has increased in size.    Interval History:   Patient in clinic today for scheduled follow up for metastatic prostate cancer.  Overall, he reports compliance with Xtandi and continues to tolerate treatment.  He feels good overall and he denies any new complaints.  He denies any complaints pain, bowel difficulties, shortness of breath.    Past Medical History:   Diagnosis Date    Bone metastases     Dysphagia     Elevated PSA     Hiatal hernia     Hypertension     no meds    Inflammatory bowel disease     diverticulitis    Prostate cancer     Restless leg       Past Surgical History:   Procedure Laterality Date    APPENDECTOMY      CARPAL TUNNEL RELEASE  11/2021    COLONOSCOPY      CYSTOSCOPY      HAND SURGERY Left     laparoscopic hiatal repair with mesh and Toupet fundoplication      open prostatectomy      PENILE PROSTHESIS IMPLANT      PROSTATE SURGERY      positive bx    SHOULDER SURGERY Right 04/27/2021    SKIN CANCER EXCISION      WRIST SURGERY      plate     Social History     Socioeconomic History    Marital status:    Tobacco Use    Smoking status: Former     Types: Cigarettes    Smokeless tobacco: Never   Substance and Sexual Activity    Alcohol use: No     Comment: social    Drug use: No    Sexual activity: Yes     Partners: Female     Social Drivers of Health     Financial Resource Strain: Unknown (9/4/2022)    Received from [facility] [place] [place] [employer], [facility] [place] [place] [employer]    Overall Financial  Resource Strain (CARDIA)     Difficulty of Paying Living Expenses: Patient declined   Food Insecurity: Unknown (9/4/2022)    Received from [facility] [employer] [facility] [employer]    Hunger Vital Sign     Worried About Running Out of Food in the Last Year: Patient declined     Ran Out of Food in the Last Year: Patient declined   Transportation Needs: Unknown (9/4/2022)    Received from [facility] [place] [employer] [facility]    PRAPARE - Transportation     Lack of Transportation (Medical): Patient declined     Lack of Transportation (Non-Medical): Patient declined   Physical Activity: Inactive (9/4/2022)    Received from [facility] [place] [employer] [facility]    Exercise Vital Sign     Days of Exercise per Week: 0 days     Minutes of Exercise per Session: 0 min   Stress: Stress Concern Present (9/4/2022)    Received from [facility] [place] [employer] [facility] [employer]    [employer] - Occupational Stress Questionnaire     Feeling of Stress : To some extent   Housing Stability: Unknown (9/4/2022)    Received from [facility] [place] [employer] [facility]    Housing Stability Vital Sign     Unable to Pay for Housing in the Last Year: Patient refused     Number of Places Lived in the Last Year: 1     Unstable Housing in the Last Year: No      Family History   Problem Relation Name Age of Onset    Cancer Paternal Grandfather skin cancer     Cancer Cousin pancreatic     Cancer Other pguncle/skin ca       Review of patient's allergies indicates:   Allergen Reactions    Clindamycin Anaphylaxis     Other reaction(s): Other (See Comments)  Pt states not an allergy, just cannot take this when taking his chemo medication      Ropinirole Other (See Comments)     Loss of consciousness  Other reaction(s): Faint, Hypotension  Other reaction(s): Other (See Comments)  Hypotension    Tetanus and diphther. tox (pf) Other (See Comments) and Anaphylaxis     hypotension    Tetanus vaccines and toxoid      Other reaction(s): Other (See Comments)  Hypotension    Tetanus toxoid       Review of  Systems   Constitutional:  Negative for activity change, appetite change, fatigue, fever and unexpected weight change.   HENT:  Negative for mouth sores.    Eyes:  Negative for visual disturbance.   Respiratory:  Negative for cough and shortness of breath.    Cardiovascular:  Negative for chest pain and leg swelling.   Gastrointestinal:  Negative for abdominal distention, abdominal pain, anal bleeding, blood in stool and diarrhea.   Genitourinary:  Negative for frequency.   Musculoskeletal:  Negative for back pain.   Integumentary:  Negative for rash.   Allergic/Immunologic: Negative for frequent infections.   Neurological:  Negative for dizziness, weakness, light-headedness and headaches.   Hematological:  Negative for adenopathy.   Psychiatric/Behavioral:  The patient is not nervous/anxious.          Objective:      Physical Exam  Vitals reviewed.   Constitutional:       General: He is awake.      Appearance: Normal appearance.   HENT:      Head: Normocephalic and atraumatic.      Right Ear: Hearing normal.      Left Ear: Hearing normal.      Nose: Nose normal.   Eyes:      General: Lids are normal. Vision grossly intact.      Extraocular Movements: Extraocular movements intact.      Conjunctiva/sclera: Conjunctivae normal.   Cardiovascular:      Rate and Rhythm: Normal rate and regular rhythm.      Pulses: Normal pulses.      Heart sounds: Normal heart sounds.   Pulmonary:      Effort: Pulmonary effort is normal.      Breath sounds: Normal breath sounds. No wheezing, rhonchi or rales.   Musculoskeletal:      Cervical back: Full passive range of motion without pain.      Right lower leg: No edema.      Left lower leg: No edema.      Comments: Left hand with amputations of his 1st 4 fingers up to the knuckles due to trauma, only thumb remaining   Skin:     General: Skin is warm.   Neurological:      General: No focal deficit present.      Mental Status: He is alert and oriented to person, place, and time.    Psychiatric:         Attention and Perception: Attention normal.         Mood and Affect: Mood and affect normal.         Behavior: Behavior is cooperative.         LABS REVIEWED IN [facility]          Assessment:     Hormone refractory prostate cancer-- c/w biochemical recurrence in Sept 2014, with subsequent suspicious bone mets in right shoulder in March 2017.    MRI findings were equivocal but patient had increased pain and was seen by ortho, with high degree of suspicion for mets.  Underwent palliative radiation.   h/o cT3b [facility] Adenocarcinoma of the prostate dx in 8/13 status post biopsy; Baseline PSA of 56.3-- put on [facility] Clinical trial with androgen deprivation preoperatively followed by radical prostatectomy done March 10, 2014, which showed extracapsular extension, seminal vesicle invasion, no lymph node invasion, no lymphovascular invasion, but with positive surgical margins.  He then completed external beam radiation therapy in August 2014.  Gynecomastia with tender bilateral breast tissue in the subareolar/periareolar region noted by the patient in early June 2015--> intermittent.   'Benign' osteosclerotic lesions read by radiology of CT from 11/11/16 at the left acetabulum, right proximal femur, and right ilium near the SI joint.    Stable on f/u CT/bone scans 2/24/17  DEXA on 3/22/19 noted osteopenia of both left/right femoral neck       Plan:       PSMA PET/CT scan done on 10/06/2023 shows improving disease with no evidence of new disease.    Plan to continue Xtandi 160 mg p.o. daily for now    Continue Lupron every 3 months    Continue Prolia every 6 months; DEXA done 02/20/2023 showing osteopenia.    Most recent PSMA PET/CT scan done on 10/18/2024 due to slowly increasing PSA showed increase activity in the right scapula.    Status post radiation to the right scapula.  We will request records from [facility]      RT in 3 months with labs prior: CBC, CMP, PSA.      All questions were  answered to the best of my ability and the patient understands the plan moving forward.      Vaishali Waite, TIESHAP-C

## 2025-02-19 ENCOUNTER — INFUSION (OUTPATIENT)
Dept: INFUSION THERAPY | Facility: HOSPITAL | Age: 65
End: 2025-02-19
Attending: FAMILY MEDICINE
Payer: COMMERCIAL

## 2025-02-19 VITALS — HEART RATE: 73 BPM | TEMPERATURE: 98 F | DIASTOLIC BLOOD PRESSURE: 68 MMHG | SYSTOLIC BLOOD PRESSURE: 137 MMHG

## 2025-02-19 DIAGNOSIS — C61 PROSTATE CANCER: Primary | ICD-10-CM

## 2025-02-19 PROCEDURE — 96402 CHEMO HORMON ANTINEOPL SQ/IM: CPT

## 2025-02-19 PROCEDURE — 63600175 PHARM REV CODE 636 W HCPCS: Mod: JZ,TB | Performed by: NURSE PRACTITIONER

## 2025-02-19 RX ADMIN — LEUPROLIDE ACETATE 22.5 MG: KIT at 01:02

## 2025-04-03 DIAGNOSIS — C61 PROSTATE CANCER: Primary | ICD-10-CM

## 2025-04-03 DIAGNOSIS — C79.51 MALIGNANT NEOPLASM METASTATIC TO BONE: ICD-10-CM

## 2025-04-07 ENCOUNTER — LAB VISIT (OUTPATIENT)
Dept: LAB | Facility: HOSPITAL | Age: 65
End: 2025-04-07
Attending: NURSE PRACTITIONER
Payer: COMMERCIAL

## 2025-04-07 DIAGNOSIS — C79.51 MALIGNANT NEOPLASM METASTATIC TO BONE: ICD-10-CM

## 2025-04-07 DIAGNOSIS — C61 PROSTATE CANCER: ICD-10-CM

## 2025-04-07 LAB
ALBUMIN SERPL-MCNC: 3.7 G/DL (ref 3.4–4.8)
ALBUMIN/GLOB SERPL: 1.1 RATIO (ref 1.1–2)
ALP SERPL-CCNC: 81 UNIT/L (ref 40–150)
ALT SERPL-CCNC: 13 UNIT/L (ref 0–55)
ANION GAP SERPL CALC-SCNC: 8 MEQ/L
AST SERPL-CCNC: 15 UNIT/L (ref 11–45)
BASOPHILS # BLD AUTO: 0.04 X10(3)/MCL
BASOPHILS NFR BLD AUTO: 0.7 %
BILIRUB SERPL-MCNC: 0.3 MG/DL
BUN SERPL-MCNC: 17 MG/DL (ref 8.4–25.7)
CALCIUM SERPL-MCNC: 9.4 MG/DL (ref 8.8–10)
CHLORIDE SERPL-SCNC: 101 MMOL/L (ref 98–107)
CO2 SERPL-SCNC: 30 MMOL/L (ref 23–31)
CREAT SERPL-MCNC: 0.94 MG/DL (ref 0.72–1.25)
CREAT/UREA NIT SERPL: 18
EOSINOPHIL # BLD AUTO: 0.17 X10(3)/MCL (ref 0–0.9)
EOSINOPHIL NFR BLD AUTO: 3.1 %
ERYTHROCYTE [DISTWIDTH] IN BLOOD BY AUTOMATED COUNT: 11.7 % (ref 11.5–17)
GFR SERPLBLD CREATININE-BSD FMLA CKD-EPI: >60 ML/MIN/1.73/M2
GLOBULIN SER-MCNC: 3.5 GM/DL (ref 2.4–3.5)
GLUCOSE SERPL-MCNC: 93 MG/DL (ref 82–115)
HCT VFR BLD AUTO: 44.5 % (ref 42–52)
HGB BLD-MCNC: 14.7 G/DL (ref 14–18)
IMM GRANULOCYTES # BLD AUTO: 0.01 X10(3)/MCL (ref 0–0.04)
IMM GRANULOCYTES NFR BLD AUTO: 0.2 %
LYMPHOCYTES # BLD AUTO: 1.01 X10(3)/MCL (ref 0.6–4.6)
LYMPHOCYTES NFR BLD AUTO: 18.7 %
MCH RBC QN AUTO: 29.6 PG (ref 27–31)
MCHC RBC AUTO-ENTMCNC: 33 G/DL (ref 33–36)
MCV RBC AUTO: 89.7 FL (ref 80–94)
MONOCYTES # BLD AUTO: 0.44 X10(3)/MCL (ref 0.1–1.3)
MONOCYTES NFR BLD AUTO: 8.1 %
NEUTROPHILS # BLD AUTO: 3.73 X10(3)/MCL (ref 2.1–9.2)
NEUTROPHILS NFR BLD AUTO: 69.2 %
PLATELET # BLD AUTO: 225 X10(3)/MCL (ref 130–400)
PMV BLD AUTO: 9 FL (ref 7.4–10.4)
POTASSIUM SERPL-SCNC: 4.9 MMOL/L (ref 3.5–5.1)
PROT SERPL-MCNC: 7.2 GM/DL (ref 5.8–7.6)
PSA SERPL-MCNC: 0.44 NG/ML
RBC # BLD AUTO: 4.96 X10(6)/MCL (ref 4.7–6.1)
SODIUM SERPL-SCNC: 139 MMOL/L (ref 136–145)
WBC # BLD AUTO: 5.4 X10(3)/MCL (ref 4.5–11.5)

## 2025-04-07 PROCEDURE — 85025 COMPLETE CBC W/AUTO DIFF WBC: CPT

## 2025-04-07 PROCEDURE — 36415 COLL VENOUS BLD VENIPUNCTURE: CPT

## 2025-04-07 PROCEDURE — 80053 COMPREHEN METABOLIC PANEL: CPT

## 2025-04-07 PROCEDURE — 84153 ASSAY OF PSA TOTAL: CPT

## 2025-04-21 ENCOUNTER — OFFICE VISIT (OUTPATIENT)
Dept: HEMATOLOGY/ONCOLOGY | Facility: CLINIC | Age: 65
End: 2025-04-21
Payer: COMMERCIAL

## 2025-04-21 VITALS
WEIGHT: 244 LBS | RESPIRATION RATE: 18 BRPM | SYSTOLIC BLOOD PRESSURE: 126 MMHG | BODY MASS INDEX: 38.3 KG/M2 | DIASTOLIC BLOOD PRESSURE: 83 MMHG | HEART RATE: 77 BPM | HEIGHT: 67 IN | OXYGEN SATURATION: 99 %

## 2025-04-21 DIAGNOSIS — C79.51 MALIGNANT NEOPLASM METASTATIC TO BONE: ICD-10-CM

## 2025-04-21 DIAGNOSIS — Z79.818 ENCOUNTER FOR MONITORING ANDROGEN DEPRIVATION THERAPY: ICD-10-CM

## 2025-04-21 DIAGNOSIS — C61 MALIGNANT NEOPLASM OF PROSTATE: Primary | ICD-10-CM

## 2025-04-21 PROCEDURE — 3074F SYST BP LT 130 MM HG: CPT | Mod: CPTII,S$GLB,, | Performed by: NURSE PRACTITIONER

## 2025-04-21 PROCEDURE — 99999 PR PBB SHADOW E&M-EST. PATIENT-LVL V: CPT | Mod: PBBFAC,,, | Performed by: NURSE PRACTITIONER

## 2025-04-21 PROCEDURE — 1160F RVW MEDS BY RX/DR IN RCRD: CPT | Mod: CPTII,S$GLB,, | Performed by: NURSE PRACTITIONER

## 2025-04-21 PROCEDURE — 1159F MED LIST DOCD IN RCRD: CPT | Mod: CPTII,S$GLB,, | Performed by: NURSE PRACTITIONER

## 2025-04-21 PROCEDURE — 3008F BODY MASS INDEX DOCD: CPT | Mod: CPTII,S$GLB,, | Performed by: NURSE PRACTITIONER

## 2025-04-21 PROCEDURE — 99214 OFFICE O/P EST MOD 30 MIN: CPT | Mod: S$GLB,,, | Performed by: NURSE PRACTITIONER

## 2025-04-21 PROCEDURE — 3079F DIAST BP 80-89 MM HG: CPT | Mod: CPTII,S$GLB,, | Performed by: NURSE PRACTITIONER

## 2025-04-21 PROCEDURE — G2211 COMPLEX E/M VISIT ADD ON: HCPCS | Mod: S$GLB,,, | Performed by: NURSE PRACTITIONER

## 2025-04-21 RX ORDER — GABAPENTIN 300 MG/1
300-600 CAPSULE ORAL NIGHTLY PRN
COMMUNITY
Start: 2025-02-24

## 2025-04-21 NOTE — PROGRESS NOTES
Subjective:       Patient ID: Paz Dunne is a 64 y.o. male.    Chief Complaint: Follow-up (Patient reports frequent headaches )      Referring Physician:  Dr Marino  PCP:  Dr. Sina Fenton     Diagnosis:  Hormone refractory prostate cancer-- c/w biochemical recurrence in Sept 2014, with subsequent suspicious bone mets in right shoulder in March 2017.    MRI findings were equivocal but patient had increased pain and was seen by ortho, with high degree of suspicion for mets.  Underwent palliative radiation.   h/o cT3b [place] 8 Adenocarcinoma of the prostate dx in 8/13 status post biopsy; Baseline PSA of 56.3-- put on [facility] Clinical trial with androgen deprivation preoperatively followed by radical prostatectomy done March 10, 2014, which showed extracapsular extension, seminal vesicle invasion, no lymph node invasion, no lymphovascular invasion, but with positive surgical margins.   He then completed external beam radiation therapy in August 2014.  Gynecomastia with tender bilateral breast tissue in the subareolar/periareolar region noted by the patient in early June 2015--> intermittent.   'Benign' osteosclerotic lesions read by radiology of CT from 11/11/16 at the left acetabulum, right proximal femur, and right ilium near the SI joint.    Stable on f/u CT/bone scans 2/24/17  DEXA on 3/22/19 noted osteopenia of both left/right femoral neck      Current Treatment:   Lupron 22.5 mg IM every 3 months--restarted July 2015.    Xtandi 160 mg p.o. daily started early April 2017  Prolia Q6M started 3/2019     Treatment History:  Preoperative androgen deprivation therapy with LHRH agonist given at [facility] at the time of diagnosis in August 2013.  Radical prostatectomy done March 10, 2014 showing extracapsular extension, seminal vesicle invasion, no lymph node or lymphovascular invasion, and positive surgical margins.  Biochemical recurrence of disease noted September 2014 with negative radiographic findings--> was  restarted on Lupron along with Casodex at that time.  Lupron last given September 2014.  Was discontinued in March 2015 with Casodex continued on.    Casodex started September 2014 discontinued July 2015 secondary to breast tenderness  Casodex 50 mg po daily 11/22/16--discontinued after 3 doses secondary to severe shortness of breath  Palliative radiation to the right shoulder ×10 treatments completed from 08/21/2017 through 09/01/2017.  SABR to the pelvic lymph node from 06/22/2023 through 07/03/2023.    HPI:   Patient with a history of locally advanced prostate cancer who underwent definitive surgical resection followed by adjuvant radiation.  He then had a short course of Lupron for biochemical recurrence in September of 2014. In March of 2015, he was having increasing side effects from Lupron, switched to Casodex alone.  Scans in November of 2016 showed no definitive evidence of disease.  Due to rising PSA, he underwent scans in February 2017, the showed stable subcentimeter liver lesions and stable punctate sclerotic lesions in the bony pelvis.  Due to shoulder pain, an MRI of the right shoulder was done on 03/20 4th 2017 that showed altered bone marrow signal involving the glenoid and inferior scapular spine with mild bone marrow edema, review by Dr. Carpenter was thought that this was a metastatic bone lesion.  He was started on Xtandi in April of 2017.  He then received 10 fractions of radiation from 08/21/2017 through 09/01/2017 to the right shoulder.  He has been undergoing routine surveillance including PSAs and scans.  He had an increase in his PSA and a CT scan along with bone scan on 03/11/2022 that showed no evidence of disease.  PSMA scan was done on 7 6th 22, this revealed sclerotic changes in the right scapula with moderate uptake representing known metastatic disease and no other suspicious uptake.  PSMA PET/CT scan done on 06/02/2023 revealed suspicious 5 mm hypermetabolic right external iliac lymph  node and a right scapular osseous metastatic lesion.  Patient underwent SABR, completed on 07/03/2023.  PSMA PET/CT scan done on 10/06/2023 revealed slight decreased SUV uptake within a right scapular osseous metastasis and a subcentimeter right iliac chain lymph node with no new foci of disease present.    Most recent PSMA PET/CT scan done on 10/18/2024 due to slowly increasing PSA showed mixed response with right iliac chain node not well visualized with a decrease in radio tracer accumulation however the right scapular lesion has increased in size.    Interval History:   Patient in clinic today for scheduled follow up for metastatic prostate cancer.  Overall, he reports compliance with Xtandi and continues to tolerate treatment.  He feels good overall and he denies any new complaints.  He denies any complaints pain, bowel difficulties, shortness of breath.    Past Medical History:   Diagnosis Date    Bone metastases     Dysphagia     Elevated PSA     Hiatal hernia     Hypertension     no meds    Inflammatory bowel disease     diverticulitis    Prostate cancer     Restless leg       Past Surgical History:   Procedure Laterality Date    APPENDECTOMY      CARPAL TUNNEL RELEASE  11/2021    COLONOSCOPY      CYSTOSCOPY      HAND SURGERY Left     laparoscopic hiatal repair with mesh and Toupet fundoplication      open prostatectomy      PENILE PROSTHESIS IMPLANT      PROSTATE SURGERY      positive bx    SHOULDER SURGERY Right 04/27/2021    SKIN CANCER EXCISION      WRIST SURGERY      plate     Social History     Socioeconomic History    Marital status:    Tobacco Use    Smoking status: Former     Types: Cigarettes    Smokeless tobacco: Never   Substance and Sexual Activity    Alcohol use: No     Comment: social    Drug use: No    Sexual activity: Yes     Partners: Female     Social Drivers of Health     Financial Resource Strain: Unknown (9/4/2022)    Received from [facility]    Overall Financial Resource Strain  (CARDIA)     Difficulty of Paying Living Expenses: Patient declined   Food Insecurity: Unknown (9/4/2022)    Received from [facility]    Hunger Vital Sign     Worried About Running Out of Food in the Last Year: Patient declined     Ran Out of Food in the Last Year: Patient declined   Transportation Needs: Unknown (9/4/2022)    Received from [facility]    PRAPARE - Transportation     Lack of Transportation (Medical): Patient declined     Lack of Transportation (Non-Medical): Patient declined   Physical Activity: Inactive (9/4/2022)    Received from [facility]    Exercise Vital Sign     Days of Exercise per Week: 0 days     Minutes of Exercise per Session: 0 min   Stress: Stress Concern Present (9/4/2022)    Received from [facility]    [employer] - Occupational Stress Questionnaire     Feeling of Stress : To some extent   Housing Stability: Unknown (9/4/2022)    Received from [facility]    Housing Stability Vital Sign     Unable to Pay for Housing in the Last Year: Patient refused     Number of Places Lived in the Last Year: 1     Unstable Housing in the Last Year: No      Family History   Problem Relation Name Age of Onset    Cancer Paternal Grandfather skin cancer     Cancer Cousin pancreatic     Cancer Other pguncle/skin ca       Review of patient's allergies indicates:   Allergen Reactions    Clindamycin Anaphylaxis     Other reaction(s): Other (See Comments)  Pt states not an allergy, just cannot take this when taking his chemo medication      Ropinirole Other (See Comments)     Loss of consciousness  Other reaction(s): Faint, Hypotension  Other reaction(s): Other (See Comments)  Hypotension    Tetanus and diphther. tox (pf) Other (See Comments) and Anaphylaxis     hypotension    Tetanus vaccines and toxoid      Other reaction(s): Other (See Comments)  Hypotension    Tetanus toxoid       Review of Systems   Constitutional:  Negative for activity change, appetite change, fatigue,  fever and unexpected weight change.   HENT:  Negative for mouth sores.    Eyes:  Negative for visual disturbance.   Respiratory:  Negative for cough and shortness of breath.    Cardiovascular:  Negative for chest pain and leg swelling.   Gastrointestinal:  Negative for abdominal distention, abdominal pain, anal bleeding, blood in stool and diarrhea.   Genitourinary:  Negative for frequency.   Musculoskeletal:  Negative for back pain.   Integumentary:  Negative for rash.   Allergic/Immunologic: Negative for frequent infections.   Neurological:  Negative for dizziness, weakness, light-headedness and headaches.   Hematological:  Negative for adenopathy.   Psychiatric/Behavioral:  The patient is not nervous/anxious.          Objective:      Physical Exam  Vitals reviewed.   Constitutional:       General: He is awake.      Appearance: Normal appearance.   HENT:      Head: Normocephalic and atraumatic.      Right Ear: Hearing normal.      Left Ear: Hearing normal.      Nose: Nose normal.   Eyes:      General: Lids are normal. Vision grossly intact.      Extraocular Movements: Extraocular movements intact.      Conjunctiva/sclera: Conjunctivae normal.   Cardiovascular:      Rate and Rhythm: Normal rate and regular rhythm.      Pulses: Normal pulses.      Heart sounds: Normal heart sounds.   Pulmonary:      Effort: Pulmonary effort is normal.      Breath sounds: Normal breath sounds. No wheezing, rhonchi or rales.   Musculoskeletal:      Cervical back: Full passive range of motion without pain.      Right lower leg: No edema.      Left lower leg: No edema.      Comments: Left hand with amputations of his 1st 4 fingers up to the knuckles due to trauma, only thumb remaining   Lymphadenopathy:      Cervical: No cervical adenopathy.      Upper Body:      Right upper body: No supraclavicular or axillary adenopathy.      Left upper body: No supraclavicular or axillary adenopathy.   Skin:     General: Skin is warm.   Neurological:       General: No focal deficit present.      Mental Status: He is alert and oriented to person, place, and time.   Psychiatric:         Attention and Perception: Attention normal.         Mood and Affect: Mood and affect normal.         Behavior: Behavior is cooperative.         LABS REVIEWED IN [facility]          Assessment:     Hormone refractory prostate cancer-- c/w biochemical recurrence in Sept 2014, with subsequent suspicious bone mets in right shoulder in March 2017.    MRI findings were equivocal but patient had increased pain and was seen by ortho, with high degree of suspicion for mets.  Underwent palliative radiation.   h/o cT3b [place] 8 Adenocarcinoma of the prostate dx in 8/13 status post biopsy; Baseline PSA of 56.3-- put on [facility] Clinical trial with androgen deprivation preoperatively followed by radical prostatectomy done March 10, 2014, which showed extracapsular extension, seminal vesicle invasion, no lymph node invasion, no lymphovascular invasion, but with positive surgical margins.  He then completed external beam radiation therapy in August 2014.  Gynecomastia with tender bilateral breast tissue in the subareolar/periareolar region noted by the patient in early June 2015--> intermittent.   'Benign' osteosclerotic lesions read by radiology of CT from 11/11/16 at the left acetabulum, right proximal femur, and right ilium near the SI joint.    Stable on f/u CT/bone scans 2/24/17  DEXA on 3/22/19 noted osteopenia of both left/right femoral neck       Plan:       PSMA PET/CT scan done on 10/06/2023 shows improving disease with no evidence of new disease.    Plan to continue Xtandi 160 mg p.o. daily for now    Continue Lupron every 3 months    Continue Prolia every 6 months; DEXA done 02/20/2023 showing osteopenia.    Most recent PSMA PET/CT scan done on 10/18/2024 due to slowly increasing PSA showed increase activity in the right scapula.    Status post radiation to the right scapula.  We will request  records from [facility] in 3 months with labs prior: CBC, CMP, PSA.      All questions were answered to the best of my ability and the patient understands the plan moving forward.      Vaishali Waite, FNP-C    Visit today included increased complexity associated with the care of the episodic problem Prostate cancer, addressing and managing the longitudinal care of the patient's prostate cancer.

## 2025-05-14 ENCOUNTER — INFUSION (OUTPATIENT)
Dept: INFUSION THERAPY | Facility: HOSPITAL | Age: 65
End: 2025-05-14
Attending: FAMILY MEDICINE
Payer: COMMERCIAL

## 2025-05-14 VITALS
RESPIRATION RATE: 18 BRPM | SYSTOLIC BLOOD PRESSURE: 120 MMHG | TEMPERATURE: 98 F | DIASTOLIC BLOOD PRESSURE: 83 MMHG | HEART RATE: 75 BPM

## 2025-05-14 DIAGNOSIS — C79.51 MALIGNANT NEOPLASM METASTATIC TO BONE: ICD-10-CM

## 2025-05-14 DIAGNOSIS — C61 PROSTATE CANCER: Primary | ICD-10-CM

## 2025-05-14 PROCEDURE — 96402 CHEMO HORMON ANTINEOPL SQ/IM: CPT

## 2025-05-14 PROCEDURE — 96372 THER/PROPH/DIAG INJ SC/IM: CPT | Mod: 59

## 2025-05-14 PROCEDURE — 63600175 PHARM REV CODE 636 W HCPCS: Mod: JZ,TB | Performed by: INTERNAL MEDICINE

## 2025-05-14 RX ADMIN — LEUPROLIDE ACETATE 22.5 MG: KIT at 01:05

## 2025-05-14 RX ADMIN — DENOSUMAB 60 MG: 60 INJECTION SUBCUTANEOUS at 01:05

## 2025-07-16 ENCOUNTER — LAB VISIT (OUTPATIENT)
Dept: LAB | Facility: HOSPITAL | Age: 65
End: 2025-07-16
Attending: NURSE PRACTITIONER
Payer: COMMERCIAL

## 2025-07-16 DIAGNOSIS — C79.51 MALIGNANT NEOPLASM METASTATIC TO BONE: ICD-10-CM

## 2025-07-16 DIAGNOSIS — C61 MALIGNANT NEOPLASM OF PROSTATE: ICD-10-CM

## 2025-07-16 DIAGNOSIS — Z79.818 ENCOUNTER FOR MONITORING ANDROGEN DEPRIVATION THERAPY: ICD-10-CM

## 2025-07-16 LAB
ALBUMIN SERPL-MCNC: 3.6 G/DL (ref 3.4–4.8)
ALBUMIN/GLOB SERPL: 0.9 RATIO (ref 1.1–2)
ALP SERPL-CCNC: 103 UNIT/L (ref 40–150)
ALT SERPL-CCNC: 17 UNIT/L (ref 0–55)
ANION GAP SERPL CALC-SCNC: 8 MEQ/L
AST SERPL-CCNC: 18 UNIT/L (ref 11–45)
BASOPHILS # BLD AUTO: 0.03 X10(3)/MCL
BASOPHILS NFR BLD AUTO: 0.5 %
BILIRUB SERPL-MCNC: 0.3 MG/DL
BUN SERPL-MCNC: 16.2 MG/DL (ref 8.4–25.7)
CALCIUM SERPL-MCNC: 8.6 MG/DL (ref 8.8–10)
CHLORIDE SERPL-SCNC: 103 MMOL/L (ref 98–107)
CO2 SERPL-SCNC: 29 MMOL/L (ref 23–31)
CREAT SERPL-MCNC: 0.94 MG/DL (ref 0.72–1.25)
CREAT/UREA NIT SERPL: 17
EOSINOPHIL # BLD AUTO: 0.17 X10(3)/MCL (ref 0–0.9)
EOSINOPHIL NFR BLD AUTO: 2.9 %
ERYTHROCYTE [DISTWIDTH] IN BLOOD BY AUTOMATED COUNT: 12 % (ref 11.5–17)
GFR SERPLBLD CREATININE-BSD FMLA CKD-EPI: >60 ML/MIN/1.73/M2
GLOBULIN SER-MCNC: 3.8 GM/DL (ref 2.4–3.5)
GLUCOSE SERPL-MCNC: 92 MG/DL (ref 82–115)
HCT VFR BLD AUTO: 44 % (ref 42–52)
HGB BLD-MCNC: 14.2 G/DL (ref 14–18)
IMM GRANULOCYTES # BLD AUTO: 0.01 X10(3)/MCL (ref 0–0.04)
IMM GRANULOCYTES NFR BLD AUTO: 0.2 %
LYMPHOCYTES # BLD AUTO: 0.97 X10(3)/MCL (ref 0.6–4.6)
LYMPHOCYTES NFR BLD AUTO: 16.5 %
MCH RBC QN AUTO: 29.3 PG (ref 27–31)
MCHC RBC AUTO-ENTMCNC: 32.3 G/DL (ref 33–36)
MCV RBC AUTO: 90.7 FL (ref 80–94)
MONOCYTES # BLD AUTO: 0.49 X10(3)/MCL (ref 0.1–1.3)
MONOCYTES NFR BLD AUTO: 8.3 %
NEUTROPHILS # BLD AUTO: 4.21 X10(3)/MCL (ref 2.1–9.2)
NEUTROPHILS NFR BLD AUTO: 71.6 %
PLATELET # BLD AUTO: 218 X10(3)/MCL (ref 130–400)
PMV BLD AUTO: 8.8 FL (ref 7.4–10.4)
POTASSIUM SERPL-SCNC: 4.6 MMOL/L (ref 3.5–5.1)
PROT SERPL-MCNC: 7.4 GM/DL (ref 5.8–7.6)
PSA SERPL-MCNC: 1.14 NG/ML
RBC # BLD AUTO: 4.85 X10(6)/MCL (ref 4.7–6.1)
SODIUM SERPL-SCNC: 140 MMOL/L (ref 136–145)
WBC # BLD AUTO: 5.88 X10(3)/MCL (ref 4.5–11.5)

## 2025-07-16 PROCEDURE — 85025 COMPLETE CBC W/AUTO DIFF WBC: CPT

## 2025-07-16 PROCEDURE — 84153 ASSAY OF PSA TOTAL: CPT

## 2025-07-16 PROCEDURE — 80053 COMPREHEN METABOLIC PANEL: CPT

## 2025-07-16 PROCEDURE — 36415 COLL VENOUS BLD VENIPUNCTURE: CPT

## 2025-07-21 ENCOUNTER — OFFICE VISIT (OUTPATIENT)
Dept: HEMATOLOGY/ONCOLOGY | Facility: CLINIC | Age: 65
End: 2025-07-21
Payer: COMMERCIAL

## 2025-07-21 VITALS
BODY MASS INDEX: 38.06 KG/M2 | HEART RATE: 75 BPM | DIASTOLIC BLOOD PRESSURE: 82 MMHG | WEIGHT: 242.5 LBS | OXYGEN SATURATION: 99 % | RESPIRATION RATE: 18 BRPM | HEIGHT: 67 IN | SYSTOLIC BLOOD PRESSURE: 145 MMHG

## 2025-07-21 DIAGNOSIS — C79.51 MALIGNANT NEOPLASM METASTATIC TO BONE: ICD-10-CM

## 2025-07-21 DIAGNOSIS — C61 MALIGNANT NEOPLASM OF PROSTATE: Primary | ICD-10-CM

## 2025-07-21 DIAGNOSIS — Z79.818 ENCOUNTER FOR MONITORING ANDROGEN DEPRIVATION THERAPY: ICD-10-CM

## 2025-07-21 PROCEDURE — G2211 COMPLEX E/M VISIT ADD ON: HCPCS | Mod: S$GLB,,, | Performed by: NURSE PRACTITIONER

## 2025-07-21 PROCEDURE — 1159F MED LIST DOCD IN RCRD: CPT | Mod: CPTII,S$GLB,, | Performed by: NURSE PRACTITIONER

## 2025-07-21 PROCEDURE — 1160F RVW MEDS BY RX/DR IN RCRD: CPT | Mod: CPTII,S$GLB,, | Performed by: NURSE PRACTITIONER

## 2025-07-21 PROCEDURE — 3008F BODY MASS INDEX DOCD: CPT | Mod: CPTII,S$GLB,, | Performed by: NURSE PRACTITIONER

## 2025-07-21 PROCEDURE — 3079F DIAST BP 80-89 MM HG: CPT | Mod: CPTII,S$GLB,, | Performed by: NURSE PRACTITIONER

## 2025-07-21 PROCEDURE — 99999 PR PBB SHADOW E&M-EST. PATIENT-LVL IV: CPT | Mod: PBBFAC,,, | Performed by: NURSE PRACTITIONER

## 2025-07-21 PROCEDURE — 3077F SYST BP >= 140 MM HG: CPT | Mod: CPTII,S$GLB,, | Performed by: NURSE PRACTITIONER

## 2025-07-21 PROCEDURE — 99215 OFFICE O/P EST HI 40 MIN: CPT | Mod: S$GLB,,, | Performed by: NURSE PRACTITIONER

## 2025-07-21 RX ORDER — ROSUVASTATIN CALCIUM 10 MG/1
10 TABLET, COATED ORAL
COMMUNITY
Start: 2025-06-25

## 2025-07-21 NOTE — PROGRESS NOTES
Subjective:       Patient ID: Paz Dunne is a 64 y.o. male.    Chief Complaint: Follow-up (Patient has no concerns today )      Referring Physician:  Dr Marino  PCP:  Dr. Sina Fenton     Diagnosis:  Hormone refractory prostate cancer-- c/w biochemical recurrence in Sept 2014, with subsequent suspicious bone mets in right shoulder in March 2017.    MRI findings were equivocal but patient had increased pain and was seen by ortho, with high degree of suspicion for mets.  Underwent palliative radiation.   h/o cT3b [place] 8 Adenocarcinoma of the prostate dx in 8/13 status post biopsy; Baseline PSA of 56.3-- put on [facility] Clinical trial with androgen deprivation preoperatively followed by radical prostatectomy done March 10, 2014, which showed extracapsular extension, seminal vesicle invasion, no lymph node invasion, no lymphovascular invasion, but with positive surgical margins.   He then completed external beam radiation therapy in August 2014.  Gynecomastia with tender bilateral breast tissue in the subareolar/periareolar region noted by the patient in early June 2015--> intermittent.   'Benign' osteosclerotic lesions read by radiology of CT from 11/11/16 at the left acetabulum, right proximal femur, and right ilium near the SI joint.    Stable on f/u CT/bone scans 2/24/17  DEXA on 3/22/19 noted osteopenia of both left/right femoral neck      Current Treatment:   Lupron 22.5 mg IM every 3 months--restarted July 2015.    Xtandi 160 mg p.o. daily started early April 2017  Prolia Q6M started 3/2019     Treatment History:  Preoperative androgen deprivation therapy with LHRH agonist given at [facility] at the time of diagnosis in August 2013.  Radical prostatectomy done March 10, 2014 showing extracapsular extension, seminal vesicle invasion, no lymph node or lymphovascular invasion, and positive surgical margins.  Biochemical recurrence of disease noted September 2014 with negative radiographic findings--> was  restarted on Lupron along with Casodex at that time.  Lupron last given September 2014.  Was discontinued in March 2015 with Casodex continued on.    Casodex started September 2014 discontinued July 2015 secondary to breast tenderness  Casodex 50 mg po daily 11/22/16--discontinued after 3 doses secondary to severe shortness of breath  Palliative radiation to the right shoulder ×10 treatments completed from 08/21/2017 through 09/01/2017.  SABR to the pelvic lymph node from 06/22/2023 through 07/03/2023.    HPI:   Patient with a history of locally advanced prostate cancer who underwent definitive surgical resection followed by adjuvant radiation.  He then had a short course of Lupron for biochemical recurrence in September of 2014. In March of 2015, he was having increasing side effects from Lupron, switched to Casodex alone.  Scans in November of 2016 showed no definitive evidence of disease.  Due to rising PSA, he underwent scans in February 2017, the showed stable subcentimeter liver lesions and stable punctate sclerotic lesions in the bony pelvis.  Due to shoulder pain, an MRI of the right shoulder was done on 03/20 4th 2017 that showed altered bone marrow signal involving the glenoid and inferior scapular spine with mild bone marrow edema, review by Dr. Carpenter was thought that this was a metastatic bone lesion.  He was started on Xtandi in April of 2017.  He then received 10 fractions of radiation from 08/21/2017 through 09/01/2017 to the right shoulder.  He has been undergoing routine surveillance including PSAs and scans.  He had an increase in his PSA and a CT scan along with bone scan on 03/11/2022 that showed no evidence of disease.  PSMA scan was done on 7 6th 22, this revealed sclerotic changes in the right scapula with moderate uptake representing known metastatic disease and no other suspicious uptake.  PSMA PET/CT scan done on 06/02/2023 revealed suspicious 5 mm hypermetabolic right external iliac lymph  node and a right scapular osseous metastatic lesion.  Patient underwent SABR, completed on 07/03/2023.  PSMA PET/CT scan done on 10/06/2023 revealed slight decreased SUV uptake within a right scapular osseous metastasis and a subcentimeter right iliac chain lymph node with no new foci of disease present.    Most recent PSMA PET/CT scan done on 10/18/2024 due to slowly increasing PSA showed mixed response with right iliac chain node not well visualized with a decrease in radio tracer accumulation however the right scapular lesion has increased in size.    Interval History:   Patient in clinic today for scheduled follow up for metastatic prostate cancer.  Overall, he reports compliance with Xtandi and continues to tolerate treatment.  He feels good overall though he does report some issues with his pancreas lately.  He has been trying to avoid greasy fatty foods.  He continues with migraines but they have not changed in frequency or intensity.      Past Medical History:   Diagnosis Date    Bone metastases     Dysphagia     Elevated PSA     Hiatal hernia     Hypertension     no meds    Inflammatory bowel disease     diverticulitis    Prostate cancer     Restless leg       Past Surgical History:   Procedure Laterality Date    APPENDECTOMY      CARPAL TUNNEL RELEASE  11/2021    COLONOSCOPY      CYSTOSCOPY      HAND SURGERY Left     laparoscopic hiatal repair with mesh and Toupet fundoplication      open prostatectomy      PENILE PROSTHESIS IMPLANT      PROSTATE SURGERY      positive bx    SHOULDER SURGERY Right 04/27/2021    SKIN CANCER EXCISION      WRIST SURGERY      plate     Social History     Socioeconomic History    Marital status:    Tobacco Use    Smoking status: Former     Types: Cigarettes    Smokeless tobacco: Never   Substance and Sexual Activity    Alcohol use: No     Comment: social    Drug use: No    Sexual activity: Yes     Partners: Female     Social Drivers of Health     Financial Resource  Strain: Unknown (9/4/2022)    Received from [facility]    Overall Financial Resource Strain (CARDIA)     Difficulty of Paying Living Expenses: Patient declined   Food Insecurity: Unknown (9/4/2022)    Received from [facility]    Hunger Vital Sign     Worried About Running Out of Food in the Last Year: Patient declined     Ran Out of Food in the Last Year: Patient declined   Transportation Needs: Unknown (9/4/2022)    Received from [facility]    PRAPARE - Transportation     Lack of Transportation (Medical): Patient declined     Lack of Transportation (Non-Medical): Patient declined   Physical Activity: Inactive (9/4/2022)    Received from [facility]    Exercise Vital Sign     Days of Exercise per Week: 0 days     Minutes of Exercise per Session: 0 min   Stress: Stress Concern Present (9/4/2022)    Received from [facility] [place]    [employer] [place] [employer] - Occupational Stress Questionnaire     Feeling of Stress : To some extent   Housing Stability: Unknown (9/4/2022)    Received from [facility]    Housing Stability Vital Sign     Unable to Pay for Housing in the Last Year: Patient refused     Number of Places Lived in the Last Year: 1     Unstable Housing in the Last Year: No      Family History   Problem Relation Name Age of Onset    Cancer Paternal Grandfather skin cancer     Cancer Cousin pancreatic     Cancer Other pguncle/skin ca       Review of patient's allergies indicates:   Allergen Reactions    Clindamycin Anaphylaxis     Other reaction(s): Other (See Comments)  Pt states not an allergy, just cannot take this when taking his chemo medication      Ropinirole Other (See Comments)     Loss of consciousness  Other reaction(s): Faint, Hypotension  Other reaction(s): Other (See Comments)  Hypotension    Tetanus and diphther. tox (pf) Other (See Comments) and Anaphylaxis     hypotension    Tetanus vaccines and toxoid      Other reaction(s): Other (See Comments)  Hypotension    Tetanus toxoid        Review of Systems   Constitutional:  Negative for activity change, appetite change, fatigue, fever and unexpected weight change.   HENT:  Negative for mouth sores.    Eyes:  Negative for visual disturbance.   Respiratory:  Negative for cough and shortness of breath.    Cardiovascular:  Negative for chest pain and leg swelling.   Gastrointestinal:  Negative for abdominal distention, abdominal pain, anal bleeding, blood in stool and diarrhea.   Genitourinary:  Negative for frequency.   Musculoskeletal:  Negative for back pain.   Integumentary:  Negative for rash.   Allergic/Immunologic: Negative for frequent infections.   Neurological:  Negative for dizziness, weakness, light-headedness and headaches.   Hematological:  Negative for adenopathy.   Psychiatric/Behavioral:  The patient is not nervous/anxious.          Objective:      Physical Exam  Vitals reviewed.   Constitutional:       General: He is awake.      Appearance: Normal appearance.   HENT:      Head: Normocephalic and atraumatic.      Right Ear: Hearing normal.      Left Ear: Hearing normal.      Nose: Nose normal.   Eyes:      General: Lids are normal. Vision grossly intact.      Extraocular Movements: Extraocular movements intact.      Conjunctiva/sclera: Conjunctivae normal.   Cardiovascular:      Rate and Rhythm: Normal rate and regular rhythm.      Pulses: Normal pulses.      Heart sounds: Normal heart sounds.   Pulmonary:      Effort: Pulmonary effort is normal.      Breath sounds: Normal breath sounds. No wheezing, rhonchi or rales.   Musculoskeletal:      Cervical back: Full passive range of motion without pain.      Right lower leg: No edema.      Left lower leg: No edema.      Comments: Left hand with amputations of his 1st 4 fingers up to the knuckles due to trauma, only thumb remaining   Skin:     General: Skin is warm.   Neurological:      General: No focal deficit present.      Mental Status: He is alert and oriented to person, place, and  time.   Psychiatric:         Attention and Perception: Attention normal.         Mood and Affect: Mood and affect normal.         Behavior: Behavior is cooperative.         LABS REVIEWED IN [facility]          Assessment:     Hormone refractory prostate cancer-- c/w biochemical recurrence in Sept 2014, with subsequent suspicious bone mets in right shoulder in March 2017.    MRI findings were equivocal but patient had increased pain and was seen by ortho, with high degree of suspicion for mets.  Underwent palliative radiation.   h/o cT3b [place] 8 Adenocarcinoma of the prostate dx in 8/13 status post biopsy; Baseline PSA of 56.3-- put on [facility] Clinical trial with androgen deprivation preoperatively followed by radical prostatectomy done March 10, 2014, which showed extracapsular extension, seminal vesicle invasion, no lymph node invasion, no lymphovascular invasion, but with positive surgical margins.  He then completed external beam radiation therapy in August 2014.  Gynecomastia with tender bilateral breast tissue in the subareolar/periareolar region noted by the patient in early June 2015--> intermittent.   'Benign' osteosclerotic lesions read by radiology of CT from 11/11/16 at the left acetabulum, right proximal femur, and right ilium near the SI joint.    Stable on f/u CT/bone scans 2/24/17  DEXA on 3/22/19 noted osteopenia of both left/right femoral neck       Plan:       PSMA PET/CT scan done on 10/06/2023 shows improving disease with no evidence of new disease.    Plan to continue Xtandi 160 mg p.o. daily for now    Continue Lupron every 3 months    Continue Prolia every 6 months; DEXA done 02/20/2023 showing osteopenia.    Most recent PSMA PET/CT scan done on 10/18/2024 due to slowly increasing PSA showed increase activity in the right scapula.    Status post radiation to the right scapula.  We will request records from [facility]    We will get lab only in 6 weeks to check PSA      RTC in 3 months with  labs prior: CBC, CMP, PSA.      All questions were answered to the best of my ability and the patient understands the plan moving forward.      Vaishali Waite, FNP-C    Visit today included increased complexity associated with the care of the episodic problem Prostate cancer, addressing and managing the longitudinal care of the patient's prostate cancer.

## 2025-08-15 ENCOUNTER — INFUSION (OUTPATIENT)
Dept: INFUSION THERAPY | Facility: HOSPITAL | Age: 65
End: 2025-08-15
Attending: FAMILY MEDICINE
Payer: COMMERCIAL

## 2025-08-15 VITALS
WEIGHT: 242.5 LBS | HEART RATE: 71 BPM | BODY MASS INDEX: 37.98 KG/M2 | OXYGEN SATURATION: 98 % | TEMPERATURE: 97 F | DIASTOLIC BLOOD PRESSURE: 89 MMHG | SYSTOLIC BLOOD PRESSURE: 149 MMHG

## 2025-08-15 DIAGNOSIS — C61 PROSTATE CANCER: Primary | ICD-10-CM

## 2025-08-15 PROCEDURE — 96402 CHEMO HORMON ANTINEOPL SQ/IM: CPT

## 2025-08-15 PROCEDURE — 63600175 PHARM REV CODE 636 W HCPCS: Mod: JZ,TB | Performed by: NURSE PRACTITIONER

## 2025-08-15 RX ADMIN — LEUPROLIDE ACETATE 22.5 MG: KIT at 11:08

## 2025-09-02 ENCOUNTER — LAB VISIT (OUTPATIENT)
Dept: LAB | Facility: HOSPITAL | Age: 65
End: 2025-09-02
Attending: NURSE PRACTITIONER
Payer: COMMERCIAL

## 2025-09-02 DIAGNOSIS — Z79.818 ENCOUNTER FOR MONITORING ANDROGEN DEPRIVATION THERAPY: ICD-10-CM

## 2025-09-02 DIAGNOSIS — C79.51 MALIGNANT NEOPLASM METASTATIC TO BONE: ICD-10-CM

## 2025-09-02 DIAGNOSIS — C61 MALIGNANT NEOPLASM OF PROSTATE: ICD-10-CM

## 2025-09-02 LAB
ALBUMIN SERPL-MCNC: 3.4 G/DL (ref 3.4–4.8)
ALBUMIN/GLOB SERPL: 0.9 RATIO (ref 1.1–2)
ALP SERPL-CCNC: 86 UNIT/L (ref 40–150)
ALT SERPL-CCNC: 15 UNIT/L (ref 0–55)
ANION GAP SERPL CALC-SCNC: 7 MEQ/L
AST SERPL-CCNC: 16 UNIT/L (ref 11–45)
BASOPHILS # BLD AUTO: 0.04 X10(3)/MCL
BASOPHILS NFR BLD AUTO: 0.7 %
BILIRUB SERPL-MCNC: 0.2 MG/DL
BUN SERPL-MCNC: 21.3 MG/DL (ref 8.4–25.7)
CALCIUM SERPL-MCNC: 8.6 MG/DL (ref 8.8–10)
CHLORIDE SERPL-SCNC: 104 MMOL/L (ref 98–107)
CO2 SERPL-SCNC: 29 MMOL/L (ref 23–31)
CREAT SERPL-MCNC: 0.99 MG/DL (ref 0.72–1.25)
CREAT/UREA NIT SERPL: 22
EOSINOPHIL # BLD AUTO: 0.17 X10(3)/MCL (ref 0–0.9)
EOSINOPHIL NFR BLD AUTO: 3 %
ERYTHROCYTE [DISTWIDTH] IN BLOOD BY AUTOMATED COUNT: 11.8 % (ref 11.5–17)
GFR SERPLBLD CREATININE-BSD FMLA CKD-EPI: >60 ML/MIN/1.73/M2
GLOBULIN SER-MCNC: 3.8 GM/DL (ref 2.4–3.5)
GLUCOSE SERPL-MCNC: 113 MG/DL (ref 82–115)
HCT VFR BLD AUTO: 43.1 % (ref 42–52)
HGB BLD-MCNC: 14.1 G/DL (ref 14–18)
IMM GRANULOCYTES # BLD AUTO: 0.01 X10(3)/MCL (ref 0–0.04)
IMM GRANULOCYTES NFR BLD AUTO: 0.2 %
LYMPHOCYTES # BLD AUTO: 1.11 X10(3)/MCL (ref 0.6–4.6)
LYMPHOCYTES NFR BLD AUTO: 19.9 %
MCH RBC QN AUTO: 29.4 PG (ref 27–31)
MCHC RBC AUTO-ENTMCNC: 32.7 G/DL (ref 33–36)
MCV RBC AUTO: 90 FL (ref 80–94)
MONOCYTES # BLD AUTO: 0.53 X10(3)/MCL (ref 0.1–1.3)
MONOCYTES NFR BLD AUTO: 9.5 %
NEUTROPHILS # BLD AUTO: 3.72 X10(3)/MCL (ref 2.1–9.2)
NEUTROPHILS NFR BLD AUTO: 66.7 %
PLATELET # BLD AUTO: 209 X10(3)/MCL (ref 130–400)
PMV BLD AUTO: 8.5 FL (ref 7.4–10.4)
POTASSIUM SERPL-SCNC: 4.5 MMOL/L (ref 3.5–5.1)
PROT SERPL-MCNC: 7.2 GM/DL (ref 5.8–7.6)
PSA SERPL-MCNC: 2.3 NG/ML
RBC # BLD AUTO: 4.79 X10(6)/MCL (ref 4.7–6.1)
SODIUM SERPL-SCNC: 140 MMOL/L (ref 136–145)
WBC # BLD AUTO: 5.58 X10(3)/MCL (ref 4.5–11.5)

## 2025-09-02 PROCEDURE — 84153 ASSAY OF PSA TOTAL: CPT

## 2025-09-02 PROCEDURE — 85025 COMPLETE CBC W/AUTO DIFF WBC: CPT

## 2025-09-02 PROCEDURE — 36415 COLL VENOUS BLD VENIPUNCTURE: CPT

## 2025-09-02 PROCEDURE — 80053 COMPREHEN METABOLIC PANEL: CPT

## 2025-09-03 DIAGNOSIS — C61 MALIGNANT NEOPLASM OF PROSTATE: Primary | ICD-10-CM

## 2025-09-03 DIAGNOSIS — C79.51 MALIGNANT NEOPLASM METASTATIC TO BONE: ICD-10-CM
